# Patient Record
Sex: MALE | Race: WHITE | NOT HISPANIC OR LATINO | URBAN - METROPOLITAN AREA
[De-identification: names, ages, dates, MRNs, and addresses within clinical notes are randomized per-mention and may not be internally consistent; named-entity substitution may affect disease eponyms.]

---

## 2017-02-23 ENCOUNTER — APPOINTMENT (RX ONLY)
Dept: URBAN - METROPOLITAN AREA CLINIC 24 | Facility: CLINIC | Age: 82
Setting detail: DERMATOLOGY
End: 2017-02-23

## 2017-02-23 DIAGNOSIS — Z85.828 PERSONAL HISTORY OF OTHER MALIGNANT NEOPLASM OF SKIN: ICD-10-CM

## 2017-02-23 DIAGNOSIS — L81.4 OTHER MELANIN HYPERPIGMENTATION: ICD-10-CM

## 2017-02-23 DIAGNOSIS — L82.1 OTHER SEBORRHEIC KERATOSIS: ICD-10-CM

## 2017-02-23 PROBLEM — M12.9 ARTHROPATHY, UNSPECIFIED: Status: ACTIVE | Noted: 2017-02-23

## 2017-02-23 PROBLEM — L85.3 XEROSIS CUTIS: Status: ACTIVE | Noted: 2017-02-23

## 2017-02-23 PROBLEM — J30.1 ALLERGIC RHINITIS DUE TO POLLEN: Status: ACTIVE | Noted: 2017-02-23

## 2017-02-23 PROCEDURE — ? COUNSELING

## 2017-02-23 PROCEDURE — 99213 OFFICE O/P EST LOW 20 MIN: CPT

## 2017-02-23 ASSESSMENT — LOCATION SIMPLE DESCRIPTION DERM
LOCATION SIMPLE: NOSE
LOCATION SIMPLE: RIGHT UPPER ARM
LOCATION SIMPLE: LEFT SHOULDER
LOCATION SIMPLE: LEFT FOREARM
LOCATION SIMPLE: RIGHT FOREHEAD
LOCATION SIMPLE: UPPER BACK
LOCATION SIMPLE: CHEST
LOCATION SIMPLE: RIGHT SHOULDER

## 2017-02-23 ASSESSMENT — LOCATION ZONE DERM
LOCATION ZONE: NOSE
LOCATION ZONE: TRUNK
LOCATION ZONE: ARM
LOCATION ZONE: FACE

## 2017-02-23 ASSESSMENT — LOCATION DETAILED DESCRIPTION DERM
LOCATION DETAILED: LEFT POSTERIOR SHOULDER
LOCATION DETAILED: RIGHT MEDIAL FOREHEAD
LOCATION DETAILED: RIGHT MEDIAL INFERIOR CHEST
LOCATION DETAILED: RIGHT PROXIMAL POSTERIOR UPPER ARM
LOCATION DETAILED: INFERIOR THORACIC SPINE
LOCATION DETAILED: RIGHT POSTERIOR SHOULDER
LOCATION DETAILED: LEFT PROXIMAL DORSAL FOREARM
LOCATION DETAILED: NASAL DORSUM

## 2017-06-16 ENCOUNTER — HOSPITAL ENCOUNTER (OUTPATIENT)
Dept: CT IMAGING | Age: 82
Discharge: HOME | End: 2017-06-16
Attending: FAMILY MEDICINE
Payer: MEDICARE

## 2017-06-16 VITALS — HEIGHT: 68 IN | BODY MASS INDEX: 32.58 KG/M2 | WEIGHT: 215 LBS

## 2017-06-16 DIAGNOSIS — K59.00 CONSTIPATION, UNSPECIFIED CONSTIPATION TYPE: ICD-10-CM

## 2017-06-16 DIAGNOSIS — R14.0 ABDOMINAL BLOATING: ICD-10-CM

## 2017-06-16 PROCEDURE — 74011000258 HC RX REV CODE- 258: Performed by: FAMILY MEDICINE

## 2017-06-16 PROCEDURE — 74011636320 HC RX REV CODE- 636/320: Performed by: FAMILY MEDICINE

## 2017-06-16 PROCEDURE — 74177 CT ABD & PELVIS W/CONTRAST: CPT

## 2017-06-16 RX ORDER — SODIUM CHLORIDE 0.9 % (FLUSH) 0.9 %
10 SYRINGE (ML) INJECTION
Status: COMPLETED | OUTPATIENT
Start: 2017-06-16 | End: 2017-06-16

## 2017-06-16 RX ADMIN — Medication 10 ML: at 09:51

## 2017-06-16 RX ADMIN — SODIUM CHLORIDE 100 ML: 900 INJECTION, SOLUTION INTRAVENOUS at 09:51

## 2017-06-16 RX ADMIN — DIATRIZOATE MEGLUMINE AND DIATRIZOATE SODIUM 15 ML: 660; 100 LIQUID ORAL; RECTAL at 09:51

## 2017-06-16 RX ADMIN — IOPAMIDOL 100 ML: 755 INJECTION, SOLUTION INTRAVENOUS at 09:50

## 2017-07-26 ENCOUNTER — HOSPITAL ENCOUNTER (OUTPATIENT)
Dept: WOUND CARE | Age: 82
Discharge: HOME | End: 2017-07-26
Attending: PODIATRIST
Payer: MEDICARE

## 2017-07-26 PROCEDURE — 87075 CULTR BACTERIA EXCEPT BLOOD: CPT | Performed by: PODIATRIST

## 2017-07-26 PROCEDURE — 87186 SC STD MICRODIL/AGAR DIL: CPT | Performed by: PODIATRIST

## 2017-07-26 PROCEDURE — 87077 CULTURE AEROBIC IDENTIFY: CPT | Performed by: PODIATRIST

## 2017-07-26 PROCEDURE — 87205 SMEAR GRAM STAIN: CPT | Performed by: PODIATRIST

## 2017-07-26 PROCEDURE — 99214 OFFICE O/P EST MOD 30 MIN: CPT

## 2017-07-28 ENCOUNTER — HOSPITAL ENCOUNTER (OUTPATIENT)
Dept: GENERAL RADIOLOGY | Age: 82
Discharge: HOME | End: 2017-07-28
Payer: MEDICARE

## 2017-07-28 DIAGNOSIS — M1A.4721: ICD-10-CM

## 2017-07-28 PROCEDURE — 73630 X-RAY EXAM OF FOOT: CPT

## 2017-07-30 LAB
BACTERIA SPEC CULT: ABNORMAL
BACTERIA SPEC CULT: ABNORMAL
GRAM STN SPEC: ABNORMAL
GRAM STN SPEC: ABNORMAL
SERVICE CMNT-IMP: ABNORMAL

## 2017-07-31 LAB
BACTERIA SPEC ANAEROBE CULT: NORMAL
SPECIMEN SOURCE: NORMAL

## 2017-08-04 ENCOUNTER — HOSPITAL ENCOUNTER (OUTPATIENT)
Dept: WOUND CARE | Age: 82
Discharge: HOME | End: 2017-08-04
Attending: PODIATRIST
Payer: MEDICARE

## 2017-08-04 PROCEDURE — 99213 OFFICE O/P EST LOW 20 MIN: CPT

## 2017-08-11 PROCEDURE — 77030030708 HC OINT IODOSRB S&N -A

## 2017-08-11 PROCEDURE — 99213 OFFICE O/P EST LOW 20 MIN: CPT

## 2017-08-11 PROCEDURE — 77030011256 HC DRSG MEPILEX <16IN NO BORD MOLN -A

## 2017-08-16 LAB
BACTERIA SPEC CULT: ABNORMAL
BACTERIA SPEC CULT: ABNORMAL
SOURCE, RSRC56: ABNORMAL

## 2017-08-18 PROCEDURE — 99213 OFFICE O/P EST LOW 20 MIN: CPT

## 2017-09-18 ENCOUNTER — HOSPITAL ENCOUNTER (OUTPATIENT)
Dept: GENERAL RADIOLOGY | Age: 82
Discharge: HOME | End: 2017-09-18
Payer: MEDICARE

## 2017-09-18 DIAGNOSIS — I70.245 ATHEROSCLEROSIS OF NATIVE ARTERY OF LEFT LOWER EXTREMITY WITH ULCERATION OF OTHER PART OF FOOT (HCC): ICD-10-CM

## 2017-09-18 PROCEDURE — 73630 X-RAY EXAM OF FOOT: CPT

## 2017-09-29 PROBLEM — L97.509 FOOT ULCER (HCC): Status: ACTIVE | Noted: 2017-09-29

## 2017-10-02 ENCOUNTER — HOSPITAL ENCOUNTER (OUTPATIENT)
Dept: CT IMAGING | Age: 82
Discharge: HOME | End: 2017-10-02
Attending: NURSE PRACTITIONER
Payer: MEDICARE

## 2017-10-02 VITALS — WEIGHT: 207 LBS | BODY MASS INDEX: 31.37 KG/M2 | HEIGHT: 68 IN

## 2017-10-02 DIAGNOSIS — L97.511 FOOT ULCER, RIGHT, LIMITED TO BREAKDOWN OF SKIN (HCC): ICD-10-CM

## 2017-10-02 DIAGNOSIS — M10.00 IDIOPATHIC GOUT, UNSPECIFIED CHRONICITY, UNSPECIFIED SITE: ICD-10-CM

## 2017-10-02 DIAGNOSIS — I70.213 ATHEROSCLEROSIS OF NATIVE ARTERY OF BOTH LOWER EXTREMITIES WITH INTERMITTENT CLAUDICATION (HCC): Chronic | ICD-10-CM

## 2017-10-02 LAB — CREAT BLD-MCNC: 1.6 MG/DL (ref 0.8–1.5)

## 2017-10-02 PROCEDURE — 75635 CT ANGIO ABDOMINAL ARTERIES: CPT

## 2017-10-02 PROCEDURE — 74011636320 HC RX REV CODE- 636/320: Performed by: NURSE PRACTITIONER

## 2017-10-02 PROCEDURE — 74011250636 HC RX REV CODE- 250/636: Performed by: NURSE PRACTITIONER

## 2017-10-02 PROCEDURE — 74011000258 HC RX REV CODE- 258: Performed by: NURSE PRACTITIONER

## 2017-10-02 PROCEDURE — 82565 ASSAY OF CREATININE: CPT

## 2017-10-02 RX ORDER — SODIUM CHLORIDE 0.9 % (FLUSH) 0.9 %
10 SYRINGE (ML) INJECTION
Status: COMPLETED | OUTPATIENT
Start: 2017-10-02 | End: 2017-10-02

## 2017-10-02 RX ORDER — SODIUM CHLORIDE 9 MG/ML
91 INJECTION, SOLUTION INTRAVENOUS CONTINUOUS
Status: ACTIVE | OUTPATIENT
Start: 2017-10-02 | End: 2017-10-02

## 2017-10-02 RX ADMIN — SODIUM CHLORIDE 100 ML: 900 INJECTION, SOLUTION INTRAVENOUS at 12:51

## 2017-10-02 RX ADMIN — SODIUM CHLORIDE 273 ML: 900 INJECTION, SOLUTION INTRAVENOUS at 09:57

## 2017-10-02 RX ADMIN — IOPAMIDOL 125 ML: 755 INJECTION, SOLUTION INTRAVENOUS at 12:51

## 2017-10-02 RX ADMIN — Medication 10 ML: at 12:51

## 2017-10-26 ENCOUNTER — APPOINTMENT (RX ONLY)
Dept: URBAN - METROPOLITAN AREA CLINIC 23 | Facility: CLINIC | Age: 82
Setting detail: DERMATOLOGY
End: 2017-10-26

## 2017-10-26 DIAGNOSIS — L81.4 OTHER MELANIN HYPERPIGMENTATION: ICD-10-CM

## 2017-10-26 DIAGNOSIS — Z85.828 PERSONAL HISTORY OF OTHER MALIGNANT NEOPLASM OF SKIN: ICD-10-CM

## 2017-10-26 DIAGNOSIS — L82.1 OTHER SEBORRHEIC KERATOSIS: ICD-10-CM

## 2017-10-26 DIAGNOSIS — Z71.89 OTHER SPECIFIED COUNSELING: ICD-10-CM

## 2017-10-26 PROBLEM — M12.9 ARTHROPATHY, UNSPECIFIED: Status: ACTIVE | Noted: 2017-10-26

## 2017-10-26 PROBLEM — L85.3 XEROSIS CUTIS: Status: ACTIVE | Noted: 2017-10-26

## 2017-10-26 PROCEDURE — ? COUNSELING

## 2017-10-26 PROCEDURE — 99214 OFFICE O/P EST MOD 30 MIN: CPT

## 2017-10-26 ASSESSMENT — LOCATION SIMPLE DESCRIPTION DERM
LOCATION SIMPLE: RIGHT UPPER ARM
LOCATION SIMPLE: NOSE
LOCATION SIMPLE: RIGHT FOREHEAD
LOCATION SIMPLE: RIGHT UPPER BACK
LOCATION SIMPLE: LEFT UPPER BACK
LOCATION SIMPLE: UPPER BACK
LOCATION SIMPLE: LEFT FOREARM
LOCATION SIMPLE: RIGHT SHOULDER
LOCATION SIMPLE: CHEST
LOCATION SIMPLE: RIGHT FOREARM
LOCATION SIMPLE: LEFT SHOULDER

## 2017-10-26 ASSESSMENT — LOCATION DETAILED DESCRIPTION DERM
LOCATION DETAILED: RIGHT MEDIAL INFERIOR CHEST
LOCATION DETAILED: LEFT SUPERIOR MEDIAL UPPER BACK
LOCATION DETAILED: RIGHT MEDIAL FOREHEAD
LOCATION DETAILED: LEFT POSTERIOR SHOULDER
LOCATION DETAILED: RIGHT DISTAL DORSAL FOREARM
LOCATION DETAILED: RIGHT PROXIMAL POSTERIOR UPPER ARM
LOCATION DETAILED: RIGHT INFERIOR MEDIAL UPPER BACK
LOCATION DETAILED: LEFT PROXIMAL DORSAL FOREARM
LOCATION DETAILED: INFERIOR THORACIC SPINE
LOCATION DETAILED: NASAL DORSUM
LOCATION DETAILED: RIGHT POSTERIOR SHOULDER

## 2017-10-26 ASSESSMENT — LOCATION ZONE DERM
LOCATION ZONE: TRUNK
LOCATION ZONE: ARM
LOCATION ZONE: NOSE
LOCATION ZONE: FACE

## 2018-05-18 PROBLEM — I73.9 PAD (PERIPHERAL ARTERY DISEASE) (HCC): Status: ACTIVE | Noted: 2018-05-18

## 2018-05-18 PROBLEM — M1A.9XX1 CHRONIC TOPHACEOUS GOUT: Status: ACTIVE | Noted: 2018-05-18

## 2018-07-26 ENCOUNTER — HOSPITAL ENCOUNTER (OUTPATIENT)
Dept: LAB | Age: 83
Discharge: HOME | End: 2018-07-26

## 2018-07-26 PROCEDURE — 88305 TISSUE EXAM BY PATHOLOGIST: CPT | Performed by: INTERNAL MEDICINE

## 2018-08-21 ENCOUNTER — APPOINTMENT (RX ONLY)
Dept: URBAN - METROPOLITAN AREA CLINIC 23 | Facility: CLINIC | Age: 83
Setting detail: DERMATOLOGY
End: 2018-08-21

## 2018-08-21 DIAGNOSIS — L82.1 OTHER SEBORRHEIC KERATOSIS: ICD-10-CM

## 2018-08-21 DIAGNOSIS — D485 NEOPLASM OF UNCERTAIN BEHAVIOR OF SKIN: ICD-10-CM

## 2018-08-21 DIAGNOSIS — Z85.828 PERSONAL HISTORY OF OTHER MALIGNANT NEOPLASM OF SKIN: ICD-10-CM

## 2018-08-21 DIAGNOSIS — L81.4 OTHER MELANIN HYPERPIGMENTATION: ICD-10-CM

## 2018-08-21 DIAGNOSIS — L70.0 ACNE VULGARIS: ICD-10-CM

## 2018-08-21 DIAGNOSIS — L82.0 INFLAMED SEBORRHEIC KERATOSIS: ICD-10-CM

## 2018-08-21 DIAGNOSIS — Z71.89 OTHER SPECIFIED COUNSELING: ICD-10-CM

## 2018-08-21 PROBLEM — J30.1 ALLERGIC RHINITIS DUE TO POLLEN: Status: ACTIVE | Noted: 2018-08-21

## 2018-08-21 PROBLEM — I10 ESSENTIAL (PRIMARY) HYPERTENSION: Status: ACTIVE | Noted: 2018-08-21

## 2018-08-21 PROBLEM — D48.5 NEOPLASM OF UNCERTAIN BEHAVIOR OF SKIN: Status: ACTIVE | Noted: 2018-08-21

## 2018-08-21 PROBLEM — I25.10 ATHEROSCLEROTIC HEART DISEASE OF NATIVE CORONARY ARTERY WITHOUT ANGINA PECTORIS: Status: ACTIVE | Noted: 2018-08-21

## 2018-08-21 PROBLEM — L85.3 XEROSIS CUTIS: Status: ACTIVE | Noted: 2018-08-21

## 2018-08-21 PROBLEM — M12.9 ARTHROPATHY, UNSPECIFIED: Status: ACTIVE | Noted: 2018-08-21

## 2018-08-21 PROCEDURE — ? COUNSELING

## 2018-08-21 PROCEDURE — ? SHAVE REMOVAL

## 2018-08-21 PROCEDURE — 17110 DESTRUCTION B9 LES UP TO 14: CPT

## 2018-08-21 PROCEDURE — ? LIQUID NITROGEN

## 2018-08-21 PROCEDURE — 11100: CPT | Mod: 59

## 2018-08-21 PROCEDURE — 99213 OFFICE O/P EST LOW 20 MIN: CPT | Mod: 25

## 2018-08-21 PROCEDURE — ? BIOPSY BY SHAVE METHOD

## 2018-08-21 PROCEDURE — 11301 SHAVE SKIN LESION 0.6-1.0 CM: CPT | Mod: 59

## 2018-08-21 ASSESSMENT — LOCATION SIMPLE DESCRIPTION DERM
LOCATION SIMPLE: UPPER BACK
LOCATION SIMPLE: RIGHT UPPER ARM
LOCATION SIMPLE: LEFT SHOULDER
LOCATION SIMPLE: CHEST
LOCATION SIMPLE: LEFT FOREARM
LOCATION SIMPLE: LEFT TEMPLE
LOCATION SIMPLE: NOSE
LOCATION SIMPLE: LOWER BACK
LOCATION SIMPLE: RIGHT FOREARM
LOCATION SIMPLE: RIGHT ANTERIOR NECK
LOCATION SIMPLE: RIGHT SHOULDER
LOCATION SIMPLE: RIGHT FOREHEAD
LOCATION SIMPLE: POSTERIOR NECK
LOCATION SIMPLE: RIGHT UPPER BACK
LOCATION SIMPLE: RIGHT LOWER BACK

## 2018-08-21 ASSESSMENT — LOCATION DETAILED DESCRIPTION DERM
LOCATION DETAILED: RIGHT POSTERIOR SHOULDER
LOCATION DETAILED: RIGHT MEDIAL UPPER BACK
LOCATION DETAILED: LEFT CENTRAL TEMPLE
LOCATION DETAILED: LEFT POSTERIOR SHOULDER
LOCATION DETAILED: RIGHT CLAVICULAR NECK
LOCATION DETAILED: RIGHT PROXIMAL POSTERIOR UPPER ARM
LOCATION DETAILED: RIGHT DISTAL DORSAL FOREARM
LOCATION DETAILED: RIGHT INFERIOR MEDIAL MIDBACK
LOCATION DETAILED: RIGHT SUPERIOR LATERAL MIDBACK
LOCATION DETAILED: RIGHT MEDIAL INFERIOR CHEST
LOCATION DETAILED: LEFT PROXIMAL DORSAL FOREARM
LOCATION DETAILED: NASAL DORSUM
LOCATION DETAILED: SUPERIOR LUMBAR SPINE
LOCATION DETAILED: RIGHT MID-UPPER BACK
LOCATION DETAILED: RIGHT MEDIAL FOREHEAD
LOCATION DETAILED: SUPERIOR THORACIC SPINE
LOCATION DETAILED: LEFT MEDIAL TRAPEZIAL NECK
LOCATION DETAILED: INFERIOR THORACIC SPINE

## 2018-08-21 ASSESSMENT — LOCATION ZONE DERM
LOCATION ZONE: FACE
LOCATION ZONE: TRUNK
LOCATION ZONE: NOSE
LOCATION ZONE: ARM
LOCATION ZONE: NECK
LOCATION ZONE: FACE

## 2018-08-21 NOTE — PROCEDURE: LIQUID NITROGEN
Post-Care Instructions: I reviewed with the patient in detail post-care instructions. Patient is to wear sunprotection, and avoid picking at any of the treated lesions. Pt may apply Vaseline to crusted or scabbing areas.
Pared With?: 15 blade
Consent: The patient's consent was obtained including but not limited to risks of crusting, scabbing, blistering, scarring, darker or lighter pigmentary change, recurrence, incomplete removal and infection.
Include Z78.9 (Other Specified Conditions Influencing Health Status) As An Associated Diagnosis?: No
Duration Of Freeze Thaw-Cycle (Seconds): 5-10
Medical Necessity Information: It is in your best interest to select a reason for this procedure from the list below. All of these items fulfill various CMS LCD requirements except the new and changing color options.
Detail Level: Detailed
Number Of Freeze-Thaw Cycles: 1 freeze-thaw cycle

## 2018-08-21 NOTE — PROCEDURE: BIOPSY BY SHAVE METHOD
Bill 51628 For Specimen Handling/Conveyance To Laboratory?: no
Dressing: bandage
Billing Type: Third-Party Bill
Biopsy Type: H and E
Type Of Destruction Used: Curettage
Electrodesiccation And Curettage Text: The wound bed was treated with electrodesiccation and curettage after the biopsy was performed.
Depth Of Biopsy: dermis
Anesthesia Volume In Cc: 0.5
Anesthesia Type: 1% lidocaine with epinephrine
Silver Nitrate Text: The wound bed was treated with silver nitrate after the biopsy was performed.
Biopsy Method: Dermablade
Detail Level: Detailed
Size Of Lesion In Cm: 0.7
Cryotherapy Text: The wound bed was treated with cryotherapy after the biopsy was performed.
Consent: Written consent was obtained and risks were reviewed including but not limited to scarring, infection, bleeding, scabbing, incomplete removal, nerve damage and allergy to anesthesia.
Post-Care Instructions: I reviewed with the patient in detail post-care instructions. Patient is to keep the biopsy site dry overnight, and then apply bacitracin twice daily until healed. Patient may apply hydrogen peroxide soaks to remove any crusting.
Was A Bandage Applied: Yes
Wound Care: Petrolatum
Path Notes (To The Dermatopathologist): Please check margins.
Electrodesiccation Text: The wound bed was treated with electrodesiccation after the biopsy was performed.
Hemostasis: Electrocautery
Notification Instructions: Patient will be notified of biopsy results. However, patient instructed to call the office if not contacted within 2 weeks.
X Size Of Lesion In Cm: 0

## 2018-08-21 NOTE — PROCEDURE: SHAVE REMOVAL
Billing Type: Third-Party Bill
Render Post-Care Instructions In Note?: no
Consent was obtained from the patient. The risks and benefits to therapy were discussed in detail. Specifically, the risks of infection, scarring, bleeding, prolonged wound healing, incomplete removal, allergy to anesthesia, nerve injury and recurrence were addressed. Prior to the procedure, the treatment site was clearly identified and confirmed by the patient.
Path Notes (To The Dermatopathologist): Please check margins.
Detail Level: Detailed
Was A Bandage Applied: Yes
Wound Care: Petrolatum
Anesthesia Volume In Cc: 1.5
Hemostasis: Electrocautery
Biopsy Method: Dermablade
Medical Necessity Information: It is in your best interest to select a reason for this procedure from the list below. All of these items fulfill various CMS LCD requirements except the new and changing color options.
Medical Necessity Clause: This procedure was medically necessary because the lesion that was treated was:
X Size Of Lesion In Cm (Optional): 0
Size Of Lesion In Cm (Required): 0.9
Post-Care Instructions: I reviewed with the patient in detail post-care instructions. Patient is to keep the biopsy site dry overnight, and then apply bacitracin twice daily until healed. Patient may apply hydrogen peroxide soaks to remove any crusting.
Anesthesia Type: 1% lidocaine with epinephrine
Notification Instructions: Patient will be notified of biopsy results. However, patient instructed to call the office if not contacted within 2 weeks.

## 2018-11-20 ENCOUNTER — APPOINTMENT (RX ONLY)
Dept: URBAN - METROPOLITAN AREA CLINIC 23 | Facility: CLINIC | Age: 83
Setting detail: DERMATOLOGY
End: 2018-11-20

## 2018-11-20 DIAGNOSIS — D18.0 HEMANGIOMA: ICD-10-CM

## 2018-11-20 DIAGNOSIS — Z71.89 OTHER SPECIFIED COUNSELING: ICD-10-CM

## 2018-11-20 DIAGNOSIS — Z85.828 PERSONAL HISTORY OF OTHER MALIGNANT NEOPLASM OF SKIN: ICD-10-CM

## 2018-11-20 DIAGNOSIS — L81.4 OTHER MELANIN HYPERPIGMENTATION: ICD-10-CM

## 2018-11-20 DIAGNOSIS — L57.0 ACTINIC KERATOSIS: ICD-10-CM

## 2018-11-20 PROBLEM — E78.5 HYPERLIPIDEMIA, UNSPECIFIED: Status: ACTIVE | Noted: 2018-11-20

## 2018-11-20 PROBLEM — I25.10 ATHEROSCLEROTIC HEART DISEASE OF NATIVE CORONARY ARTERY WITHOUT ANGINA PECTORIS: Status: ACTIVE | Noted: 2018-11-20

## 2018-11-20 PROBLEM — L85.3 XEROSIS CUTIS: Status: ACTIVE | Noted: 2018-11-20

## 2018-11-20 PROBLEM — D18.01 HEMANGIOMA OF SKIN AND SUBCUTANEOUS TISSUE: Status: ACTIVE | Noted: 2018-11-20

## 2018-11-20 PROCEDURE — 99213 OFFICE O/P EST LOW 20 MIN: CPT | Mod: 25

## 2018-11-20 PROCEDURE — 17003 DESTRUCT PREMALG LES 2-14: CPT

## 2018-11-20 PROCEDURE — ? LIQUID NITROGEN

## 2018-11-20 PROCEDURE — 17000 DESTRUCT PREMALG LESION: CPT

## 2018-11-20 PROCEDURE — ? COUNSELING

## 2018-11-20 ASSESSMENT — LOCATION SIMPLE DESCRIPTION DERM
LOCATION SIMPLE: RIGHT FOREHEAD
LOCATION SIMPLE: LEFT FOREARM
LOCATION SIMPLE: RIGHT UPPER ARM
LOCATION SIMPLE: RIGHT SHOULDER
LOCATION SIMPLE: ABDOMEN
LOCATION SIMPLE: LEFT TEMPLE
LOCATION SIMPLE: RIGHT CHEEK
LOCATION SIMPLE: CHEST
LOCATION SIMPLE: LEFT FOREHEAD
LOCATION SIMPLE: LEFT SHOULDER
LOCATION SIMPLE: NOSE
LOCATION SIMPLE: RIGHT UPPER BACK

## 2018-11-20 ASSESSMENT — LOCATION DETAILED DESCRIPTION DERM
LOCATION DETAILED: RIGHT INFERIOR MEDIAL MALAR CHEEK
LOCATION DETAILED: RIGHT CENTRAL MALAR CHEEK
LOCATION DETAILED: LEFT INFERIOR MEDIAL FOREHEAD
LOCATION DETAILED: LEFT CENTRAL TEMPLE
LOCATION DETAILED: RIGHT INFERIOR UPPER BACK
LOCATION DETAILED: NASAL DORSUM
LOCATION DETAILED: LEFT PROXIMAL DORSAL FOREARM
LOCATION DETAILED: RIGHT POSTERIOR SHOULDER
LOCATION DETAILED: LEFT LATERAL ABDOMEN
LOCATION DETAILED: LEFT POSTERIOR SHOULDER
LOCATION DETAILED: RIGHT MEDIAL INFERIOR CHEST
LOCATION DETAILED: RIGHT PROXIMAL POSTERIOR UPPER ARM
LOCATION DETAILED: RIGHT MEDIAL FOREHEAD

## 2018-11-20 ASSESSMENT — LOCATION ZONE DERM
LOCATION ZONE: FACE
LOCATION ZONE: TRUNK
LOCATION ZONE: ARM
LOCATION ZONE: FACE
LOCATION ZONE: NOSE

## 2018-11-20 NOTE — PROCEDURE: LIQUID NITROGEN
Number Of Freeze-Thaw Cycles: 1 freeze-thaw cycle
Duration Of Freeze Thaw-Cycle (Seconds): 5
Post-Care Instructions: I reviewed with the patient in detail post-care instructions. Patient is to wear sunprotection, and avoid picking at any of the treated lesions. Pt may apply Vaseline to crusted or scabbing areas.
Detail Level: Detailed
Consent: The patient's consent was obtained including but not limited to risks of crusting, scabbing, blistering, scarring, darker or lighter pigmentary change, recurrence, incomplete removal and infection.
Render Post-Care Instructions In Note?: no

## 2019-01-22 ENCOUNTER — APPOINTMENT (RX ONLY)
Dept: URBAN - METROPOLITAN AREA CLINIC 23 | Facility: CLINIC | Age: 84
Setting detail: DERMATOLOGY
End: 2019-01-22

## 2019-01-22 DIAGNOSIS — D485 NEOPLASM OF UNCERTAIN BEHAVIOR OF SKIN: ICD-10-CM

## 2019-01-22 PROBLEM — D48.5 NEOPLASM OF UNCERTAIN BEHAVIOR OF SKIN: Status: ACTIVE | Noted: 2019-01-22

## 2019-01-22 PROBLEM — I10 ESSENTIAL (PRIMARY) HYPERTENSION: Status: ACTIVE | Noted: 2019-01-22

## 2019-01-22 PROBLEM — J30.1 ALLERGIC RHINITIS DUE TO POLLEN: Status: ACTIVE | Noted: 2019-01-22

## 2019-01-22 PROBLEM — I25.10 ATHEROSCLEROTIC HEART DISEASE OF NATIVE CORONARY ARTERY WITHOUT ANGINA PECTORIS: Status: ACTIVE | Noted: 2019-01-22

## 2019-01-22 PROCEDURE — 11102 TANGNTL BX SKIN SINGLE LES: CPT

## 2019-01-22 PROCEDURE — ? BIOPSY BY SHAVE METHOD

## 2019-01-22 ASSESSMENT — LOCATION SIMPLE DESCRIPTION DERM: LOCATION SIMPLE: LEFT TEMPLE

## 2019-01-22 ASSESSMENT — LOCATION DETAILED DESCRIPTION DERM: LOCATION DETAILED: LEFT CENTRAL TEMPLE

## 2019-01-22 ASSESSMENT — LOCATION ZONE DERM: LOCATION ZONE: FACE

## 2019-01-22 NOTE — PROCEDURE: BIOPSY BY SHAVE METHOD
Detail Level: Detailed
Consent: Written consent was obtained and risks were reviewed including but not limited to scarring, infection, bleeding, scabbing, incomplete removal, nerve damage and allergy to anesthesia.
Path Notes (To The Dermatopathologist): Please check margins.
Biopsy Type: H and E
Post-Care Instructions: I reviewed with the patient in detail post-care instructions. Patient is to keep the biopsy site dry overnight, and then apply bacitracin twice daily until healed. Patient may apply hydrogen peroxide soaks to remove any crusting.
Dressing: bandage
Billing Type: Third-Party Bill
Anesthesia Type: 1% lidocaine with epinephrine
Anesthesia Volume In Cc: 0.3
Electrodesiccation And Curettage Text: The wound bed was treated with electrodesiccation and curettage after the biopsy was performed.
Additional Anesthesia Volume In Cc (Will Not Render If 0): 0
Bill For Surgical Tray: no
Type Of Destruction Used: Curettage
Biopsy Method: Dermablade
Cryotherapy Text: The wound bed was treated with cryotherapy after the biopsy was performed.
Was A Bandage Applied: Yes
Hemostasis: Electrocautery
Silver Nitrate Text: The wound bed was treated with silver nitrate after the biopsy was performed.
Depth Of Biopsy: dermis
Size Of Lesion In Cm: 0.5
Notification Instructions: Patient will be notified of biopsy results. However, patient instructed to call the office if not contacted within 2 weeks.
Electrodesiccation Text: The wound bed was treated with electrodesiccation after the biopsy was performed.
Wound Care: Petrolatum

## 2019-03-29 PROBLEM — L97.509 FOOT ULCER (HCC): Status: RESOLVED | Noted: 2017-09-29 | Resolved: 2019-03-29

## 2019-04-18 ENCOUNTER — APPOINTMENT (RX ONLY)
Dept: URBAN - METROPOLITAN AREA CLINIC 23 | Facility: CLINIC | Age: 84
Setting detail: DERMATOLOGY
End: 2019-04-18

## 2019-04-18 DIAGNOSIS — L81.4 OTHER MELANIN HYPERPIGMENTATION: ICD-10-CM

## 2019-04-18 DIAGNOSIS — L85.3 XEROSIS CUTIS: ICD-10-CM

## 2019-04-18 DIAGNOSIS — Z85.828 PERSONAL HISTORY OF OTHER MALIGNANT NEOPLASM OF SKIN: ICD-10-CM

## 2019-04-18 DIAGNOSIS — D18.0 HEMANGIOMA: ICD-10-CM

## 2019-04-18 DIAGNOSIS — Z71.89 OTHER SPECIFIED COUNSELING: ICD-10-CM

## 2019-04-18 PROBLEM — I25.10 ATHEROSCLEROTIC HEART DISEASE OF NATIVE CORONARY ARTERY WITHOUT ANGINA PECTORIS: Status: ACTIVE | Noted: 2019-04-18

## 2019-04-18 PROBLEM — D18.01 HEMANGIOMA OF SKIN AND SUBCUTANEOUS TISSUE: Status: ACTIVE | Noted: 2019-04-18

## 2019-04-18 PROCEDURE — 99213 OFFICE O/P EST LOW 20 MIN: CPT

## 2019-04-18 PROCEDURE — ? COUNSELING

## 2019-04-18 ASSESSMENT — LOCATION DETAILED DESCRIPTION DERM
LOCATION DETAILED: LEFT CENTRAL TEMPLE
LOCATION DETAILED: INFERIOR THORACIC SPINE
LOCATION DETAILED: RIGHT POSTERIOR SHOULDER
LOCATION DETAILED: LEFT PROXIMAL DORSAL FOREARM
LOCATION DETAILED: LEFT LATERAL ABDOMEN
LOCATION DETAILED: LEFT INFERIOR MEDIAL UPPER BACK
LOCATION DETAILED: RIGHT MEDIAL FOREHEAD
LOCATION DETAILED: PERIUMBILICAL SKIN
LOCATION DETAILED: LEFT POSTERIOR SHOULDER
LOCATION DETAILED: RIGHT PROXIMAL PRETIBIAL REGION
LOCATION DETAILED: RIGHT MEDIAL INFERIOR CHEST
LOCATION DETAILED: RIGHT PROXIMAL POSTERIOR UPPER ARM
LOCATION DETAILED: NASAL DORSUM
LOCATION DETAILED: LEFT PROXIMAL PRETIBIAL REGION

## 2019-04-18 ASSESSMENT — LOCATION SIMPLE DESCRIPTION DERM
LOCATION SIMPLE: LEFT FOREARM
LOCATION SIMPLE: RIGHT FOREHEAD
LOCATION SIMPLE: ABDOMEN
LOCATION SIMPLE: LEFT UPPER BACK
LOCATION SIMPLE: RIGHT SHOULDER
LOCATION SIMPLE: LEFT PRETIBIAL REGION
LOCATION SIMPLE: LEFT TEMPLE
LOCATION SIMPLE: NOSE
LOCATION SIMPLE: CHEST
LOCATION SIMPLE: LEFT SHOULDER
LOCATION SIMPLE: RIGHT UPPER ARM
LOCATION SIMPLE: UPPER BACK
LOCATION SIMPLE: RIGHT PRETIBIAL REGION

## 2019-04-18 ASSESSMENT — LOCATION ZONE DERM
LOCATION ZONE: NOSE
LOCATION ZONE: LEG
LOCATION ZONE: FACE
LOCATION ZONE: FACE
LOCATION ZONE: ARM
LOCATION ZONE: TRUNK

## 2019-08-10 PROBLEM — K64.0 GRADE I HEMORRHOIDS: Status: ACTIVE | Noted: 2019-08-10

## 2019-08-15 ENCOUNTER — APPOINTMENT (RX ONLY)
Dept: URBAN - METROPOLITAN AREA CLINIC 23 | Facility: CLINIC | Age: 84
Setting detail: DERMATOLOGY
End: 2019-08-15

## 2019-08-15 DIAGNOSIS — Z71.89 OTHER SPECIFIED COUNSELING: ICD-10-CM

## 2019-08-15 DIAGNOSIS — D18.0 HEMANGIOMA: ICD-10-CM

## 2019-08-15 DIAGNOSIS — L85.3 XEROSIS CUTIS: ICD-10-CM

## 2019-08-15 DIAGNOSIS — Z85.828 PERSONAL HISTORY OF OTHER MALIGNANT NEOPLASM OF SKIN: ICD-10-CM

## 2019-08-15 DIAGNOSIS — L81.4 OTHER MELANIN HYPERPIGMENTATION: ICD-10-CM

## 2019-08-15 PROBLEM — D18.01 HEMANGIOMA OF SKIN AND SUBCUTANEOUS TISSUE: Status: ACTIVE | Noted: 2019-08-15

## 2019-08-15 PROBLEM — J30.1 ALLERGIC RHINITIS DUE TO POLLEN: Status: ACTIVE | Noted: 2019-08-15

## 2019-08-15 PROBLEM — M12.9 ARTHROPATHY, UNSPECIFIED: Status: ACTIVE | Noted: 2019-08-15

## 2019-08-15 PROBLEM — I10 ESSENTIAL (PRIMARY) HYPERTENSION: Status: ACTIVE | Noted: 2019-08-15

## 2019-08-15 PROBLEM — E78.5 HYPERLIPIDEMIA, UNSPECIFIED: Status: ACTIVE | Noted: 2019-08-15

## 2019-08-15 PROCEDURE — ? COUNSELING

## 2019-08-15 PROCEDURE — 99213 OFFICE O/P EST LOW 20 MIN: CPT

## 2019-08-15 ASSESSMENT — LOCATION SIMPLE DESCRIPTION DERM
LOCATION SIMPLE: LEFT UPPER BACK
LOCATION SIMPLE: UPPER BACK
LOCATION SIMPLE: LEFT TEMPLE
LOCATION SIMPLE: RIGHT UPPER ARM
LOCATION SIMPLE: NOSE
LOCATION SIMPLE: LEFT PRETIBIAL REGION
LOCATION SIMPLE: LEFT SHOULDER
LOCATION SIMPLE: LEFT FOREARM
LOCATION SIMPLE: CHEST
LOCATION SIMPLE: ABDOMEN
LOCATION SIMPLE: RIGHT FOREHEAD
LOCATION SIMPLE: RIGHT PRETIBIAL REGION
LOCATION SIMPLE: RIGHT SHOULDER

## 2019-08-15 ASSESSMENT — LOCATION DETAILED DESCRIPTION DERM
LOCATION DETAILED: LEFT POSTERIOR SHOULDER
LOCATION DETAILED: RIGHT MEDIAL INFERIOR CHEST
LOCATION DETAILED: LEFT PROXIMAL DORSAL FOREARM
LOCATION DETAILED: LEFT INFERIOR MEDIAL UPPER BACK
LOCATION DETAILED: RIGHT PROXIMAL PRETIBIAL REGION
LOCATION DETAILED: LEFT LATERAL ABDOMEN
LOCATION DETAILED: RIGHT POSTERIOR SHOULDER
LOCATION DETAILED: INFERIOR THORACIC SPINE
LOCATION DETAILED: RIGHT PROXIMAL POSTERIOR UPPER ARM
LOCATION DETAILED: RIGHT MEDIAL FOREHEAD
LOCATION DETAILED: NASAL DORSUM
LOCATION DETAILED: LEFT CENTRAL TEMPLE
LOCATION DETAILED: PERIUMBILICAL SKIN
LOCATION DETAILED: LEFT PROXIMAL PRETIBIAL REGION

## 2019-08-15 ASSESSMENT — LOCATION ZONE DERM
LOCATION ZONE: FACE
LOCATION ZONE: NOSE
LOCATION ZONE: TRUNK
LOCATION ZONE: FACE
LOCATION ZONE: LEG
LOCATION ZONE: ARM

## 2019-09-23 ENCOUNTER — HOSPITAL ENCOUNTER (OUTPATIENT)
Dept: CT IMAGING | Age: 84
Discharge: HOME | End: 2019-09-23
Attending: FAMILY MEDICINE
Payer: MEDICARE

## 2019-09-23 DIAGNOSIS — R14.0 ABDOMINAL BLOATING: ICD-10-CM

## 2019-09-23 DIAGNOSIS — R63.4 WEIGHT LOSS: ICD-10-CM

## 2019-09-23 LAB — CREAT BLD-MCNC: 1.3 MG/DL (ref 0.8–1.5)

## 2019-09-23 PROCEDURE — 74011000258 HC RX REV CODE- 258: Performed by: FAMILY MEDICINE

## 2019-09-23 PROCEDURE — 82565 ASSAY OF CREATININE: CPT

## 2019-09-23 PROCEDURE — 74011636320 HC RX REV CODE- 636/320: Performed by: FAMILY MEDICINE

## 2019-09-23 PROCEDURE — 74177 CT ABD & PELVIS W/CONTRAST: CPT

## 2019-09-23 RX ORDER — SODIUM CHLORIDE 0.9 % (FLUSH) 0.9 %
10 SYRINGE (ML) INJECTION
Status: COMPLETED | OUTPATIENT
Start: 2019-09-23 | End: 2019-09-23

## 2019-09-23 RX ADMIN — IOPAMIDOL 100 ML: 755 INJECTION, SOLUTION INTRAVENOUS at 10:10

## 2019-09-23 RX ADMIN — SODIUM CHLORIDE 100 ML: 900 INJECTION, SOLUTION INTRAVENOUS at 10:10

## 2019-09-23 RX ADMIN — DIATRIZOATE MEGLUMINE AND DIATRIZOATE SODIUM 15 ML: 660; 100 LIQUID ORAL; RECTAL at 10:10

## 2019-09-23 RX ADMIN — Medication 10 ML: at 10:10

## 2019-12-02 PROBLEM — I73.9 PAD (PERIPHERAL ARTERY DISEASE) (HCC): Status: RESOLVED | Noted: 2018-05-18 | Resolved: 2019-12-02

## 2020-01-16 ENCOUNTER — APPOINTMENT (RX ONLY)
Dept: URBAN - METROPOLITAN AREA CLINIC 23 | Facility: CLINIC | Age: 85
Setting detail: DERMATOLOGY
End: 2020-01-16

## 2020-01-16 DIAGNOSIS — L81.4 OTHER MELANIN HYPERPIGMENTATION: ICD-10-CM

## 2020-01-16 DIAGNOSIS — Z85.828 PERSONAL HISTORY OF OTHER MALIGNANT NEOPLASM OF SKIN: ICD-10-CM

## 2020-01-16 DIAGNOSIS — D18.0 HEMANGIOMA: ICD-10-CM

## 2020-01-16 DIAGNOSIS — L57.0 ACTINIC KERATOSIS: ICD-10-CM

## 2020-01-16 DIAGNOSIS — Z71.89 OTHER SPECIFIED COUNSELING: ICD-10-CM

## 2020-01-16 PROBLEM — I25.10 ATHEROSCLEROTIC HEART DISEASE OF NATIVE CORONARY ARTERY WITHOUT ANGINA PECTORIS: Status: ACTIVE | Noted: 2020-01-16

## 2020-01-16 PROBLEM — E78.5 HYPERLIPIDEMIA, UNSPECIFIED: Status: ACTIVE | Noted: 2020-01-16

## 2020-01-16 PROBLEM — D18.01 HEMANGIOMA OF SKIN AND SUBCUTANEOUS TISSUE: Status: ACTIVE | Noted: 2020-01-16

## 2020-01-16 PROCEDURE — 17000 DESTRUCT PREMALG LESION: CPT

## 2020-01-16 PROCEDURE — 17003 DESTRUCT PREMALG LES 2-14: CPT

## 2020-01-16 PROCEDURE — 99213 OFFICE O/P EST LOW 20 MIN: CPT | Mod: 25

## 2020-01-16 PROCEDURE — ? COUNSELING

## 2020-01-16 PROCEDURE — ? LIQUID NITROGEN

## 2020-01-16 ASSESSMENT — LOCATION SIMPLE DESCRIPTION DERM
LOCATION SIMPLE: UPPER BACK
LOCATION SIMPLE: CHEST
LOCATION SIMPLE: NOSE
LOCATION SIMPLE: ABDOMEN
LOCATION SIMPLE: RIGHT UPPER ARM
LOCATION SIMPLE: RIGHT FOREHEAD
LOCATION SIMPLE: RIGHT EYEBROW
LOCATION SIMPLE: RIGHT FOREHEAD
LOCATION SIMPLE: LEFT TEMPLE
LOCATION SIMPLE: LEFT FOREARM
LOCATION SIMPLE: RIGHT CHEEK
LOCATION SIMPLE: LEFT FOREHEAD

## 2020-01-16 ASSESSMENT — LOCATION DETAILED DESCRIPTION DERM
LOCATION DETAILED: RIGHT MEDIAL INFERIOR CHEST
LOCATION DETAILED: RIGHT MEDIAL FOREHEAD
LOCATION DETAILED: RIGHT PROXIMAL POSTERIOR UPPER ARM
LOCATION DETAILED: LEFT SUPERIOR FOREHEAD
LOCATION DETAILED: LEFT PROXIMAL DORSAL FOREARM
LOCATION DETAILED: RIGHT LATERAL EYEBROW
LOCATION DETAILED: LEFT CENTRAL TEMPLE
LOCATION DETAILED: SUPERIOR THORACIC SPINE
LOCATION DETAILED: INFERIOR THORACIC SPINE
LOCATION DETAILED: PERIUMBILICAL SKIN
LOCATION DETAILED: RIGHT SUPERIOR LATERAL MALAR CHEEK
LOCATION DETAILED: NASAL DORSUM
LOCATION DETAILED: RIGHT INFERIOR FOREHEAD

## 2020-01-16 ASSESSMENT — LOCATION ZONE DERM
LOCATION ZONE: FACE
LOCATION ZONE: ARM
LOCATION ZONE: FACE
LOCATION ZONE: NOSE
LOCATION ZONE: TRUNK

## 2020-02-02 PROBLEM — K59.01 CONSTIPATION BY DELAYED COLONIC TRANSIT: Status: ACTIVE | Noted: 2020-02-02

## 2020-05-19 ENCOUNTER — APPOINTMENT (OUTPATIENT)
Dept: GENERAL RADIOLOGY | Age: 85
End: 2020-05-19
Attending: EMERGENCY MEDICINE
Payer: MEDICARE

## 2020-05-19 ENCOUNTER — HOSPITAL ENCOUNTER (EMERGENCY)
Age: 85
Discharge: HOME | End: 2020-05-19
Attending: EMERGENCY MEDICINE
Payer: MEDICARE

## 2020-05-19 VITALS
DIASTOLIC BLOOD PRESSURE: 68 MMHG | HEART RATE: 99 BPM | BODY MASS INDEX: 28.34 KG/M2 | SYSTOLIC BLOOD PRESSURE: 138 MMHG | WEIGHT: 187 LBS | OXYGEN SATURATION: 100 % | HEIGHT: 68 IN | TEMPERATURE: 98.3 F | RESPIRATION RATE: 18 BRPM

## 2020-05-19 DIAGNOSIS — R06.2 WHEEZING: ICD-10-CM

## 2020-05-19 DIAGNOSIS — R06.02 SHORTNESS OF BREATH: Primary | ICD-10-CM

## 2020-05-19 LAB
ALBUMIN SERPL-MCNC: 3.6 G/DL (ref 3.2–4.6)
ALBUMIN/GLOB SERPL: 0.6 {RATIO} (ref 1.2–3.5)
ALP SERPL-CCNC: 227 U/L (ref 50–136)
ALT SERPL-CCNC: 18 U/L (ref 12–65)
ANION GAP SERPL CALC-SCNC: 6 MMOL/L (ref 7–16)
AST SERPL-CCNC: 18 U/L (ref 15–37)
ATRIAL RATE: 119 BPM
BASOPHILS # BLD: 0.1 K/UL (ref 0–0.2)
BASOPHILS NFR BLD: 1 % (ref 0–2)
BILIRUB SERPL-MCNC: 0.4 MG/DL (ref 0.2–1.1)
BUN SERPL-MCNC: 14 MG/DL (ref 8–23)
CALCIUM SERPL-MCNC: 9.6 MG/DL (ref 8.3–10.4)
CALCULATED P AXIS, ECG09: 67 DEGREES
CALCULATED R AXIS, ECG10: 105 DEGREES
CALCULATED T AXIS, ECG11: 14 DEGREES
CHLORIDE SERPL-SCNC: 107 MMOL/L (ref 98–107)
CO2 SERPL-SCNC: 24 MMOL/L (ref 21–32)
CREAT SERPL-MCNC: 1.25 MG/DL (ref 0.8–1.5)
DIAGNOSIS, 93000: NORMAL
DIFFERENTIAL METHOD BLD: ABNORMAL
EOSINOPHIL # BLD: 3.6 K/UL (ref 0–0.8)
EOSINOPHIL NFR BLD: 33 % (ref 0.5–7.8)
ERYTHROCYTE [DISTWIDTH] IN BLOOD BY AUTOMATED COUNT: 13.6 % (ref 11.9–14.6)
GLOBULIN SER CALC-MCNC: 6 G/DL (ref 2.3–3.5)
GLUCOSE SERPL-MCNC: 79 MG/DL (ref 65–100)
HCT VFR BLD AUTO: 45.1 % (ref 41.1–50.3)
HGB BLD-MCNC: 14.8 G/DL (ref 13.6–17.2)
IMM GRANULOCYTES # BLD AUTO: 0 K/UL (ref 0–0.5)
IMM GRANULOCYTES NFR BLD AUTO: 0 % (ref 0–5)
LACTATE SERPL-SCNC: 1.3 MMOL/L (ref 0.4–2)
LYMPHOCYTES # BLD: 2.2 K/UL (ref 0.5–4.6)
LYMPHOCYTES NFR BLD: 21 % (ref 13–44)
MCH RBC QN AUTO: 33 PG (ref 26.1–32.9)
MCHC RBC AUTO-ENTMCNC: 32.8 G/DL (ref 31.4–35)
MCV RBC AUTO: 100.7 FL (ref 79.6–97.8)
MONOCYTES # BLD: 1.1 K/UL (ref 0.1–1.3)
MONOCYTES NFR BLD: 10 % (ref 4–12)
NEUTS SEG # BLD: 3.8 K/UL (ref 1.7–8.2)
NEUTS SEG NFR BLD: 35 % (ref 43–78)
NRBC # BLD: 0 K/UL (ref 0–0.2)
P-R INTERVAL, ECG05: 168 MS
PLATELET # BLD AUTO: 285 K/UL (ref 150–450)
PMV BLD AUTO: 8 FL (ref 9.4–12.3)
POTASSIUM SERPL-SCNC: 3.9 MMOL/L (ref 3.5–5.1)
PROT SERPL-MCNC: 9.6 G/DL (ref 6.3–8.2)
Q-T INTERVAL, ECG07: 312 MS
QRS DURATION, ECG06: 78 MS
QTC CALCULATION (BEZET), ECG08: 438 MS
RBC # BLD AUTO: 4.48 M/UL (ref 4.23–5.6)
SODIUM SERPL-SCNC: 137 MMOL/L (ref 136–145)
VENTRICULAR RATE, ECG03: 119 BPM
WBC # BLD AUTO: 10.8 K/UL (ref 4.3–11.1)

## 2020-05-19 PROCEDURE — 74011000250 HC RX REV CODE- 250: Performed by: EMERGENCY MEDICINE

## 2020-05-19 PROCEDURE — 74011250636 HC RX REV CODE- 250/636: Performed by: EMERGENCY MEDICINE

## 2020-05-19 PROCEDURE — 83605 ASSAY OF LACTIC ACID: CPT

## 2020-05-19 PROCEDURE — 80053 COMPREHEN METABOLIC PANEL: CPT

## 2020-05-19 PROCEDURE — 94640 AIRWAY INHALATION TREATMENT: CPT

## 2020-05-19 PROCEDURE — 93005 ELECTROCARDIOGRAM TRACING: CPT | Performed by: EMERGENCY MEDICINE

## 2020-05-19 PROCEDURE — 71045 X-RAY EXAM CHEST 1 VIEW: CPT

## 2020-05-19 PROCEDURE — 99284 EMERGENCY DEPT VISIT MOD MDM: CPT

## 2020-05-19 PROCEDURE — 85025 COMPLETE CBC W/AUTO DIFF WBC: CPT

## 2020-05-19 RX ORDER — SODIUM CHLORIDE 9 MG/ML
1000 INJECTION, SOLUTION INTRAVENOUS ONCE
Status: COMPLETED | OUTPATIENT
Start: 2020-05-19 | End: 2020-05-19

## 2020-05-19 RX ORDER — ALBUTEROL SULFATE 90 UG/1
2 AEROSOL, METERED RESPIRATORY (INHALATION)
Qty: 1 INHALER | Refills: 0 | Status: SHIPPED | OUTPATIENT
Start: 2020-05-19

## 2020-05-19 RX ORDER — IPRATROPIUM BROMIDE AND ALBUTEROL SULFATE 2.5; .5 MG/3ML; MG/3ML
3 SOLUTION RESPIRATORY (INHALATION)
Status: COMPLETED | OUTPATIENT
Start: 2020-05-19 | End: 2020-05-19

## 2020-05-19 RX ADMIN — SODIUM CHLORIDE 1000 ML: 900 INJECTION, SOLUTION INTRAVENOUS at 11:13

## 2020-05-19 RX ADMIN — IPRATROPIUM BROMIDE AND ALBUTEROL SULFATE 3 ML: .5; 3 SOLUTION RESPIRATORY (INHALATION) at 10:51

## 2020-05-19 NOTE — ED NOTES
I have reviewed discharge instructions with the patient. The patient verbalized understanding. Patient left ED via Discharge Method: wheelchair to Home with grandson. Opportunity for questions and clarification provided. Patient given 1 scripts. No e-sign. Patient wearing face mask appropriately upon discharge. To continue your aftercare when you leave the hospital, you may receive an automated call from our care team to check in on how you are doing. This is a free service and part of our promise to provide the best care and service to meet your aftercare needs.  If you have questions, or wish to unsubscribe from this service please call [phone]. Thank you for Choosing our [facility] Emergency Department.

## 2020-05-19 NOTE — ACP (ADVANCE CARE PLANNING)
Met with patient in the ER to discuss ACP, wearing appropriate PPE / no physical contact with patient. Per Dr. Saskia Henriquez, patient has capacity to answer questions and make decisions. Patient understands that ACP discussion today does not take the place of the [facility] paperwork. Patient does not wish to follow up with  services at this time but will review paperwork with his family on discharge. He understands he can contact  services with any questions about [facility].

## 2020-05-19 NOTE — DISCHARGE INSTRUCTIONS
Use inhalers as directed    2 puffs every 4-6 hours for the next several days    Check for increasing shortness of breath. March in place or move your arms vigorously for several minutes. If you have increasing difficulty doing this or worsening shortness of breath with it please come back to the emergency room    Call your doctor for a follow-up visit    We will call you with your coronavirus results.

## 2020-05-19 NOTE — ED PROVIDER NOTES
[facility] Emergency Department  Arrival Date/Time: 5/19/2020 @  9:44 AM      Destini Still  MRN: [id number]      89 y.o. male    YOB: 1935   [phone] (home)     [facility] EMERGENCY DEPT ERA/ A  Seen on 5/19/2020 @ 10:51 AM      Today's Chief Complaint:   Chief Complaint   Patient presents with    Cough     HPI: 28-year-old male presents to the emergency department with shortness of breath. Worse over the last couple days. Worse with activity. Better with rest    He has a history of smoking. Quit 30 years ago. History of peripheral vascular disease with intermittent pain in his legs    No fever. Oxygen saturations are in the low 90s. He was noted to be tachycardic initially but that is resolved       HPI    Review of Systems: Review of Systems   Constitutional: Negative for activity change, appetite change and chills. HENT: Negative. Respiratory: Positive for cough, shortness of breath and wheezing. Gastrointestinal: Negative. Musculoskeletal: Negative. Skin: Negative. Neurological: Negative. Psychiatric/Behavioral: Negative. Past Medical History: Primary Care Doctor: Rome Ragsdale MD  Meds, PMH, PSHx, SocHx at end of this note     Allergies: Allergies   Allergen Reactions    Aspirin Swelling    Prednisone Myalgia         Key Anti-Platelet Anticoagulant Meds             clopidogreL (Plavix) 75 mg tab Take 75 mg by mouth.    cilostazoL (PLETAL) 100 mg tablet Take  by mouth Before breakfast and dinner. Physical Exam:  Nursing documentation reviewed. Patient Vitals for the past 24 hrs:   Temp Pulse Resp BP SpO2   05/19/20 1112     92 %   05/19/20 0940 98.3 °F (36.8 °C) (!) 121 16 149/79 (!) 89 %    Vital signs were reviewed. Physical Exam  Vitals signs and nursing note reviewed. Constitutional:       General: He is not in acute distress. Appearance: He is not ill-appearing or toxic-appearing. HENT:      Head: Normocephalic. Cardiovascular:      Rate and Rhythm: Normal rate and regular rhythm. Pulses: Normal pulses. Heart sounds: Normal heart sounds. Pulmonary:      Effort: No respiratory distress. Breath sounds: Wheezing and rhonchi present. Skin:     General: Skin is warm. Capillary Refill: Capillary refill takes less than 2 seconds. Neurological:      Mental Status: He is alert and oriented to person, place, and time. Psychiatric:         Mood and Affect: Mood normal.         Behavior: Behavior normal.         MEDICAL DECISION MAKING: (Including Differential Dx, and Plan)   MDM  Number of Diagnoses or Management Options  Diagnosis management comments: Well-appearing 81-year-old male presents to the emergency department with shortness of breath wheezing rhonchi tachycardia and mild hypoxia    No fever    No vomiting or diarrhea    Shortness of breath is worse with activity. Better with rest           Amount and/or Complexity of Data Reviewed  Clinical lab tests: ordered and reviewed  Tests in the radiology section of CPT®: ordered and reviewed    Risk of Complications, Morbidity, and/or Mortality  Presenting problems: moderate  Diagnostic procedures: minimal  Management options: moderate            Data/Management: (.addall = .addlabs . addrad . addmeds)  (.addold)     Lab findings during this visit:   Recent Results (from the past 48 hour(s))   EKG, 12 LEAD, INITIAL    Collection Time: 05/19/20  9:41 AM   Result Value Ref Range    Ventricular Rate 119 BPM    Atrial Rate 119 BPM    P-R Interval 168 ms    QRS Duration 78 ms    Q-T Interval 312 ms    QTC Calculation (Bezet) 438 ms    Calculated P Axis 67 degrees    Calculated R Axis 105 degrees    Calculated T Axis 14 degrees    Diagnosis       Sinus tachycardia  Rightward axis  Borderline ECG  No previous ECGs available  Confirmed by Lindsay Watt ([id number]) on 5/19/2020 11:23:11 AM     CBC WITH AUTOMATED DIFF    Collection Time: 05/19/20  9:46 AM   Result Value Ref Range    WBC 10.8 4.3 - 11.1 K/uL    RBC 4.48 4.23 - 5.6 M/uL    HGB 14.8 13.6 - 17.2 g/dL    HCT 45.1 41.1 - 50.3 %    .7 (H) 79.6 - 97.8 FL    MCH 33.0 (H) 26.1 - 32.9 PG    MCHC 32.8 31.4 - 35.0 g/dL    RDW 13.6 11.9 - 14.6 %    PLATELET 194 284 - 545 K/uL    MPV 8.0 (L) 9.4 - 12.3 FL    ABSOLUTE NRBC 0.00 0.0 - 0.2 K/uL    DF AUTOMATED      NEUTROPHILS 35 (L) 43 - 78 %    LYMPHOCYTES 21 13 - 44 %    MONOCYTES 10 4.0 - 12.0 %    EOSINOPHILS 33 (H) 0.5 - 7.8 %    BASOPHILS 1 0.0 - 2.0 %    IMMATURE GRANULOCYTES 0 0.0 - 5.0 %    ABS. NEUTROPHILS 3.8 1.7 - 8.2 K/UL    ABS. LYMPHOCYTES 2.2 0.5 - 4.6 K/UL    ABS. MONOCYTES 1.1 0.1 - 1.3 K/UL    ABS. EOSINOPHILS 3.6 (H) 0.0 - 0.8 K/UL    ABS. BASOPHILS 0.1 0.0 - 0.2 K/UL    ABS. IMM. GRANS. 0.0 0.0 - 0.5 K/UL   METABOLIC PANEL, COMPREHENSIVE    Collection Time: 05/19/20  9:46 AM   Result Value Ref Range    Sodium 137 136 - 145 mmol/L    Potassium 3.9 3.5 - 5.1 mmol/L    Chloride 107 98 - 107 mmol/L    CO2 24 21 - 32 mmol/L    Anion gap 6 (L) 7 - 16 mmol/L    Glucose 79 65 - 100 mg/dL    BUN 14 8 - 23 MG/DL    Creatinine 1.25 0.8 - 1.5 MG/DL    GFR est AA >60 >60 ml/min/1.73m2    GFR est non-AA 59 (L) >60 ml/min/1.73m2    Calcium 9.6 8.3 - 10.4 MG/DL    Bilirubin, total 0.4 0.2 - 1.1 MG/DL    ALT (SGPT) 18 12 - 65 U/L    AST (SGOT) 18 15 - 37 U/L    Alk. phosphatase 227 (H) 50 - 136 U/L    Protein, total 9.6 (H) 6.3 - 8.2 g/dL    Albumin 3.6 3.2 - 4.6 g/dL    Globulin 6.0 (H) 2.3 - 3.5 g/dL    A-G Ratio 0.6 (L) 1.2 - 3.5     LACTIC ACID    Collection Time: 05/19/20  9:46 AM   Result Value Ref Range    Lactic acid 1.3 0.4 - 2.0 MMOL/L     Radiology studies during this visit: Xr Chest Port    Result Date: 5/19/2020  Impression: Mild bibasilar reticular opacities may represent underlying chronic change versus interstitial edema or atypical infection.      Medications given in the ED:   Medications   0.9% sodium chloride infusion 1,000 mL (1,000 mL IntraVENous New Bag 5/19/20 1113)   albuterol-ipratropium (DUO-NEB) 2.5 MG-0.5 MG/3 ML (3 mL Nebulization Given 5/19/20 1051)       Procedure Documentation:   Procedures     Recheck and Additional Documentation:  (use .addrecheck  . addsepsis   . addstroke   . addhip  . addhandoff  . addcctime . emergcnt )     Patient resting. Oxygen saturations are in the mid 90s. Heart rate is in the 80s    Respirations have improved. Decreased wheezing    He is resting comfortably    Given his shortness of breath wheezing with abnormal chest x-ray coronavirus swab was performed. I do not think he needs admission to the hospital.  Will discharge him home with an albuterol inhaler. Gave him instructions on testing for increasing dyspnea. Other ED Course Notes:     ED Course as of May 19 1224   Tue May 19, 2020   1050 Temp: 98.3 °F (36.8 °C) [GH]   1050 Pulse (Heart Rate)(!): 121 [GH]   1050 BP: 149/79 [GH]   1050 O2 Sat (%)(!): 89 % [GH]   1050 XR CHEST PORT [GH]   1050 WBC: 10.8 [GH]      ED Course User Index  [GH] Beatriz Calzada MD       I wore appropriate PPE throughout this patient's ED encounter. Assessment and Plan: (yuli)   Impression: No diagnosis found.    Disposition: Home   Follow-up Information    None       Current Discharge Medication List            Past Medical History:      Past Medical History:   Diagnosis Date    Atherosclerosis of native arteries of the extremities with intermittent claudication 5/13/2014    Chronic kidney disease     elevated labs see's dr Tevin Alvarado ulcer ([facility] [address] 9/29/2017    Former cigarette smoker 5/13/2014    Gout     HTN (hypertension) 5/13/2014    Hypercholesterolemia     Hyperlipidemia 5/13/2014    Peripheral vascular disease ([facility] [address]      Past Surgical History:   Procedure Laterality Date    HX COLONOSCOPY      HX HEMORRHOIDECTOMY      HX MALIGNANT SKIN LESION EXCISION       Social History     Tobacco Use    Smoking status: Former Smoker    Smokeless tobacco: Former User   Substance Use Topics    Alcohol use: Yes     Alcohol/week: 6.7 standard drinks     Types: 8 Shots of liquor per week    Drug use: Not on file     Prior to Admission Medications   Prescriptions Last Dose Informant Patient Reported? Taking? albuterol (ProAir HFA) 90 mcg/actuation inhaler   No No   Sig: Take 2 Puffs by inhalation every four (4) hours as needed for Wheezing or Shortness of Breath. cilostazoL (PLETAL) 100 mg tablet   Yes No   Sig: Take  by mouth Before breakfast and dinner. clopidogreL (Plavix) 75 mg tab   Yes No   Sig: Take 75 mg by mouth. dilTIAZem CD (CARDIZEM CD) 120 mg ER capsule   No No   Sig: Take 1 Cap by mouth daily. doxycycline (MONODOX) 100 mg capsule   No No   Sig: Take 1 Cap by mouth two (2) times a day for 7 days.    probenecid-colchicine (ColBenemid) 500-0.5 mg per tablet   No No   Sig: TAKE ONE TABLET BY MOUTH DAILY      Facility-Administered Medications: None

## 2020-05-19 NOTE — ED TRIAGE NOTES
Pt reports he has had cough and congestion for 3 weeks. Has an inhaler which he states helps some. Pt denies fevers at home but states has not taken temp. Pt reports hx of asthma. Pt denies wearing oxygen at home. NAD.  Has mask on

## 2020-05-20 ENCOUNTER — PATIENT OUTREACH (OUTPATIENT)
Dept: CASE MANAGEMENT | Age: 85
End: 2020-05-20

## 2020-05-21 ENCOUNTER — PATIENT OUTREACH (OUTPATIENT)
Dept: CASE MANAGEMENT | Age: 85
End: 2020-05-21

## 2020-05-21 LAB — EMERGENT DISEASE PANEL, EDPR: NOT DETECTED

## 2020-05-21 NOTE — PROGRESS NOTES
This note will not be viewable in [address] 2nd attempt to contact patient regarding COVID-19 risk education with no answer on mobile phone. CC will be removed from care team and the episode will be closed at this time.

## 2020-06-22 PROBLEM — R06.09 DOE (DYSPNEA ON EXERTION): Status: ACTIVE | Noted: 2020-06-22

## 2020-06-22 PROBLEM — R06.01 ORTHOPNEA: Status: ACTIVE | Noted: 2020-06-22

## 2020-06-22 PROBLEM — R60.9 EDEMA: Status: ACTIVE | Noted: 2020-06-22

## 2020-06-22 PROBLEM — J44.9 CHRONIC OBSTRUCTIVE PULMONARY DISEASE (HCC): Status: ACTIVE | Noted: 2020-06-22

## 2020-06-22 PROBLEM — F17.211 CIGARETTE NICOTINE DEPENDENCE IN REMISSION: Status: ACTIVE | Noted: 2020-06-22

## 2020-07-02 ENCOUNTER — TELEPHONE (OUTPATIENT)
Dept: CARDIOLOGY | Age: 85
End: 2020-07-02

## 2020-07-02 DIAGNOSIS — R06.09 DYSPNEA ON EXERTION: ICD-10-CM

## 2020-07-02 DIAGNOSIS — R60.0 LOCALIZED EDEMA: ICD-10-CM

## 2020-07-03 RX ORDER — FUROSEMIDE 40 MG/1
40 TABLET ORAL DAILY
Qty: 30 TAB | Refills: 1 | Status: SHIPPED | OUTPATIENT
Start: 2020-07-03

## 2020-07-03 NOTE — TELEPHONE ENCOUNTER
Requested Prescriptions     Signed Prescriptions Disp Refills    furosemide (Lasix) 40 mg tablet 30 Tab 1     Sig: Take 1 Tab by mouth daily. Authorizing Provider: Devi Ness     Ordering User: Constance Avilez and spoke with pt. Pt stated that his Lasix was only sent in for 3 tablets. Informed him a new rx will be sent in for 30 tablets. Pt voiced understanding and wanted it sent to [facility] instead of [facility].

## 2020-07-20 ENCOUNTER — HOME HEALTH ADMISSION (OUTPATIENT)
Dept: HOME HEALTH SERVICES | Facility: HOME HEALTH | Age: 85
End: 2020-07-20
Payer: MEDICARE

## 2020-07-21 ENCOUNTER — HOME CARE VISIT (OUTPATIENT)
Dept: HOME HEALTH SERVICES | Facility: HOME HEALTH | Age: 85
End: 2020-07-21

## 2020-07-21 ENCOUNTER — HOME CARE VISIT (OUTPATIENT)
Dept: SCHEDULING | Facility: HOME HEALTH | Age: 85
End: 2020-07-21

## 2020-07-21 ENCOUNTER — HOSPITAL ENCOUNTER (INPATIENT)
Age: 85
LOS: 8 days | Discharge: NURSING, REHAB OR LONG-TERM CARE | DRG: 853 | End: 2020-07-29
Attending: EMERGENCY MEDICINE | Admitting: INTERNAL MEDICINE
Payer: MEDICARE

## 2020-07-21 VITALS — SYSTOLIC BLOOD PRESSURE: 86 MMHG | HEART RATE: 110 BPM | DIASTOLIC BLOOD PRESSURE: 50 MMHG

## 2020-07-21 DIAGNOSIS — D64.9 NORMOCYTIC ANEMIA: ICD-10-CM

## 2020-07-21 DIAGNOSIS — E87.29 HYPERCHLOREMIC ACIDOSIS: ICD-10-CM

## 2020-07-21 DIAGNOSIS — J96.22 ACUTE ON CHRONIC RESPIRATORY FAILURE WITH HYPOXIA AND HYPERCAPNIA (HCC): ICD-10-CM

## 2020-07-21 DIAGNOSIS — F17.211 CIGARETTE NICOTINE DEPENDENCE IN REMISSION: ICD-10-CM

## 2020-07-21 DIAGNOSIS — Z99.11 ENCOUNTER FOR WEANING FROM VENTILATOR (HCC): ICD-10-CM

## 2020-07-21 DIAGNOSIS — L89.159 PRESSURE INJURY OF SKIN OF SACRAL REGION, UNSPECIFIED INJURY STAGE: Primary | ICD-10-CM

## 2020-07-21 DIAGNOSIS — L89.156 PRESSURE INJURY OF DEEP TISSUE OF SACRAL REGION: ICD-10-CM

## 2020-07-21 DIAGNOSIS — E87.5 HYPERKALEMIA: ICD-10-CM

## 2020-07-21 DIAGNOSIS — A41.9 SEVERE SEPSIS (HCC): ICD-10-CM

## 2020-07-21 DIAGNOSIS — J96.21 ACUTE ON CHRONIC RESPIRATORY FAILURE WITH HYPOXIA AND HYPERCAPNIA (HCC): ICD-10-CM

## 2020-07-21 DIAGNOSIS — G93.41 METABOLIC ENCEPHALOPATHY: ICD-10-CM

## 2020-07-21 DIAGNOSIS — E87.1 HYPONATREMIA: ICD-10-CM

## 2020-07-21 DIAGNOSIS — K60.4 RECTOCUTANEOUS FISTULA: ICD-10-CM

## 2020-07-21 DIAGNOSIS — J44.9 CHRONIC OBSTRUCTIVE PULMONARY DISEASE, UNSPECIFIED COPD TYPE (HCC): ICD-10-CM

## 2020-07-21 DIAGNOSIS — R65.20 SEVERE SEPSIS (HCC): ICD-10-CM

## 2020-07-21 DIAGNOSIS — E83.42 HYPOMAGNESEMIA: ICD-10-CM

## 2020-07-21 DIAGNOSIS — N17.9 ACUTE KIDNEY INJURY (HCC): ICD-10-CM

## 2020-07-21 DIAGNOSIS — E77.8 HYPOPROTEINEMIA (HCC): ICD-10-CM

## 2020-07-21 DIAGNOSIS — Z87.891 FORMER CIGARETTE SMOKER: ICD-10-CM

## 2020-07-21 DIAGNOSIS — E87.6 HYPOKALEMIA: ICD-10-CM

## 2020-07-21 DIAGNOSIS — N17.0 ACUTE KIDNEY INJURY (AKI) WITH ACUTE TUBULAR NECROSIS (ATN) (HCC): ICD-10-CM

## 2020-07-21 DIAGNOSIS — R57.9 SHOCK, UNSPECIFIED (HCC): ICD-10-CM

## 2020-07-21 DIAGNOSIS — E43 SEVERE PROTEIN-CALORIE MALNUTRITION (HCC): Chronic | ICD-10-CM

## 2020-07-21 DIAGNOSIS — G93.41 ENCEPHALOPATHY, METABOLIC: ICD-10-CM

## 2020-07-21 PROBLEM — J96.11 CHRONIC HYPOXEMIC RESPIRATORY FAILURE (HCC): Status: ACTIVE | Noted: 2020-07-21

## 2020-07-21 PROBLEM — E44.0 MODERATE PROTEIN MALNUTRITION (HCC): Status: ACTIVE | Noted: 2020-07-21

## 2020-07-21 LAB
ALBUMIN SERPL-MCNC: 1.5 G/DL (ref 3.2–4.6)
ALBUMIN/GLOB SERPL: 0.3 {RATIO} (ref 1.2–3.5)
ALP SERPL-CCNC: 159 U/L (ref 50–136)
ALT SERPL-CCNC: 23 U/L (ref 12–65)
ANION GAP SERPL CALC-SCNC: 12 MMOL/L (ref 7–16)
APTT PPP: 35 SEC (ref 24.3–35.4)
AST SERPL-CCNC: 42 U/L (ref 15–37)
ATRIAL RATE: 107 BPM
BASOPHILS # BLD: 0 K/UL (ref 0–0.2)
BASOPHILS NFR BLD: 0 % (ref 0–2)
BILIRUB SERPL-MCNC: 0.3 MG/DL (ref 0.2–1.1)
BUN SERPL-MCNC: 56 MG/DL (ref 8–23)
CALCIUM SERPL-MCNC: 8.4 MG/DL (ref 8.3–10.4)
CALCULATED P AXIS, ECG09: 0 DEGREES
CALCULATED R AXIS, ECG10: 84 DEGREES
CALCULATED T AXIS, ECG11: 75 DEGREES
CHLORIDE SERPL-SCNC: 90 MMOL/L (ref 98–107)
CO2 SERPL-SCNC: 25 MMOL/L (ref 21–32)
CREAT SERPL-MCNC: 1.74 MG/DL (ref 0.8–1.5)
DIAGNOSIS, 93000: NORMAL
DIFFERENTIAL METHOD BLD: ABNORMAL
EOSINOPHIL # BLD: 0.1 K/UL (ref 0–0.8)
EOSINOPHIL NFR BLD: 1 % (ref 0.5–7.8)
ERYTHROCYTE [DISTWIDTH] IN BLOOD BY AUTOMATED COUNT: 13.1 % (ref 11.9–14.6)
GLOBULIN SER CALC-MCNC: 5.9 G/DL (ref 2.3–3.5)
GLUCOSE SERPL-MCNC: 118 MG/DL (ref 65–100)
HCT VFR BLD AUTO: 27.6 % (ref 41.1–50.3)
HGB BLD-MCNC: 9.9 G/DL (ref 13.6–17.2)
IMM GRANULOCYTES # BLD AUTO: 0.2 K/UL (ref 0–0.5)
IMM GRANULOCYTES NFR BLD AUTO: 2 % (ref 0–5)
INR PPP: 1
LACTATE SERPL-SCNC: 1 MMOL/L (ref 0.4–2)
LACTATE SERPL-SCNC: 1.1 MMOL/L (ref 0.4–2)
LYMPHOCYTES # BLD: 1.5 K/UL (ref 0.5–4.6)
LYMPHOCYTES NFR BLD: 10 % (ref 13–44)
MAGNESIUM SERPL-MCNC: 1.5 MG/DL (ref 1.8–2.4)
MCH RBC QN AUTO: 32.6 PG (ref 26.1–32.9)
MCHC RBC AUTO-ENTMCNC: 35.9 G/DL (ref 31.4–35)
MCV RBC AUTO: 90.8 FL (ref 79.6–97.8)
MONOCYTES # BLD: 2.1 K/UL (ref 0.1–1.3)
MONOCYTES NFR BLD: 14 % (ref 4–12)
NEUTS SEG # BLD: 10.9 K/UL (ref 1.7–8.2)
NEUTS SEG NFR BLD: 73 % (ref 43–78)
NRBC # BLD: 0 K/UL (ref 0–0.2)
PLATELET # BLD AUTO: 442 K/UL (ref 150–450)
PMV BLD AUTO: 9 FL (ref 9.4–12.3)
POTASSIUM SERPL-SCNC: 2.2 MMOL/L (ref 3.5–5.1)
PROT SERPL-MCNC: 7.4 G/DL (ref 6.3–8.2)
PROTHROMBIN TIME: 13.8 SEC (ref 12–14.7)
Q-T INTERVAL, ECG07: 406 MS
QRS DURATION, ECG06: 90 MS
QTC CALCULATION (BEZET), ECG08: 542 MS
RBC # BLD AUTO: 3.04 M/UL (ref 4.23–5.6)
SODIUM SERPL-SCNC: 127 MMOL/L (ref 136–145)
VENTRICULAR RATE, ECG03: 107 BPM
WBC # BLD AUTO: 14.9 K/UL (ref 4.3–11.1)

## 2020-07-21 PROCEDURE — 74011250636 HC RX REV CODE- 250/636: Performed by: INTERNAL MEDICINE

## 2020-07-21 PROCEDURE — 94760 N-INVAS EAR/PLS OXIMETRY 1: CPT

## 2020-07-21 PROCEDURE — 99285 EMERGENCY DEPT VISIT HI MDM: CPT

## 2020-07-21 PROCEDURE — 36415 COLL VENOUS BLD VENIPUNCTURE: CPT

## 2020-07-21 PROCEDURE — 96375 TX/PRO/DX INJ NEW DRUG ADDON: CPT

## 2020-07-21 PROCEDURE — 87040 BLOOD CULTURE FOR BACTERIA: CPT

## 2020-07-21 PROCEDURE — 94640 AIRWAY INHALATION TREATMENT: CPT

## 2020-07-21 PROCEDURE — 74011250636 HC RX REV CODE- 250/636: Performed by: EMERGENCY MEDICINE

## 2020-07-21 PROCEDURE — 96374 THER/PROPH/DIAG INJ IV PUSH: CPT

## 2020-07-21 PROCEDURE — G0299 HHS/HOSPICE OF RN EA 15 MIN: HCPCS

## 2020-07-21 PROCEDURE — 85610 PROTHROMBIN TIME: CPT

## 2020-07-21 PROCEDURE — 74011000258 HC RX REV CODE- 258: Performed by: EMERGENCY MEDICINE

## 2020-07-21 PROCEDURE — 65660000000 HC RM CCU STEPDOWN

## 2020-07-21 PROCEDURE — 74011000258 HC RX REV CODE- 258: Performed by: INTERNAL MEDICINE

## 2020-07-21 PROCEDURE — 85730 THROMBOPLASTIN TIME PARTIAL: CPT

## 2020-07-21 PROCEDURE — 80053 COMPREHEN METABOLIC PANEL: CPT

## 2020-07-21 PROCEDURE — 83605 ASSAY OF LACTIC ACID: CPT

## 2020-07-21 PROCEDURE — 83735 ASSAY OF MAGNESIUM: CPT

## 2020-07-21 PROCEDURE — 93005 ELECTROCARDIOGRAM TRACING: CPT | Performed by: EMERGENCY MEDICINE

## 2020-07-21 PROCEDURE — 85025 COMPLETE CBC W/AUTO DIFF WBC: CPT

## 2020-07-21 PROCEDURE — 74011250637 HC RX REV CODE- 250/637: Performed by: INTERNAL MEDICINE

## 2020-07-21 RX ORDER — MORPHINE SULFATE 2 MG/ML
2 INJECTION, SOLUTION INTRAMUSCULAR; INTRAVENOUS
Status: DISCONTINUED | OUTPATIENT
Start: 2020-07-21 | End: 2020-07-29 | Stop reason: HOSPADM

## 2020-07-21 RX ORDER — SODIUM CHLORIDE 0.9 % (FLUSH) 0.9 %
5-40 SYRINGE (ML) INJECTION AS NEEDED
Status: DISCONTINUED | OUTPATIENT
Start: 2020-07-21 | End: 2020-07-29 | Stop reason: HOSPADM

## 2020-07-21 RX ORDER — ALBUTEROL SULFATE 0.83 MG/ML
2.5 SOLUTION RESPIRATORY (INHALATION)
Status: DISCONTINUED | OUTPATIENT
Start: 2020-07-21 | End: 2020-07-29

## 2020-07-21 RX ORDER — POTASSIUM CHLORIDE 20 MEQ/1
40 TABLET, EXTENDED RELEASE ORAL EVERY 4 HOURS
Status: DISCONTINUED | OUTPATIENT
Start: 2020-07-21 | End: 2020-07-21

## 2020-07-21 RX ORDER — VANCOMYCIN 1.75 GRAM/500 ML IN 0.9 % SODIUM CHLORIDE INTRAVENOUS
1750 ONCE
Status: COMPLETED | OUTPATIENT
Start: 2020-07-21 | End: 2020-07-21

## 2020-07-21 RX ORDER — POTASSIUM CHLORIDE 14.9 MG/ML
20 INJECTION INTRAVENOUS
Status: DISPENSED | OUTPATIENT
Start: 2020-07-21 | End: 2020-07-21

## 2020-07-21 RX ORDER — MAGNESIUM SULFATE HEPTAHYDRATE 40 MG/ML
2 INJECTION, SOLUTION INTRAVENOUS ONCE
Status: COMPLETED | OUTPATIENT
Start: 2020-07-21 | End: 2020-07-22

## 2020-07-21 RX ORDER — POTASSIUM CHLORIDE 20 MEQ/1
40 TABLET, EXTENDED RELEASE ORAL EVERY 4 HOURS
Status: COMPLETED | OUTPATIENT
Start: 2020-07-21 | End: 2020-07-22

## 2020-07-21 RX ORDER — CLINDAMYCIN PHOSPHATE 600 MG/50ML
600 INJECTION INTRAVENOUS EVERY 8 HOURS
Status: DISCONTINUED | OUTPATIENT
Start: 2020-07-21 | End: 2020-07-24

## 2020-07-21 RX ORDER — SODIUM CHLORIDE 0.9 % (FLUSH) 0.9 %
5-40 SYRINGE (ML) INJECTION EVERY 8 HOURS
Status: DISCONTINUED | OUTPATIENT
Start: 2020-07-21 | End: 2020-07-29 | Stop reason: HOSPADM

## 2020-07-21 RX ORDER — ACETAMINOPHEN 325 MG/1
650 TABLET ORAL
Status: DISCONTINUED | OUTPATIENT
Start: 2020-07-21 | End: 2020-07-29 | Stop reason: HOSPADM

## 2020-07-21 RX ORDER — ENOXAPARIN SODIUM 100 MG/ML
40 INJECTION SUBCUTANEOUS EVERY 24 HOURS
Status: DISCONTINUED | OUTPATIENT
Start: 2020-07-21 | End: 2020-07-29

## 2020-07-21 RX ORDER — CLOPIDOGREL BISULFATE 75 MG/1
75 TABLET ORAL DAILY
Status: DISCONTINUED | OUTPATIENT
Start: 2020-07-22 | End: 2020-07-29 | Stop reason: HOSPADM

## 2020-07-21 RX ORDER — PROBENECID AND COLCHICINE 500; .5 MG/1; MG/1
1 TABLET ORAL DAILY
Status: DISCONTINUED | OUTPATIENT
Start: 2020-07-22 | End: 2020-07-29 | Stop reason: HOSPADM

## 2020-07-21 RX ORDER — DILTIAZEM HYDROCHLORIDE 120 MG/1
120 CAPSULE, COATED, EXTENDED RELEASE ORAL DAILY
Status: DISCONTINUED | OUTPATIENT
Start: 2020-07-22 | End: 2020-07-29 | Stop reason: HOSPADM

## 2020-07-21 RX ORDER — LORAZEPAM 0.5 MG/1
1 TABLET ORAL
Status: DISCONTINUED | OUTPATIENT
Start: 2020-07-21 | End: 2020-07-29 | Stop reason: HOSPADM

## 2020-07-21 RX ADMIN — MAGNESIUM SULFATE HEPTAHYDRATE 2 G: 40 INJECTION, SOLUTION INTRAVENOUS at 23:14

## 2020-07-21 RX ADMIN — SODIUM CHLORIDE 1000 ML: 900 INJECTION, SOLUTION INTRAVENOUS at 16:18

## 2020-07-21 RX ADMIN — ENOXAPARIN SODIUM 40 MG: 40 INJECTION SUBCUTANEOUS at 22:02

## 2020-07-21 RX ADMIN — THIAMINE HYDROCHLORIDE 100 MG: 100 INJECTION, SOLUTION INTRAMUSCULAR; INTRAVENOUS at 23:34

## 2020-07-21 RX ADMIN — POTASSIUM CHLORIDE 40 MEQ: 20 TABLET, EXTENDED RELEASE ORAL at 20:03

## 2020-07-21 RX ADMIN — Medication 10 ML: at 22:28

## 2020-07-21 RX ADMIN — CLINDAMYCIN IN 5 PERCENT DEXTROSE 600 MG: 12 INJECTION, SOLUTION INTRAVENOUS at 22:27

## 2020-07-21 RX ADMIN — VANCOMYCIN HYDROCHLORIDE 1750 MG: 10 INJECTION, POWDER, LYOPHILIZED, FOR SOLUTION INTRAVENOUS at 21:51

## 2020-07-21 RX ADMIN — SODIUM CHLORIDE, SODIUM LACTATE, POTASSIUM CHLORIDE, AND CALCIUM CHLORIDE 1000 ML: 600; 310; 30; 20 INJECTION, SOLUTION INTRAVENOUS at 20:56

## 2020-07-21 RX ADMIN — GLYCOPYRROLATE AND FORMOTEROL FUMARATE 2 PUFF: 9; 4.8 AEROSOL, METERED RESPIRATORY (INHALATION) at 19:59

## 2020-07-21 RX ADMIN — PIPERACILLIN AND TAZOBACTAM 4.5 G: 4; .5 INJECTION, POWDER, FOR SOLUTION INTRAVENOUS at 16:18

## 2020-07-21 RX ADMIN — POTASSIUM CHLORIDE 20 MEQ: 14.9 INJECTION, SOLUTION INTRAVENOUS at 18:05

## 2020-07-21 NOTE — ED PROVIDER NOTES
81-year-old white male with history of hypertension, peripheral vascular disease and chronic kidney disease reports that he has had a sacral sore worsening over the past 2 weeks. Since yesterday the pain is becoming unbearable. No fever, vomiting, chest pain or shortness of breath. The history is provided by the patient. Decubitus Ulcer   Pertinent negatives include no chest pain, no abdominal pain, no headaches and no shortness of breath. Past Medical History:   Diagnosis Date    Atherosclerosis of native arteries of the extremities with intermittent claudication 5/13/2014    Chronic kidney disease     elevated labs see's dr Yris Noriega ulcer ([facility] [address] 9/29/2017    Former cigarette smoker 5/13/2014    Gout     HTN (hypertension) 5/13/2014    Hypercholesterolemia     Hyperlipidemia 5/13/2014    Peripheral vascular disease ([facility]        Past Surgical History:   Procedure Laterality Date    HX COLONOSCOPY      HX HEMORRHOIDECTOMY      HX MALIGNANT SKIN LESION EXCISION           Family History:   Problem Relation Age of Onset    Hypertension Mother     Breast Cancer Mother        Social History     Socioeconomic History    Marital status:      Spouse name: Not on file    Number of children: Not on file    Years of education: Not on file    Highest education level: Not on file   Occupational History    Not on file   Social Needs    Financial resource strain: Not on file    Food insecurity     Worry: Not on file     Inability: Not on file    Transportation needs     Medical: Not on file     Non-medical: Not on file   Tobacco Use    Smoking status: Former Smoker     Packs/day: 2.00     Years: 25.00     Pack years: 50.00     Types: Cigarettes    Smokeless tobacco: Former User   Substance and Sexual Activity    Alcohol use:  Yes     Alcohol/week: 6.7 standard drinks     Types: 8 Shots of liquor per week    Drug use: Not on file    Sexual activity: Not on file   Lifestyle    Physical activity     Days per week: Not on file     Minutes per session: Not on file    Stress: Not on file   Relationships    Social connections     Talks on phone: Not on file     Gets together: Not on file     Attends Evangelical service: Not on file     Active member of club or organization: Not on file     Attends meetings of clubs or organizations: Not on file     Relationship status: Not on file    Intimate partner violence     Fear of current or ex partner: Not on file     Emotionally abused: Not on file     Physically abused: Not on file     Forced sexual activity: Not on file   Other Topics Concern    Not on file   Social History Narrative    Not on file         ALLERGIES: Aspirin and Prednisone    Review of Systems   Constitutional: Negative for fever. HENT: Negative for congestion. Respiratory: Negative for cough and shortness of breath. Cardiovascular: Negative for chest pain. Gastrointestinal: Negative for abdominal pain, nausea and vomiting. Genitourinary: Negative for dysuria. Musculoskeletal: Positive for back pain. Negative for neck pain. Skin: Negative for rash. Neurological: Negative for headaches. Vitals:    07/21/20 1526 07/21/20 1601   BP: 119/60 103/53   Pulse: (!) 105 98   Resp: 17 28   Temp: 98.2 °F (36.8 °C)    SpO2: 97% 98%   Weight: 81.6 kg (180 lb)    Height: 5' 8\" (1.727 m)             Physical Exam  Vitals signs and nursing note reviewed. Constitutional:       General: He is not in acute distress. Appearance: Normal appearance. He is not toxic-appearing. HENT:      Head: Normocephalic and atraumatic. Nose: Nose normal.      Mouth/Throat:      Mouth: Mucous membranes are moist.      Pharynx: Oropharynx is clear. Eyes:      Conjunctiva/sclera: Conjunctivae normal.      Pupils: Pupils are equal, round, and reactive to light. Neck:      Musculoskeletal: Normal range of motion and neck supple.    Cardiovascular:      Rate and Rhythm: Tachycardia present. Rhythm irregular. Pulmonary:      Effort: Pulmonary effort is normal.      Breath sounds: Normal breath sounds. Abdominal:      General: There is no distension. Palpations: Abdomen is soft. Tenderness: There is no abdominal tenderness. There is no guarding. Musculoskeletal:      Comments: Gaping full-thickness sacral decubitus approximately 4 cm in diameter with approximately 8 cm diameter surrounding erythema. Skin:     General: Skin is warm and dry. Neurological:      Mental Status: He is alert and oriented to person, place, and time. Psychiatric:         Mood and Affect: Mood normal.         Behavior: Behavior normal.          MDM  Number of Diagnoses or Management Options  Diagnosis management comments: Patient treated with Zosyn due to what appears to be an infected sacral decubitus. Lab work shows leukocytosis 14.9, hyponatremia 127, hypokalemia 2.2 and renal insufficiency creatinine 1.74 BUN 56. Lactic acid is normal.  LFTs unremarkable. Findings discussed with hospitalist for admission for suspected infected sacral decubitus as well as several lab abnormalities including hyponatremia, hypokalemia and acute kidney injury.        Amount and/or Complexity of Data Reviewed  Clinical lab tests: ordered and reviewed  Tests in the medicine section of CPT®: ordered and reviewed    Risk of Complications, Morbidity, and/or Mortality  Presenting problems: moderate  Diagnostic procedures: moderate  Management options: moderate           Procedures

## 2020-07-21 NOTE — ED TRIAGE NOTES
Pt arrives to ED bed via EMS with mask in place, EMS reports pt with bed sore to sacral area, states pt coming from home. Pt with Afib noted on cardiac monitor, -125, pt denies hx of same. Resp even and unlabored, speaking in clear complete sentences without difficulty.

## 2020-07-21 NOTE — ED NOTES
TRANSFER - OUT REPORT:    Verbal report given to Humera BARAJAS(name) on Jennifer Ang  being transferred to [facility](unit) for routine progression of care       Report consisted of patients Situation, Background, Assessment and   Recommendations(SBAR). Information from the following report(s) SBAR, ED Summary, [facility] and Recent Results was reviewed with the receiving nurse. Lines:   Peripheral IV 07/21/20 Left Antecubital (Active)        Opportunity for questions and clarification was provided.       Patient transported with:   [employer]

## 2020-07-21 NOTE — PROGRESS NOTES
TRANSFER - IN REPORT:    Verbal report received from Teresa in ED on Sofia Ryan  being received from ED for routine progression of care      Report consisted of patients Situation, Background, Assessment and   Recommendations(SBAR). Information from the following report(s) SBAR was reviewed with the receiving nurse. Opportunity for questions and clarification was provided. Assessment completed upon patients arrival to unit and care assumed.

## 2020-07-22 ENCOUNTER — APPOINTMENT (OUTPATIENT)
Dept: CT IMAGING | Age: 85
DRG: 853 | End: 2020-07-22
Attending: INTERNAL MEDICINE
Payer: MEDICARE

## 2020-07-22 ENCOUNTER — ANESTHESIA EVENT (OUTPATIENT)
Dept: SURGERY | Age: 85
DRG: 853 | End: 2020-07-22
Payer: MEDICARE

## 2020-07-22 ENCOUNTER — APPOINTMENT (OUTPATIENT)
Dept: GENERAL RADIOLOGY | Age: 85
DRG: 853 | End: 2020-07-22
Attending: INTERNAL MEDICINE
Payer: MEDICARE

## 2020-07-22 PROBLEM — E83.42 HYPOMAGNESEMIA: Status: ACTIVE | Noted: 2020-07-22

## 2020-07-22 LAB
ALBUMIN SERPL-MCNC: 1.2 G/DL (ref 3.2–4.6)
ALBUMIN/GLOB SERPL: 0.2 {RATIO} (ref 1.2–3.5)
ALP SERPL-CCNC: 127 U/L (ref 50–136)
ALT SERPL-CCNC: 20 U/L (ref 12–65)
ANION GAP SERPL CALC-SCNC: 10 MMOL/L (ref 7–16)
ANION GAP SERPL CALC-SCNC: 6 MMOL/L (ref 7–16)
AST SERPL-CCNC: 42 U/L (ref 15–37)
BASOPHILS # BLD: 0 K/UL (ref 0–0.2)
BASOPHILS NFR BLD: 0 % (ref 0–2)
BILIRUB SERPL-MCNC: 0.3 MG/DL (ref 0.2–1.1)
BUN SERPL-MCNC: 30 MG/DL (ref 8–23)
BUN SERPL-MCNC: 44 MG/DL (ref 8–23)
CALCIUM SERPL-MCNC: 6.9 MG/DL (ref 8.3–10.4)
CALCIUM SERPL-MCNC: 8 MG/DL (ref 8.3–10.4)
CHLORIDE SERPL-SCNC: 105 MMOL/L (ref 98–107)
CHLORIDE SERPL-SCNC: 119 MMOL/L (ref 98–107)
CO2 SERPL-SCNC: 19 MMOL/L (ref 21–32)
CO2 SERPL-SCNC: 23 MMOL/L (ref 21–32)
CREAT SERPL-MCNC: 0.94 MG/DL (ref 0.8–1.5)
CREAT SERPL-MCNC: 1.33 MG/DL (ref 0.8–1.5)
DIFFERENTIAL METHOD BLD: ABNORMAL
EOSINOPHIL # BLD: 0.1 K/UL (ref 0–0.8)
EOSINOPHIL NFR BLD: 1 % (ref 0.5–7.8)
ERYTHROCYTE [DISTWIDTH] IN BLOOD BY AUTOMATED COUNT: 13.2 % (ref 11.9–14.6)
GLOBULIN SER CALC-MCNC: 4.9 G/DL (ref 2.3–3.5)
GLUCOSE SERPL-MCNC: 101 MG/DL (ref 65–100)
GLUCOSE SERPL-MCNC: 95 MG/DL (ref 65–100)
HCT VFR BLD AUTO: 26 % (ref 41.1–50.3)
HGB BLD-MCNC: 9 G/DL (ref 13.6–17.2)
IMM GRANULOCYTES # BLD AUTO: 0.2 K/UL (ref 0–0.5)
IMM GRANULOCYTES NFR BLD AUTO: 2 % (ref 0–5)
LYMPHOCYTES # BLD: 1.1 K/UL (ref 0.5–4.6)
LYMPHOCYTES NFR BLD: 10 % (ref 13–44)
MAGNESIUM SERPL-MCNC: 1.9 MG/DL (ref 1.8–2.4)
MCH RBC QN AUTO: 32.3 PG (ref 26.1–32.9)
MCHC RBC AUTO-ENTMCNC: 34.6 G/DL (ref 31.4–35)
MCV RBC AUTO: 93.2 FL (ref 79.6–97.8)
MONOCYTES # BLD: 1.5 K/UL (ref 0.1–1.3)
MONOCYTES NFR BLD: 13 % (ref 4–12)
NEUTS SEG # BLD: 8.3 K/UL (ref 1.7–8.2)
NEUTS SEG NFR BLD: 75 % (ref 43–78)
NRBC # BLD: 0 K/UL (ref 0–0.2)
PLATELET # BLD AUTO: 375 K/UL (ref 150–450)
PMV BLD AUTO: 8.9 FL (ref 9.4–12.3)
POTASSIUM SERPL-SCNC: 2.7 MMOL/L (ref 3.5–5.1)
POTASSIUM SERPL-SCNC: 5 MMOL/L (ref 3.5–5.1)
PROT SERPL-MCNC: 6.1 G/DL (ref 6.3–8.2)
RBC # BLD AUTO: 2.79 M/UL (ref 4.23–5.6)
SODIUM SERPL-SCNC: 138 MMOL/L (ref 136–145)
SODIUM SERPL-SCNC: 144 MMOL/L (ref 136–145)
TSH SERPL DL<=0.005 MIU/L-ACNC: 1.37 UIU/ML (ref 0.36–3.74)
URATE SERPL-MCNC: 11.4 MG/DL (ref 2.6–6)
WBC # BLD AUTO: 11.1 K/UL (ref 4.3–11.1)

## 2020-07-22 PROCEDURE — 71045 X-RAY EXAM CHEST 1 VIEW: CPT

## 2020-07-22 PROCEDURE — 77030040393 HC DRSG OPTIFOAM GENT MDII -B

## 2020-07-22 PROCEDURE — 36415 COLL VENOUS BLD VENIPUNCTURE: CPT

## 2020-07-22 PROCEDURE — 74011000258 HC RX REV CODE- 258: Performed by: INTERNAL MEDICINE

## 2020-07-22 PROCEDURE — 94760 N-INVAS EAR/PLS OXIMETRY 1: CPT

## 2020-07-22 PROCEDURE — 65660000000 HC RM CCU STEPDOWN

## 2020-07-22 PROCEDURE — 74011250636 HC RX REV CODE- 250/636: Performed by: INTERNAL MEDICINE

## 2020-07-22 PROCEDURE — 3E0436Z INTRODUCTION OF NUTRITIONAL SUBSTANCE INTO CENTRAL VEIN, PERCUTANEOUS APPROACH: ICD-10-PCS | Performed by: INTERNAL MEDICINE

## 2020-07-22 PROCEDURE — 74011250637 HC RX REV CODE- 250/637: Performed by: INTERNAL MEDICINE

## 2020-07-22 PROCEDURE — 36573 INSJ PICC RS&I 5 YR+: CPT | Performed by: INTERNAL MEDICINE

## 2020-07-22 PROCEDURE — 74011636320 HC RX REV CODE- 636/320: Performed by: INTERNAL MEDICINE

## 2020-07-22 PROCEDURE — 84550 ASSAY OF BLOOD/URIC ACID: CPT

## 2020-07-22 PROCEDURE — 85025 COMPLETE CBC W/AUTO DIFF WBC: CPT

## 2020-07-22 PROCEDURE — 74011000250 HC RX REV CODE- 250: Performed by: INTERNAL MEDICINE

## 2020-07-22 PROCEDURE — 77030018389 HC SPNG HEMSTAT1 J&J -B

## 2020-07-22 PROCEDURE — 74011000250 HC RX REV CODE- 250: Performed by: SURGERY

## 2020-07-22 PROCEDURE — C1751 CATH, INF, PER/CENT/MIDLINE: HCPCS

## 2020-07-22 PROCEDURE — 02HV33Z INSERTION OF INFUSION DEVICE INTO SUPERIOR VENA CAVA, PERCUTANEOUS APPROACH: ICD-10-PCS | Performed by: INTERNAL MEDICINE

## 2020-07-22 PROCEDURE — 94640 AIRWAY INHALATION TREATMENT: CPT

## 2020-07-22 PROCEDURE — 74177 CT ABD & PELVIS W/CONTRAST: CPT

## 2020-07-22 PROCEDURE — 84443 ASSAY THYROID STIM HORMONE: CPT

## 2020-07-22 PROCEDURE — 83735 ASSAY OF MAGNESIUM: CPT

## 2020-07-22 PROCEDURE — 80053 COMPREHEN METABOLIC PANEL: CPT

## 2020-07-22 RX ORDER — SODIUM CHLORIDE 0.9 % (FLUSH) 0.9 %
20 SYRINGE (ML) INJECTION EVERY 8 HOURS
Status: DISCONTINUED | OUTPATIENT
Start: 2020-07-22 | End: 2020-07-29 | Stop reason: HOSPADM

## 2020-07-22 RX ORDER — SODIUM CHLORIDE 9 MG/ML
125 INJECTION, SOLUTION INTRAVENOUS CONTINUOUS
Status: DISCONTINUED | OUTPATIENT
Start: 2020-07-22 | End: 2020-07-22

## 2020-07-22 RX ORDER — POTASSIUM CHLORIDE 20 MEQ/1
40 TABLET, EXTENDED RELEASE ORAL EVERY 4 HOURS
Status: COMPLETED | OUTPATIENT
Start: 2020-07-22 | End: 2020-07-22

## 2020-07-22 RX ORDER — POTASSIUM CHLORIDE 14.9 MG/ML
20 INJECTION INTRAVENOUS
Status: DISPENSED | OUTPATIENT
Start: 2020-07-22 | End: 2020-07-22

## 2020-07-22 RX ORDER — SODIUM CHLORIDE 0.9 % (FLUSH) 0.9 %
10 SYRINGE (ML) INJECTION
Status: COMPLETED | OUTPATIENT
Start: 2020-07-22 | End: 2020-07-22

## 2020-07-22 RX ORDER — MAGNESIUM SULFATE HEPTAHYDRATE 40 MG/ML
2 INJECTION, SOLUTION INTRAVENOUS ONCE
Status: COMPLETED | OUTPATIENT
Start: 2020-07-22 | End: 2020-07-22

## 2020-07-22 RX ORDER — SODIUM CHLORIDE 9 MG/ML
83 INJECTION, SOLUTION INTRAVENOUS CONTINUOUS
Status: DISPENSED | OUTPATIENT
Start: 2020-07-22 | End: 2020-07-24

## 2020-07-22 RX ADMIN — MAGNESIUM SULFATE HEPTAHYDRATE 2 G: 40 INJECTION, SOLUTION INTRAVENOUS at 08:23

## 2020-07-22 RX ADMIN — SODIUM CHLORIDE 100 ML: 900 INJECTION, SOLUTION INTRAVENOUS at 12:52

## 2020-07-22 RX ADMIN — POTASSIUM CHLORIDE: 2 INJECTION, SOLUTION, CONCENTRATE INTRAVENOUS at 17:03

## 2020-07-22 RX ADMIN — GLYCOPYRROLATE AND FORMOTEROL FUMARATE 2 PUFF: 9; 4.8 AEROSOL, METERED RESPIRATORY (INHALATION) at 21:10

## 2020-07-22 RX ADMIN — CLINDAMYCIN IN 5 PERCENT DEXTROSE 600 MG: 12 INJECTION, SOLUTION INTRAVENOUS at 11:11

## 2020-07-22 RX ADMIN — POTASSIUM CHLORIDE 20 MEQ: 200 INJECTION, SOLUTION INTRAVENOUS at 09:15

## 2020-07-22 RX ADMIN — Medication 10 ML: at 12:52

## 2020-07-22 RX ADMIN — CLINDAMYCIN IN 5 PERCENT DEXTROSE 600 MG: 12 INJECTION, SOLUTION INTRAVENOUS at 03:02

## 2020-07-22 RX ADMIN — POTASSIUM CHLORIDE 20 MEQ: 200 INJECTION, SOLUTION INTRAVENOUS at 14:13

## 2020-07-22 RX ADMIN — POTASSIUM CHLORIDE 40 MEQ: 20 TABLET, EXTENDED RELEASE ORAL at 14:13

## 2020-07-22 RX ADMIN — PIPERACILLIN AND TAZOBACTAM 3.38 G: 3; .375 INJECTION, POWDER, FOR SOLUTION INTRAVENOUS at 08:21

## 2020-07-22 RX ADMIN — CLOPIDOGREL BISULFATE 75 MG: 75 TABLET ORAL at 08:18

## 2020-07-22 RX ADMIN — DAKIN'S SOLUTION 0.125% (QUARTER STRENGTH): 0.12 SOLUTION at 14:13

## 2020-07-22 RX ADMIN — POTASSIUM CHLORIDE 40 MEQ: 20 TABLET, EXTENDED RELEASE ORAL at 05:16

## 2020-07-22 RX ADMIN — Medication 10 ML: at 14:00

## 2020-07-22 RX ADMIN — ENOXAPARIN SODIUM 40 MG: 40 INJECTION SUBCUTANEOUS at 21:44

## 2020-07-22 RX ADMIN — SODIUM CHLORIDE 83 ML/HR: 9 INJECTION, SOLUTION INTRAVENOUS at 18:00

## 2020-07-22 RX ADMIN — POTASSIUM CHLORIDE 40 MEQ: 20 TABLET, EXTENDED RELEASE ORAL at 00:01

## 2020-07-22 RX ADMIN — POTASSIUM CHLORIDE 40 MEQ: 20 TABLET, EXTENDED RELEASE ORAL at 08:18

## 2020-07-22 RX ADMIN — PIPERACILLIN AND TAZOBACTAM 3.38 G: 3; .375 INJECTION, POWDER, FOR SOLUTION INTRAVENOUS at 16:29

## 2020-07-22 RX ADMIN — PIPERACILLIN AND TAZOBACTAM 3.38 G: 3; .375 INJECTION, POWDER, FOR SOLUTION INTRAVENOUS at 00:00

## 2020-07-22 RX ADMIN — Medication 10 ML: at 21:46

## 2020-07-22 RX ADMIN — SOYBEAN OIL 250 ML: 20 INJECTION, SOLUTION INTRAVENOUS at 17:14

## 2020-07-22 RX ADMIN — Medication 10 ML: at 05:17

## 2020-07-22 RX ADMIN — VANCOMYCIN HYDROCHLORIDE 1000 MG: 1 INJECTION, POWDER, LYOPHILIZED, FOR SOLUTION INTRAVENOUS at 21:44

## 2020-07-22 RX ADMIN — DAKIN'S SOLUTION 0.125% (QUARTER STRENGTH): 0.12 SOLUTION at 17:55

## 2020-07-22 RX ADMIN — SODIUM CHLORIDE 125 ML/HR: 9 INJECTION, SOLUTION INTRAVENOUS at 08:04

## 2020-07-22 RX ADMIN — SODIUM CHLORIDE 83 ML/HR: 9 INJECTION, SOLUTION INTRAVENOUS at 21:48

## 2020-07-22 RX ADMIN — Medication 20 ML: at 17:00

## 2020-07-22 RX ADMIN — POTASSIUM CHLORIDE 20 MEQ: 200 INJECTION, SOLUTION INTRAVENOUS at 16:28

## 2020-07-22 RX ADMIN — DIATRIZOATE MEGLUMINE AND DIATRIZOATE SODIUM 15 ML: 600; 100 SOLUTION ORAL; RECTAL at 08:00

## 2020-07-22 RX ADMIN — SODIUM CHLORIDE 500 ML: 9 INJECTION, SOLUTION INTRAVENOUS at 08:05

## 2020-07-22 RX ADMIN — CLINDAMYCIN IN 5 PERCENT DEXTROSE 600 MG: 12 INJECTION, SOLUTION INTRAVENOUS at 21:44

## 2020-07-22 RX ADMIN — SODIUM CHLORIDE 125 ML/HR: 9 INJECTION, SOLUTION INTRAVENOUS at 12:11

## 2020-07-22 RX ADMIN — POTASSIUM CHLORIDE 20 MEQ: 200 INJECTION, SOLUTION INTRAVENOUS at 12:08

## 2020-07-22 RX ADMIN — IOPAMIDOL 100 ML: 755 INJECTION, SOLUTION INTRAVENOUS at 12:52

## 2020-07-22 NOTE — PROGRESS NOTES
Pharmacokinetic Consult to Pharmacist    Yi Dunn is a 81 y.o. male being treated for skin and structure infection  with vanc/zosyn/clindamycin. Height: 5' 8\" (172.7 cm)  Weight: 81.6 kg (180 lb)  Lab Results   Component Value Date/Time    BUN 56 (H) 07/21/2020 03:32 PM    Creatinine 1.74 (H) 07/21/2020 03:32 PM    WBC 14.9 (H) 07/21/2020 03:32 PM    Lactic acid 1.0 07/21/2020 08:15 PM      Estimated Creatinine Clearance: 30.6 mL/min (A) (based on SCr of 1.74 mg/dL (H)). CULTURES:  bc x1 pending  UA pending    Day 1 of vancomycin. Goal trough is 15-20mcg/ml. Vancomycin dose initiated at 1.75gm with pharmacy to pulse dose per protocol. Will continue to follow patient and order levels when clinically indicated.     Thank you for this consult,  Gloria Rausch, PharmD

## 2020-07-22 NOTE — CONSULTS
Comprehensive Nutrition Assessment    Type and Reason for Visit: Consult  GNM/TPN Management (Hospitalist)  BPA PU and MST  Nutrition Recommendations/Plan: Initiate PPN this evening, PICC placement pending. Transition to TPN 7/23. Nutrition Assessment:  Admitted w severe sepsis secondary to sacral fistula versus abscess versus other. hx of COPD, PVD, gout, CKD, and ETOH abuse. To OR for sacral wound debridement with Dr. Gabriela Em on 7/23. Pt reports hx of fall ~10 days ago with minimal mobility and oral intake since that time. He reports his family has been providing foods and beverages at best during this time he was able to consume [place] half a handful of food. \"  He c/o early satiety and abdominal fullness. He reports prior to this acute decline his overall intake had been declining for ~ 6 months. He has experienced wt loss of 4% over the past 2 months and a total of 8% over the past year based on current bed weight. This weight is likely falsely elevated secondary to edema.     Malnutrition Assessment:  Malnutrition Status:  Severe malnutrition    Context:  Acute illness     Findings of the 6 clinical characteristics of malnutrition:   Energy Intake:  7 - 50% or less of est energy requirements for 5 or more days  Body Fat Loss:  7 - Moderate body fat loss, Triceps   Muscle Mass Loss:  7 - Moderate muscle mass loss, Calf, Clavicles (pectoralis & deltoids), Hand (interosseous), Scapula (trapezius), Temples (temporalis)    Estimated Daily Nutrient Needs:  Energy (kcal):  9792-4635(30-35kcal/kg IBW 70kg secondary edema, hx CKD, wound)  Protein (g):  105-150(1.5-2 g/kg IBW d/t wnd)       Fluid (ml/day):  1ml/kcal/day  CHO limit per day: 403 grams CHO/day (4mg/kg/min/day)        Nutrition Related Findings:       Pertinent Medications: NS @ 125 ml/hr, plavix, klor con (40 meq), KCL (20 meq times 2 doses thus far today, 1 dose missed and 3 additional doses scheduled), zosyn, thiamine  Lab Results   Component Value Date/Time    Sodium 138 07/22/2020 05:20 AM    Potassium 2.7 (LL) 07/22/2020 05:20 AM    Chloride 105 07/22/2020 05:20 AM    CO2 23 07/22/2020 05:20 AM    Anion gap 10 07/22/2020 05:20 AM    Glucose 95 07/22/2020 05:20 AM    BUN 44 (H) 07/22/2020 05:20 AM    Creatinine 1.33 07/22/2020 05:20 AM    Calcium 8.0 (L) 07/22/2020 05:20 AM    Albumin 1.2 (L) 07/22/2020 05:20 AM   Mg 1.9    Wounds:      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 · BMI Categories:  Overweight (BMI 25.0-29. 9)       Nutrition Diagnosis:   · Severe malnutrition related to catabolic illness, inadequate protein-energy intake as evidenced by (malnutrition criteria as identified above)      Nutrition Interventions:   Food and/or Nutrient Delivery: Start parenteral nutrition   Start TPN: Only peripheral access available at Rx cut-off time for PN formulation  5%DEX/ 4.25%AA at 1 liter with 250 ml 20% Lipids daily  To provide: 840 kcal/d (40% of needs), 43 grams of protein/d (41% of needs), 50 grams of CHO/d (does not exceed maximum CHO load) and ~1250 ml of total volume/d (~60% of needs). Lytes/L of TPN:   Sodium 30 meq (30 meq NaCl), Potassium 50 meq (25 meq KCl, 25 meq KPO4), 5 meq Mg, 4.5 meq Calcium  Other additives: MTE, MVI  Osmolarity 880  IVF:  Adjust once TPN starts decrease to 83 ml/hr. Vitamin and Mineral Supplement Therapy:  Nutrition support orders for electrolyte management replacement are activated and placed on the MAR. Current replacements active per MD.   Labs:   BMP, Phos, Mg, Triglyceride in am.  POC Glucoses/SSI if Glucose > 180 mg/dl on AM BMP.   Coordination of Nutrition Care: (Discussed w  John via [facility], RN on unit)    Goals:  Meet > 75 % estimated needs within 7 days       Nutrition Monitoring and Evaluation:   Food/Nutrient Intake Outcomes: Parenteral nutrition intake/tolerance  Physical Signs/Symptoms Outcomes: Biochemical data    Discharge Planning:     Too soon to determine     Electronically signed by Bhumi Dobbins MA, RD, LDN on 7/22/2020 at 1:25 PM  Contact: [phone]

## 2020-07-22 NOTE — PROGRESS NOTES
Hospitalist Progress Note     Admit Date:  2020  3:20 PM   Name:  Elenor Sacks   Age:  81 y.o.  :  1935   MRN:  [id number]   PCP:  Josue Castaneda MD  Treatment Team: Attending Provider: Tabitha Fernandez DO; Primary Nurse: Rick Stahl, RN; Consulting Provider: Shaun Meneses MD    Subjective:   HPI and or CC:  Copied from admission history and physical HPI:  History of presentation 52-year-old male former smoker with oxygen dependent COPD,peripheral vascular disease, tophaceous gout, ckd with creat 1.1 2019 and 1.25 in May 2020, alcohol use, and poor oral intake. He reports that his dyspnea started sometime in March around allergy season and progressed. Seen multiple providers and recent echocardiogram with preserved LVEF and no severe pulmonary hypertension. He was tested for COVID-19 sometime in the last 6 weeks and negative. No fevers. No upper respiratory infectious complaints. On presentation found to have serum sodium of 127 serum potassium of 2.2 a serum creatinine of 1.7 and a white blood count of 14,000 with left shift and sepsis criteria. Lactic acid normal.  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 Has been given Zosyn in emergency department. Patient reportedly lives with his grandson and spends most of the day in a chair and is not ambulatory due to tophaceous gout and peripheral vascular disease with claudication. He has not been eating or drinking well for several months. He had people preparing meals for him 3 times daily as his children look in on him several times daily. He has been more isolated with recent COVID 19 social distancing measures. He appears to have severe debility worrisome markers of severe sepsis and a large fistula versus deep decubitus with fistulization versus other.   He will need antibiotics and imaging and surgical intervention but does not appear to have acute abscess in need of urgent surgical intervention and will correct metabolic abnormalities preoperatively. Remarkably pain is not severe. He does have weight loss unspecified amount. Addendum 6:50 PMwas able to reach son Patrick Bello and daughter Beatriz Simmons apparently patient has been refusing staying in bed and would only stay in chair. Family arranged for home health and recently obtained a chairlift for the patient. They were notified by home health nursing today of sacral region wound which prompted ER evaluation. No known previous infection/fistula or abscess. They do confirm that patient oral intake of both food and liquids has been poor. They are frustrated about not being able to be with patient in ER and went daily updates. Discussed Burtis Boas as person to update and communicate with family    July 21, 2020:  Chief complaintweakness, dry mouth, dyspnea    Dyspnea significantly improved with IV hydration partial correction of electrolyte abnormalities serum sodium approaching normal and serum potassium still 2.7. Significant hypoalbuminemia with IV hydration drop 1.2. Will likely need long-term vascular access as he may have prolonged n.p.o., and may benefit from total parenteral nutrition also may need long-term IV antibiotics. Consult placed for PICC line. Needs aggressive maintenance IV with borderline hypotension. Repeat lactic acid normal last night. Severe sepsis with resolving acute kidney injury on baseline chronic kidney disease unknown stage with serum creatinine being 1.1 within the last 6 months. Surgical consult is pending. CT scan with oral and rectal contrast ordered for this morning to evaluate rectopelvic region. Suspect fistulization as he reports that his bowel movements recently have just been some clear liquid and his oral intake has been very poor last several weeks to months.   Family was attempting to feed but he refused and they did arrange for home health and aware chairlift as he desired to spend most of his time during the day in a chair. He is unsure of his last alcoholic drink but probably within the last few days and he reports that with recent illness he has significantly cut back to several unmeasured drinks daily. No prior history of delirium tremens or severe alcohol withdrawal.  He and family are aware of severity of his acute illness and prehospital at least moderate likely severe malnutrition. Patient and family aware of possibility of significant worsening in clinical status. Patient and family continue to request full CODE STATUS. Family once daily update. They are aware objective:     Patient Vitals for the past 24 hrs:   Temp Pulse Resp BP SpO2   07/22/20 0416 98.9 °F (37.2 °C) 91 18 (!) 87/53 98 %   07/21/20 1959     96 %   07/21/20 1928 98.5 °F (36.9 °C) (!) 102 20 107/57 97 %   07/21/20 1827  93 24  100 %   07/21/20 1816 97.7 °F (36.5 °C)  18     07/21/20 1800  (!) 101 22 109/53 99 %   07/21/20 1745  (!) 103 22  98 %   07/21/20 1731    101/63    07/21/20 1601  98 28 103/53 98 %   07/21/20 1526 98.2 °F (36.8 °C) (!) 105 17 119/60 97 %     Oxygen Therapy  O2 Sat (%): 98 % (07/22/20 0416)  Pulse via Oximetry: 104 beats per minute (07/21/20 1959)  O2 Device: Room air (07/21/20 1959)    Intake/Output Summary (Last 24 hours) at 7/22/2020 0758  Last data filed at 7/22/2020 0605  Gross per 24 hour   Intake    Output 800 ml   Net -800 ml         REVIEW OF SYSTEMS: Comprehensive ROS performed and negative except as stated in HPI. Physical Examination:  General:    More alert todayclaims he feels significantly improved with decreased dyspneacomplaining of dry mouth  Head:  Normocephalic, atraumatic, nares patent oral mucous membranes dry  Eyes:  Extraocular movements intact, normal sclera  CV:   RRR. No  Murmurs, clicks, or gallops, distal peripheral pulses decreased to feet but no acral cyanosis.   Lungs:   Unlabored, no cyanosis, no wheeze  Abdomen:   Soft, nondistended, nontender. Did not examine perirectal region this morning. Extremities: Warm and dry. No cyanosis or edema. Large tophi prepatellar and olecranon right  Skin:     No rashes or jaundice. Neuro:  No gross focal deficits, no tremor  Psych:  Mood and affect appropriate    Data Review:  I have reviewed all labs, meds, telemetry events, and studies from the last 24 hours. Recent Results (from the past 24 hour(s))   EKG, 12 LEAD, INITIAL    Collection Time: 07/21/20  3:29 PM   Result Value Ref Range    Ventricular Rate 107 BPM    Atrial Rate 107 BPM    QRS Duration 90 ms    Q-T Interval 406 ms    QTC Calculation (Bezet) 542 ms    Calculated P Axis 0 degrees    Calculated R Axis 84 degrees    Calculated T Axis 75 degrees    Diagnosis       Sinus tachycardia with Premature supraventricular complexes  Otherwise normal ECG  When compared with ECG of 19-MAY-2020 09:41,  Premature supraventricular complexes are now Present  T wave inversion no longer evident in Inferior leads  Confirmed by Sloane Henriquez ([id number]) on 7/21/2020 8:47:11 PM     CBC WITH AUTOMATED DIFF    Collection Time: 07/21/20  3:32 PM   Result Value Ref Range    WBC 14.9 (H) 4.3 - 11.1 K/uL    RBC 3.04 (L) 4.23 - 5.6 M/uL    HGB 9.9 (L) 13.6 - 17.2 g/dL    HCT 27.6 (L) 41.1 - 50.3 %    MCV 90.8 79.6 - 97.8 FL    MCH 32.6 26.1 - 32.9 PG    MCHC 35.9 (H) 31.4 - 35.0 g/dL    RDW 13.1 11.9 - 14.6 %    PLATELET 701 792 - 707 K/uL    MPV 9.0 (L) 9.4 - 12.3 FL    ABSOLUTE NRBC 0.00 0.0 - 0.2 K/uL    DF AUTOMATED      NEUTROPHILS 73 43 - 78 %    LYMPHOCYTES 10 (L) 13 - 44 %    MONOCYTES 14 (H) 4.0 - 12.0 %    EOSINOPHILS 1 0.5 - 7.8 %    BASOPHILS 0 0.0 - 2.0 %    IMMATURE GRANULOCYTES 2 0.0 - 5.0 %    ABS. NEUTROPHILS 10.9 (H) 1.7 - 8.2 K/UL    ABS. LYMPHOCYTES 1.5 0.5 - 4.6 K/UL    ABS. MONOCYTES 2.1 (H) 0.1 - 1.3 K/UL    ABS. EOSINOPHILS 0.1 0.0 - 0.8 K/UL    ABS. BASOPHILS 0.0 0.0 - 0.2 K/UL    ABS. IMM.  GRANS. 0.2 0.0 - 0.5 K/UL   METABOLIC PANEL, COMPREHENSIVE    Collection Time: 07/21/20  3:32 PM   Result Value Ref Range    Sodium 127 (L) 136 - 145 mmol/L    Potassium 2.2 (LL) 3.5 - 5.1 mmol/L    Chloride 90 (L) 98 - 107 mmol/L    CO2 25 21 - 32 mmol/L    Anion gap 12 7 - 16 mmol/L    Glucose 118 (H) 65 - 100 mg/dL    BUN 56 (H) 8 - 23 MG/DL    Creatinine 1.74 (H) 0.8 - 1.5 MG/DL    GFR est AA 48 (L) >60 ml/min/1.73m2    GFR est non-AA 40 (L) >60 ml/min/1.73m2    Calcium 8.4 8.3 - 10.4 MG/DL    Bilirubin, total 0.3 0.2 - 1.1 MG/DL    ALT (SGPT) 23 12 - 65 U/L    AST (SGOT) 42 (H) 15 - 37 U/L    Alk.  phosphatase 159 (H) 50 - 136 U/L    Protein, total 7.4 6.3 - 8.2 g/dL    Albumin 1.5 (L) 3.2 - 4.6 g/dL    Globulin 5.9 (H) 2.3 - 3.5 g/dL    A-G Ratio 0.3 (L) 1.2 - 3.5     LACTIC ACID    Collection Time: 07/21/20  4:03 PM   Result Value Ref Range    Lactic acid 1.1 0.4 - 2.0 MMOL/L   LACTIC ACID    Collection Time: 07/21/20  8:15 PM   Result Value Ref Range    Lactic acid 1.0 0.4 - 2.0 MMOL/L   PROTHROMBIN TIME + INR    Collection Time: 07/21/20  8:15 PM   Result Value Ref Range    Prothrombin time 13.8 12.0 - 14.7 sec    INR 1.0     PTT    Collection Time: 07/21/20  8:15 PM   Result Value Ref Range    aPTT 35.0 24.3 - 35.4 SEC   CULTURE, BLOOD    Collection Time: 07/21/20  8:15 PM    Specimen: Blood   Result Value Ref Range    Special Requests: LEFT  Antecubital        Culture result: NO GROWTH AFTER 8 HOURS     MAGNESIUM    Collection Time: 07/21/20  8:15 PM   Result Value Ref Range    Magnesium 1.5 (L) 1.8 - 2.4 mg/dL   METABOLIC PANEL, COMPREHENSIVE    Collection Time: 07/22/20  5:20 AM   Result Value Ref Range    Sodium 138 136 - 145 mmol/L    Potassium 2.7 (LL) 3.5 - 5.1 mmol/L    Chloride 105 98 - 107 mmol/L    CO2 23 21 - 32 mmol/L    Anion gap 10 7 - 16 mmol/L    Glucose 95 65 - 100 mg/dL    BUN 44 (H) 8 - 23 MG/DL    Creatinine 1.33 0.8 - 1.5 MG/DL    GFR est AA >60 >60 ml/min/1.73m2    GFR est non-AA 54 (L) >60 ml/min/1.73m2    Calcium 8.0 (L) 8.3 - 10.4 MG/DL    Bilirubin, total 0.3 0.2 - 1.1 MG/DL    ALT (SGPT) 20 12 - 65 U/L    AST (SGOT) 42 (H) 15 - 37 U/L    Alk. phosphatase 127 50 - 136 U/L    Protein, total 6.1 (L) 6.3 - 8.2 g/dL    Albumin 1.2 (L) 3.2 - 4.6 g/dL    Globulin 4.9 (H) 2.3 - 3.5 g/dL    A-G Ratio 0.2 (L) 1.2 - 3.5     MAGNESIUM    Collection Time: 07/22/20  5:20 AM   Result Value Ref Range    Magnesium 1.9 1.8 - 2.4 mg/dL   CBC WITH AUTOMATED DIFF    Collection Time: 07/22/20  5:20 AM   Result Value Ref Range    WBC 11.1 4.3 - 11.1 K/uL    RBC 2.79 (L) 4.23 - 5.6 M/uL    HGB 9.0 (L) 13.6 - 17.2 g/dL    HCT 26.0 (L) 41.1 - 50.3 %    MCV 93.2 79.6 - 97.8 FL    MCH 32.3 26.1 - 32.9 PG    MCHC 34.6 31.4 - 35.0 g/dL    RDW 13.2 11.9 - 14.6 %    PLATELET 918 067 - 311 K/uL    MPV 8.9 (L) 9.4 - 12.3 FL    ABSOLUTE NRBC 0.00 0.0 - 0.2 K/uL    DF AUTOMATED      NEUTROPHILS 75 43 - 78 %    LYMPHOCYTES 10 (L) 13 - 44 %    MONOCYTES 13 (H) 4.0 - 12.0 %    EOSINOPHILS 1 0.5 - 7.8 %    BASOPHILS 0 0.0 - 2.0 %    IMMATURE GRANULOCYTES 2 0.0 - 5.0 %    ABS. NEUTROPHILS 8.3 (H) 1.7 - 8.2 K/UL    ABS. LYMPHOCYTES 1.1 0.5 - 4.6 K/UL    ABS. MONOCYTES 1.5 (H) 0.1 - 1.3 K/UL    ABS. EOSINOPHILS 0.1 0.0 - 0.8 K/UL    ABS. BASOPHILS 0.0 0.0 - 0.2 K/UL    ABS. IMM.  GRANS. 0.2 0.0 - 0.5 K/UL   URIC ACID    Collection Time: 07/22/20  5:20 AM   Result Value Ref Range    Uric acid 11.4 (H) 2.6 - 6.0 MG/DL   TSH 3RD GENERATION    Collection Time: 07/22/20  5:20 AM   Result Value Ref Range    TSH 1.370 0.358 - 3.740 uIU/mL        All Micro Results     Procedure Component Value Units Date/Time    CULTURE, BLOOD [[id number]] Collected:  07/21/20 2015    Order Status:  Completed Specimen:  Blood Updated:  07/22/20 0618     Special Requests: --        LEFT  Antecubital       Culture result: NO GROWTH AFTER 8 HOURS       CULTURE, URINE [[id number]]     Order Status:  Sent Specimen:  Cath Urine           Current Meds:  Current Facility-Administered Medications   Medication Dose Route Frequency    potassium chloride (K-DUR, KLOR-CON) SR tablet 40 mEq  40 mEq Oral Q4H    0.9% sodium chloride infusion  125 mL/hr IntraVENous CONTINUOUS    potassium chloride 20 mEq in 100 ml IVPB  20 mEq IntraVENous Q2H    magnesium sulfate 2 g/50 ml IVPB (premix or compounded)  2 g IntraVENous ONCE    sodium chloride 0.9 % bolus infusion 100 mL  100 mL IntraVENous RAD ONCE    diatrizoate iris-diatrizoat sod (MD-GASTROVIEW,GASTROGRAFIN) 66-10 % contrast solution 15 mL  15 mL Oral RAD ONCE    iopamidoL (ISOVUE-370) 76 % injection 100 mL  100 mL IntraVENous RAD ONCE    saline peripheral flush soln 10 mL  10 mL InterCATHeter RAD ONCE    sodium chloride (NS) flush 5-40 mL  5-40 mL IntraVENous Q8H    sodium chloride (NS) flush 5-40 mL  5-40 mL IntraVENous PRN    clindamycin (CLEOCIN) 600mg D5W 50mL IVPB (premix)  600 mg IntraVENous Q8H    enoxaparin (LOVENOX) injection 40 mg  40 mg SubCUTAneous Q24H    clopidogreL (PLAVIX) tablet 75 mg  75 mg Oral DAILY    albuterol (PROVENTIL VENTOLIN) nebulizer solution 2.5 mg  2.5 mg Nebulization Q4H PRN    dilTIAZem ER (CARDIZEM CD) capsule 120 mg  120 mg Oral DAILY    probenecid-colchicine (ColBenemid) tablet 1 Tab (Patient Supplied)  1 Tab Oral DAILY    glycopyrrolate-formoterol (BEVESPI AEROSPHERE) 9 mcg-4.8 mcg inhaler  2 Puff Inhalation BID    piperacillin-tazobactam (ZOSYN) 3.375 g in 0.9% sodium chloride (MBP/ADV) 100 mL  3.375 g IntraVENous Q8H    thiamine (B-1) 100 mg in 0.9% sodium chloride 50 mL IVPB  100 mg IntraVENous DAILY    LORazepam (ATIVAN) tablet 1 mg  1 mg Oral Q6H PRN    morphine injection 2 mg  2 mg IntraVENous Q4H PRN    acetaminophen (TYLENOL) tablet 650 mg  650 mg Oral Q4H PRN    Vancomycin Intermittent dosing- pharmacy dosing per levels   Other Rx Dosing/Monitoring       Diet:  DIET NPO    Other Studies (last 24 hours):  No results found.     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  ICD-9-CM: 565.1  7/21/2020 - Present Unknown        Hyponatremia ICD-10-CM: E87.1  ICD-9-CM: 276.1  7/21/2020 - Present Unknown        Hypokalemia ICD-10-CM: E87.6  ICD-9-CM: 276.8  7/21/2020 - Present Unknown        Acute kidney injury (MARY) with acute tubular necrosis (ATN) (HCC) ICD-10-CM: N17.0  ICD-9-CM: 584.5  7/21/2020 - Present Unknown        Chronic hypoxemic respiratory failure (HCC) ICD-10-CM: J96.11  ICD-9-CM: 518.83, 799.02  7/21/2020 - Present Unknown        Normocytic anemia ICD-10-CM: D64.9  ICD-9-CM: 285.9  7/21/2020 - Present Unknown        Moderate protein malnutrition (HCC) ICD-10-CM: E44.0  ICD-9-CM: 263.0  7/21/2020 - Present Unknown        Chronic obstructive pulmonary disease ([facility] ICD-10-CM: J44.9  ICD-9-CM: 496  6/22/2020 - Present Yes        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 Severe sepsis secondary to sacral fistula versus abscess versus other  borderline hypotension this a.m. but no current maintenance IV fluidsaline 125 cc/h and follow. May need repeat bolus if remains hypotensive.   Need correct significant hypokalemia prior to any surgical intervention but await surgeon's opinion about timing. Imaging study pending and hopefully adequate for surgical team.  Cultures of blood and urine pending no growth to date. Leukocytosis improved. Order PICC line regarding multiple reasons including likely prolonged IV antibiotics, possible prolonged n.p.o. with moderate to severe malnutrition, await nutrition consult for formal diagnosis of status of malnutrition as able for documentation. May need TPN. He can eat but may need to remain n.p.o. and very poor appetite. Unclear timing of any surgical intervention. Cannot diagnose septic shock into appropriate continue IV fluids.     2. Acute kidney injuryIV hydration, Stern catheter--significant improvement last 12 hourscontinue IV hydration Stern catheter and follow.     3. Hyponatremia hypokalemia. Serum sodium significantly improved with IV lactated Ringer's bolus for sepsis. Needs maintenance IV. Serum potassium improved from 2.2 now 2.7. Will need continued IV supplementation. Serum magnesium was low but IV bolus administered. Goal keep magnesium greater than 2 potassium 4 as able.     4. Tophaceous goutcontinue home meds with pharmacy adjustment for renal function --serum urate 11 monitor with improved renal function and continued IV hydrationno obvious acute gouty arthritis.     5.  Likely malnutrition nutrition consult for documentation of severity for documentation purposes. [address] Ext line as above. Could start TPN as soon as today if nutrition team agrees.     6.  Daily alcoholIV thiamine daily and Ativan PRN for now. If clinically worsens with agitation consider ciwa protocallthis is unchanged. 7.  Peripheral vascular disease continue home Plavixhave held Pletal for now. 8.  History of oxygen dependent COPDcontinue PRN albuterol and oxygen as needed. 9.  History of hypertension--Home Lasix held.   Recent start empirically due to trial with unexplained dyspnea with normal recent LVEF. Continue diltiazemno history of SVT or A. fib RVR is available and EKG sinus tach on admissionif remains hypotensive will need to hold diltiazem also. Overall prognosis appears to be guarded and call placed to birdie Hassan [phone]bmU oldest child is Jacky Carter [phone]     Family requesting Daily update     Discharge planning: Skilled nursing facility  DVT ppx: Lovenox     Code status: Full code  Decision Maker: Selfoldest child is Jacky Carter [phone]      Signed:  Briseyda DANIELSON

## 2020-07-22 NOTE — PROGRESS NOTES
CM contacted patient's daughter Sasha Blankenship [phone] this day, to complete assessment. Demographic information confirmed by the patient's daughter. The patient lives in a one story home with an estimation of 8 steps at the entrance. Patient's grandson Nilsa [phone] also lives with the patient. The patient is not independent with completing ADL's. The patient's family appears to be supportive with care needs. Daughter states the patient has not been eating within the last two weeks, due to a decrease in appetite. Patient's daughter also informed CM, the patient recently fell on 7/18/2020. Patient's oxygen is supplied by [employer]. The patient typically uses 2 liters at night. The patient obtains his medications from [facility] on old [address] in [place]. Daughter voiced no difficulty with obtaining medications in the community. Discharge planning: PT/OT have not been consulted. PPD has not been placed. Daughter feels the patient could benefit from STR. The patient recently was assisted by [facility] services, prior to being admitted. Daughter is unable to provide name of [facility] agency. CM will continue to follow the patient during this hospitalization, to assist further with discharge planning. Care Management Interventions  PCP Verified by CM: Yes  Mode of Transport at Discharge: Other (see comment)(TBD: Based upon need. )  Transition of Care Consult (CM Consult): Discharge Planning  Discharge Durable Medical Equipment: No(Per daughter Sasha Blankenship, the patient has oxygen, shower chair, transfer bench, walker, and rolling walker. )  Physical Therapy Consult: No  Occupational Therapy Consult: No  Speech Therapy Consult: No  Current Support Network: Own Home(Patient's grandson lives with the patient. )  Confirm Follow Up Transport: Family  The Plan for Transition of Care is Related to the Following Treatment Goals : Patient to obtain care to become medically stable.    Name of the Patient Representative Who was Provided with a Choice of Provider and Agrees with the Discharge Plan: Patient's son Margarito Urena Provided?: No  Discharge Location  Discharge Placement: Unable to determine at this time

## 2020-07-22 NOTE — ANESTHESIA PREPROCEDURE EVALUATION
Relevant Problems   No relevant active problems       Anesthetic History               Review of Systems / Medical History  Patient summary reviewed and pertinent labs reviewed    Pulmonary    COPD: moderate               Neuro/Psych              Cardiovascular    Hypertension: well controlled          PAD      Comments: Echo 6/2020 - normal EF function   GI/Hepatic/Renal         Renal disease: CRI      Comments: EtOH abuse Endo/Other        Anemia (Hb 9.0)     Other Findings              Physical Exam    Airway  Mallampati: II  TM Distance: 4 - 6 cm  Neck ROM: normal range of motion   Mouth opening: Normal     Cardiovascular               Dental    Dentition: Poor dentition     Pulmonary                 Abdominal         Other Findings            Anesthetic Plan    ASA: 4  Anesthesia type: general          Induction: Intravenous  Anesthetic plan and risks discussed with: Patient and Spouse

## 2020-07-22 NOTE — PROGRESS NOTES
PICC Placement Note    PRE-PROCEDURE VERIFICATION  Correct Procedure: yes. Time out completed with assistant Herman Goodell RN VAT and all persons present in agreement with time out. Correct Site:  yes  Temperature: Temp: 98.2 °F (36.8 °C), Temperature Source: Temp Source: Oral  Recent Labs     07/22/20  0520 07/21/20 2015   BUN 44*  --    CREA 1.33  --      --    INR  --  1.0   WBC 11.1  --      Allergies: Aspirin and Prednisone  Education materials for [facility] given to patient or family. PROCEDURE DETAIL  A double lumen PICC line was started for TPN. The following documentation is in addition to the PICC properties in the lines/airways flowsheet :  Lot #: [id number]  xylocaine used: yes  Mid-Arm Circumference: 24 (cm)  Internal Catheter Length: 41 (cm)  Internal Catheter Total Length: 41 (cm)  Vein Selection for PICC:right basilic  Central Line Bundle followed yes  Complication Related to Insertion: excessive bleeding at site, surgicell applied with pressure dressing  Both the insertion guidewire and ECG guidewire were removed intact all ports have positive blood return and were flush well with normal saline. The location of the tip of the PICC is verified using ECG technology. The tip is in the SVC per ECG reading. See image below.      Line is okay to use: yes    All PIVs removed per protocol    Librado Kaminski RN VAT

## 2020-07-22 NOTE — WOUND CARE
Patient seen with Dr Zeny Day and surgical team for sacral wound. It is present on admission, necrotic, foul smelling stage 4 wound. Dakin's to be started, ordered at this time. Wound care bed brought down for patient use and will be switched out when he goes for his procedure this am. Recommend dietitian to assist with poor nutritional status. May be a patient appropriate for jordy use? He stated he can drink liquids better than solid foods. He agreed to keep turned on his sides and off his bottom. He stated he normally favors his right side. Discussed pressure injury, pressure relief and nutrition for healing. Wound team will assist and monitor. Updated primary nurse.

## 2020-07-22 NOTE — CONSULTS
H&P/Consult Note/Progress Note/Office Note:   Catrina Cross  MRN: [id number]  :1935  Age:85 y.o.    HPI: Catrina Cross is a 81 y.o. male who we are asked by Dr. Milagro Richter to see for sacral wound. The patient has a hx of COPD, PVD, gout, CKD, and ETOH abuse. He is currently admitted after he presented with hyponatremia. He had a known large, malodorous sacral decubitus ulcer with leukocytosis. We are consulted for debridement and possible fistula. CTAP ordered  is pending result.       Past Medical History:   Diagnosis Date    Atherosclerosis of native arteries of the extremities with intermittent claudication 2014    Chronic kidney disease     elevated labs see's dr Krupa Almazan ulcer ([facility] 2017    Former cigarette smoker 2014    Gout     HTN (hypertension) 2014    Hypercholesterolemia     Hyperlipidemia 2014    Peripheral vascular disease ([facility]      Past Surgical History:   Procedure Laterality Date    HX COLONOSCOPY      HX HEMORRHOIDECTOMY      HX MALIGNANT SKIN LESION EXCISION       Current Facility-Administered Medications   Medication Dose Route Frequency    potassium chloride (K-DUR, KLOR-CON) SR tablet 40 mEq  40 mEq Oral Q4H    0.9% sodium chloride infusion  125 mL/hr IntraVENous CONTINUOUS    potassium chloride 20 mEq in 100 ml IVPB  20 mEq IntraVENous Q2H    sodium chloride 0.9 % bolus infusion 100 mL  100 mL IntraVENous RAD ONCE    iopamidoL (ISOVUE-370) 76 % injection 100 mL  100 mL IntraVENous RAD ONCE    saline peripheral flush soln 10 mL  10 mL InterCATHeter RAD ONCE    vancomycin (VANCOCIN) 1,000 mg in 0.9% sodium chloride (MBP/ADV) 250 mL  1,000 mg IntraVENous Q24H    glycopyrrolate-formoterol (BEVESPI AEROSPHERE) 9 mcg-4.8 mcg inhaler  2 Puff Inhalation BID RT    sodium chloride (NS) flush 5-40 mL  5-40 mL IntraVENous Q8H    sodium chloride (NS) flush 5-40 mL  5-40 mL IntraVENous PRN    clindamycin (CLEOCIN) 600mg D5W 50mL IVPB (premix)  600 mg IntraVENous Q8H    enoxaparin (LOVENOX) injection 40 mg  40 mg SubCUTAneous Q24H    [Held by provider] clopidogreL (PLAVIX) tablet 75 mg  75 mg Oral DAILY    albuterol (PROVENTIL VENTOLIN) nebulizer solution 2.5 mg  2.5 mg Nebulization Q4H PRN    [Held by provider] dilTIAZem ER (CARDIZEM CD) capsule 120 mg  120 mg Oral DAILY    probenecid-colchicine (ColBenemid) tablet 1 Tab (Patient Supplied)  1 Tab Oral DAILY    piperacillin-tazobactam (ZOSYN) 3.375 g in 0.9% sodium chloride (MBP/ADV) 100 mL  3.375 g IntraVENous Q8H    thiamine (B-1) 100 mg in 0.9% sodium chloride 50 mL IVPB  100 mg IntraVENous DAILY    LORazepam (ATIVAN) tablet 1 mg  1 mg Oral Q6H PRN    morphine injection 2 mg  2 mg IntraVENous Q4H PRN    acetaminophen (TYLENOL) tablet 650 mg  650 mg Oral Q4H PRN     Aspirin and Prednisone  Social History     Socioeconomic History    Marital status:      Spouse name: Not on file    Number of children: Not on file    Years of education: Not on file    Highest education level: Not on file   Tobacco Use    Smoking status: Former Smoker     Packs/day: 2.00     Years: 25.00     Pack years: 50.00     Types: Cigarettes    Smokeless tobacco: Former User   Substance and Sexual Activity    Alcohol use: Yes     Alcohol/week: 6.7 standard drinks     Types: 8 Shots of liquor per week     Social History     Tobacco Use   Smoking Status Former Smoker    Packs/day: 2.00    Years: 25.00    Pack years: 50.00    Types: Cigarettes   Smokeless Tobacco Former User     Family History   Problem Relation Age of Onset    Hypertension Mother     Breast Cancer Mother      ROS: The patient has no difficulty with chest pain or shortness of breath. No fever or chills. Comprehensive review of systems was otherwise unremarkable except as noted above.     Physical Exam:   Visit Vitals  BP 91/51 (BP 1 Location: Left arm, BP Patient Position: At rest)   Pulse (!) 101   Temp 98.1 °F (36.7 °C)   Resp 18   Ht 5' 8\" (1.727 m)   Wt 180 lb (81.6 kg)   SpO2 94%   BMI 27.37 kg/m²     Constitutional: Alert, oriented, cooperative patient in no acute distress; appears stated age    Eyes:Sclera are clear. EOMs intact  ENMT: no external lesions gross hearing normal; no obvious neck masses, no ear or lip lesions, nares normal  CV: RRR. Normal perfusion  Resp: No JVD. Breathing is  non-labored; no audible wheezing. GI: soft and non-distended  Integument: unstageable sacral decubitus with brown malodorous drainage     Musculoskeletal: unremarkable with normal function. No embolic signs or cyanosis.    Neuro:  Oriented; moves all 4; no focal deficits  Psychiatric: normal affect and mood, no memory impairment    Recent vitals (if inpt):  Patient Vitals for the past 24 hrs:   BP Temp Pulse Resp SpO2 Height Weight   07/22/20 0801 91/51 98.1 °F (36.7 °C) (!) 101 18 94 %     07/22/20 0416 (!) 87/53 98.9 °F (37.2 °C) 91 18 98 %     07/21/20 1959     96 %     07/21/20 1928 107/57 98.5 °F (36.9 °C) (!) 102 20 97 %     07/21/20 1827   93 24 100 %     07/21/20 1816  97.7 °F (36.5 °C)  18      07/21/20 1800 109/53  (!) 101 22 99 %     07/21/20 1745   (!) 103 22 98 %     07/21/20 1731 101/63         07/21/20 1601 103/53  98 28 98 %     07/21/20 1526 119/60 98.2 °F (36.8 °C) (!) 105 17 97 % 5' 8\" (1.727 m) 180 lb (81.6 kg)       Labs:  Recent Labs     07/22/20  0520 07/21/20 2015   WBC 11.1  --    HGB 9.0*  --      --      --    K 2.7*  --      --    CO2 23  --    BUN 44*  --    CREA 1.33  --    GLU 95  --    PTP  --  13.8   INR  --  1.0   APTT  --  35.0   TBILI 0.3  --    ALT 20  --      --        Lab Results   Component Value Date/Time    WBC 11.1 07/22/2020 05:20 AM    HGB 9.0 (L) 07/22/2020 05:20 AM    PLATELET 673 23/27/7260 05:20 AM    Sodium 138 07/22/2020 05:20 AM    Potassium 2.7 (LL) 07/22/2020 05:20 AM    Chloride 105 07/22/2020 05:20 AM    CO2 23 07/22/2020 05:20 AM    BUN 44 (H) 07/22/2020 05:20 AM    Creatinine 1.33 07/22/2020 05:20 AM    Glucose 95 07/22/2020 05:20 AM    INR 1.0 07/21/2020 08:15 PM    aPTT 35.0 07/21/2020 08:15 PM    Bilirubin, total 0.3 07/22/2020 05:20 AM    ALT (SGPT) 20 07/22/2020 05:20 AM    Alk. phosphatase 127 07/22/2020 05:20 AM    Amylase 92 09/13/2019 09:30 AM    Lipase 54 11/18/2019 08:48 AM       I reviewed recent labs and recent radiologic studies. I independently reviewed radiology images for studies I described above or studies I have ordered.    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  ICD-9-CM: 565.1  7/21/2020        Hyponatremia ICD-10-CM: E87.1  ICD-9-CM: 276.1  7/21/2020        Hypokalemia ICD-10-CM: E87.6  ICD-9-CM: 276.8  7/21/2020        Acute kidney injury (MARY) with acute tubular necrosis (ATN) ([facility] ICD-10-CM: N17.0  ICD-9-CM: 584.5  7/21/2020        Chronic hypoxemic respiratory failure ([facility] ICD-10-CM: J96.11  ICD-9-CM: 518.83, 799.02  7/21/2020        Normocytic anemia ICD-10-CM: D64.9  ICD-9-CM: 285.9  7/21/2020        Moderate protein malnutrition ([facility] ICD-10-CM: E44.0  ICD-9-CM: 263.0  7/21/2020        Chronic obstructive pulmonary disease ([facility] ICD-10-CM: J44.9  ICD-9-CM: 382  6/22/2020        CRAIG (dyspnea on exertion) ICD-10-CM: R06.00  ICD-9-CM: 786.09  6/22/2020        Edema ICD-10-CM: R60.9  ICD-9-CM: 782.3  6/22/2020        Orthopnea ICD-10-CM: R06.01  ICD-9-CM: 786.02  6/22/2020        Cigarette nicotine dependence in remission ICD-10-CM: F17.211  ICD-9-CM: V15.82  6/22/2020        Constipation by delayed colonic transit ICD-10-CM: K59.01  ICD-9-CM: 564.01  2/2/2020        Grade I hemorrhoids ICD-10-CM: K64.0  ICD-9-CM: 455.0  8/10/2019        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  ICD-9-CM: 274.03  5/18/2018        Gout ICD-10-CM: M10.9  ICD-9-CM: 274.9  Unknown        Peripheral vascular disease (HCC) ICD-10-CM: I73.9  ICD-9-CM: 443.9  Unknown        Atherosclerosis of native artery of extremity with intermittent claudication (HCC) (Chronic) ICD-10-CM: C86.721  ICD-9-CM: 440.21  5/13/2014        Former cigarette smoker ICD-10-CM: V30.150  ICD-9-CM: V15.82  5/13/2014        HTN (hypertension) (Chronic) ICD-10-CM: I10  ICD-9-CM: 401.9  5/13/2014        Hyperlipidemia (Chronic) ICD-10-CM: E78.5  ICD-9-CM: 272.4  5/13/2014            Principal Problem:    Severe sepsis ([facility] (7/21/2020)    Active Problems:    Atherosclerosis of native artery of extremity with intermittent claudication ([facility] (5/13/2014)      Former cigarette smoker (5/13/2014)      HTN (hypertension) (5/13/2014)      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)      Chronic obstructive pulmonary disease ([facility] (6/22/2020)      Pressure injury of deep tissue of sacral region (7/21/2020)      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 Kayleen Mata on 7/23    Discussed the patient's condition and treatment options with the patient. Discussed risks of surgery in language the patient could understand. The patient voiced understanding of all this and all questions were answered.   Alternatives to surgery were discussed also and risks of the alternatives. The patient requested that we proceed with surgery. Informed consent was obtained. Signed:  Poncho Clemente NP     I have seen and examined the patient with the Nurse Practitioner and agree with the above assessment and plan. Sacral wound, clearly infected and contaminated with stool. Not sure it's fistula.  Will need debridement first.     Jackelyn Vallecillo MD

## 2020-07-23 ENCOUNTER — ANESTHESIA (OUTPATIENT)
Dept: SURGERY | Age: 85
DRG: 853 | End: 2020-07-23
Payer: MEDICARE

## 2020-07-23 LAB
ALBUMIN SERPL-MCNC: 1.1 G/DL (ref 3.2–4.6)
ALBUMIN/GLOB SERPL: 0.2 {RATIO} (ref 1.2–3.5)
ALP SERPL-CCNC: 131 U/L (ref 50–136)
ALT SERPL-CCNC: 39 U/L (ref 12–65)
ANION GAP SERPL CALC-SCNC: 9 MMOL/L (ref 7–16)
AST SERPL-CCNC: 95 U/L (ref 15–37)
BASOPHILS # BLD: 0.1 K/UL (ref 0–0.2)
BASOPHILS NFR BLD: 1 % (ref 0–2)
BILIRUB SERPL-MCNC: 0.2 MG/DL (ref 0.2–1.1)
BUN SERPL-MCNC: 25 MG/DL (ref 8–23)
CALCIUM SERPL-MCNC: 8.4 MG/DL (ref 8.3–10.4)
CHLORIDE SERPL-SCNC: 112 MMOL/L (ref 98–107)
CO2 SERPL-SCNC: 21 MMOL/L (ref 21–32)
CREAT SERPL-MCNC: 0.93 MG/DL (ref 0.8–1.5)
DIFFERENTIAL METHOD BLD: ABNORMAL
EOSINOPHIL # BLD: 0.2 K/UL (ref 0–0.8)
EOSINOPHIL NFR BLD: 2 % (ref 0.5–7.8)
ERYTHROCYTE [DISTWIDTH] IN BLOOD BY AUTOMATED COUNT: 13.8 % (ref 11.9–14.6)
GLOBULIN SER CALC-MCNC: 5 G/DL (ref 2.3–3.5)
GLUCOSE SERPL-MCNC: 119 MG/DL (ref 65–100)
HCT VFR BLD AUTO: 29.3 % (ref 41.1–50.3)
HGB BLD-MCNC: 10.1 G/DL (ref 13.6–17.2)
IMM GRANULOCYTES # BLD AUTO: 0.5 K/UL (ref 0–0.5)
IMM GRANULOCYTES NFR BLD AUTO: 5 % (ref 0–5)
LYMPHOCYTES # BLD: 1.2 K/UL (ref 0.5–4.6)
LYMPHOCYTES NFR BLD: 12 % (ref 13–44)
MAGNESIUM SERPL-MCNC: 2.2 MG/DL (ref 1.8–2.4)
MCH RBC QN AUTO: 32.5 PG (ref 26.1–32.9)
MCHC RBC AUTO-ENTMCNC: 34.5 G/DL (ref 31.4–35)
MCV RBC AUTO: 94.2 FL (ref 79.6–97.8)
MONOCYTES # BLD: 1 K/UL (ref 0.1–1.3)
MONOCYTES NFR BLD: 10 % (ref 4–12)
NEUTS SEG # BLD: 7.3 K/UL (ref 1.7–8.2)
NEUTS SEG NFR BLD: 71 % (ref 43–78)
NRBC # BLD: 0 K/UL (ref 0–0.2)
PHOSPHATE SERPL-MCNC: 1.7 MG/DL (ref 2.3–3.7)
PLATELET # BLD AUTO: 348 K/UL (ref 150–450)
PMV BLD AUTO: 8.6 FL (ref 9.4–12.3)
POTASSIUM SERPL-SCNC: 3.8 MMOL/L (ref 3.5–5.1)
PROT SERPL-MCNC: 6.1 G/DL (ref 6.3–8.2)
RBC # BLD AUTO: 3.11 M/UL (ref 4.23–5.6)
SODIUM SERPL-SCNC: 142 MMOL/L (ref 136–145)
TRIGL SERPL-MCNC: 146 MG/DL (ref 35–150)
WBC # BLD AUTO: 10.3 K/UL (ref 4.3–11.1)

## 2020-07-23 PROCEDURE — 97530 THERAPEUTIC ACTIVITIES: CPT | Performed by: PHYSICAL THERAPIST

## 2020-07-23 PROCEDURE — 86580 TB INTRADERMAL TEST: CPT | Performed by: INTERNAL MEDICINE

## 2020-07-23 PROCEDURE — 97161 PT EVAL LOW COMPLEX 20 MIN: CPT | Performed by: PHYSICAL THERAPIST

## 2020-07-23 PROCEDURE — 76210000006 HC OR PH I REC 0.5 TO 1 HR: Performed by: SURGERY

## 2020-07-23 PROCEDURE — 88305 TISSUE EXAM BY PATHOLOGIST: CPT

## 2020-07-23 PROCEDURE — 74011250636 HC RX REV CODE- 250/636: Performed by: INTERNAL MEDICINE

## 2020-07-23 PROCEDURE — 77030039425 HC BLD LARYNG TRULITE DISP TELE -A: Performed by: ANESTHESIOLOGY

## 2020-07-23 PROCEDURE — 88311 DECALCIFY TISSUE: CPT

## 2020-07-23 PROCEDURE — 74011000250 HC RX REV CODE- 250: Performed by: NURSE ANESTHETIST, CERTIFIED REGISTERED

## 2020-07-23 PROCEDURE — 83735 ASSAY OF MAGNESIUM: CPT

## 2020-07-23 PROCEDURE — 77030037088 HC TUBE ENDOTRACH ORAL NSL COVD-A: Performed by: ANESTHESIOLOGY

## 2020-07-23 PROCEDURE — 74011250636 HC RX REV CODE- 250/636: Performed by: NURSE ANESTHETIST, CERTIFIED REGISTERED

## 2020-07-23 PROCEDURE — 77030040922 HC BLNKT HYPOTHRM STRY -A: Performed by: ANESTHESIOLOGY

## 2020-07-23 PROCEDURE — 85025 COMPLETE CBC W/AUTO DIFF WBC: CPT

## 2020-07-23 PROCEDURE — 65270000029 HC RM PRIVATE

## 2020-07-23 PROCEDURE — 94640 AIRWAY INHALATION TREATMENT: CPT

## 2020-07-23 PROCEDURE — 76060000033 HC ANESTHESIA 1 TO 1.5 HR: Performed by: SURGERY

## 2020-07-23 PROCEDURE — 77030018836 HC SOL IRR NACL ICUM -A: Performed by: SURGERY

## 2020-07-23 PROCEDURE — 77030011264 HC ELECTRD BLD EXT COVD -A: Performed by: SURGERY

## 2020-07-23 PROCEDURE — 0KBP0ZZ EXCISION OF LEFT HIP MUSCLE, OPEN APPROACH: ICD-10-PCS | Performed by: SURGERY

## 2020-07-23 PROCEDURE — 80053 COMPREHEN METABOLIC PANEL: CPT

## 2020-07-23 PROCEDURE — 74011250636 HC RX REV CODE- 250/636: Performed by: ANESTHESIOLOGY

## 2020-07-23 PROCEDURE — 77030040361 HC SLV COMPR DVT MDII -B: Performed by: SURGERY

## 2020-07-23 PROCEDURE — 77030021678 HC GLIDESCP STAT DISP VERT -B: Performed by: ANESTHESIOLOGY

## 2020-07-23 PROCEDURE — 74011000250 HC RX REV CODE- 250: Performed by: INTERNAL MEDICINE

## 2020-07-23 PROCEDURE — 77030019908 HC STETH ESOPH SIMS -A: Performed by: ANESTHESIOLOGY

## 2020-07-23 PROCEDURE — 84478 ASSAY OF TRIGLYCERIDES: CPT

## 2020-07-23 PROCEDURE — 84100 ASSAY OF PHOSPHORUS: CPT

## 2020-07-23 PROCEDURE — 76010000149 HC OR TIME 1 TO 1.5 HR: Performed by: SURGERY

## 2020-07-23 PROCEDURE — 65660000000 HC RM CCU STEPDOWN

## 2020-07-23 PROCEDURE — 0KBN0ZZ EXCISION OF RIGHT HIP MUSCLE, OPEN APPROACH: ICD-10-PCS | Performed by: SURGERY

## 2020-07-23 PROCEDURE — 74011250637 HC RX REV CODE- 250/637: Performed by: INTERNAL MEDICINE

## 2020-07-23 PROCEDURE — 74011000302 HC RX REV CODE- 302: Performed by: INTERNAL MEDICINE

## 2020-07-23 PROCEDURE — 74011000258 HC RX REV CODE- 258: Performed by: INTERNAL MEDICINE

## 2020-07-23 RX ORDER — SODIUM CHLORIDE 0.9 % (FLUSH) 0.9 %
5-40 SYRINGE (ML) INJECTION AS NEEDED
Status: DISCONTINUED | OUTPATIENT
Start: 2020-07-23 | End: 2020-07-23 | Stop reason: HOSPADM

## 2020-07-23 RX ORDER — SODIUM CHLORIDE 0.9 % (FLUSH) 0.9 %
5-40 SYRINGE (ML) INJECTION EVERY 8 HOURS
Status: DISCONTINUED | OUTPATIENT
Start: 2020-07-23 | End: 2020-07-23 | Stop reason: HOSPADM

## 2020-07-23 RX ORDER — LIDOCAINE HYDROCHLORIDE 20 MG/ML
INJECTION, SOLUTION EPIDURAL; INFILTRATION; INTRACAUDAL; PERINEURAL AS NEEDED
Status: DISCONTINUED | OUTPATIENT
Start: 2020-07-23 | End: 2020-07-23 | Stop reason: HOSPADM

## 2020-07-23 RX ORDER — GLYCOPYRROLATE 0.2 MG/ML
INJECTION INTRAMUSCULAR; INTRAVENOUS AS NEEDED
Status: DISCONTINUED | OUTPATIENT
Start: 2020-07-23 | End: 2020-07-23 | Stop reason: HOSPADM

## 2020-07-23 RX ORDER — ONDANSETRON 2 MG/ML
INJECTION INTRAMUSCULAR; INTRAVENOUS AS NEEDED
Status: DISCONTINUED | OUTPATIENT
Start: 2020-07-23 | End: 2020-07-23 | Stop reason: HOSPADM

## 2020-07-23 RX ORDER — DEXAMETHASONE SODIUM PHOSPHATE 4 MG/ML
INJECTION, SOLUTION INTRA-ARTICULAR; INTRALESIONAL; INTRAMUSCULAR; INTRAVENOUS; SOFT TISSUE AS NEEDED
Status: DISCONTINUED | OUTPATIENT
Start: 2020-07-23 | End: 2020-07-23 | Stop reason: HOSPADM

## 2020-07-23 RX ORDER — OXYCODONE AND ACETAMINOPHEN 10; 325 MG/1; MG/1
1 TABLET ORAL AS NEEDED
Status: DISCONTINUED | OUTPATIENT
Start: 2020-07-23 | End: 2020-07-23 | Stop reason: HOSPADM

## 2020-07-23 RX ORDER — HYDROMORPHONE HYDROCHLORIDE 2 MG/ML
0.5 INJECTION, SOLUTION INTRAMUSCULAR; INTRAVENOUS; SUBCUTANEOUS
Status: DISCONTINUED | OUTPATIENT
Start: 2020-07-23 | End: 2020-07-23 | Stop reason: HOSPADM

## 2020-07-23 RX ORDER — PROPOFOL 10 MG/ML
INJECTION, EMULSION INTRAVENOUS AS NEEDED
Status: DISCONTINUED | OUTPATIENT
Start: 2020-07-23 | End: 2020-07-23 | Stop reason: HOSPADM

## 2020-07-23 RX ORDER — SODIUM CHLORIDE, SODIUM LACTATE, POTASSIUM CHLORIDE, CALCIUM CHLORIDE 600; 310; 30; 20 MG/100ML; MG/100ML; MG/100ML; MG/100ML
75 INJECTION, SOLUTION INTRAVENOUS CONTINUOUS
Status: DISCONTINUED | OUTPATIENT
Start: 2020-07-23 | End: 2020-07-23 | Stop reason: HOSPADM

## 2020-07-23 RX ORDER — FENTANYL CITRATE 50 UG/ML
INJECTION, SOLUTION INTRAMUSCULAR; INTRAVENOUS AS NEEDED
Status: DISCONTINUED | OUTPATIENT
Start: 2020-07-23 | End: 2020-07-23 | Stop reason: HOSPADM

## 2020-07-23 RX ORDER — ROCURONIUM BROMIDE 10 MG/ML
INJECTION, SOLUTION INTRAVENOUS AS NEEDED
Status: DISCONTINUED | OUTPATIENT
Start: 2020-07-23 | End: 2020-07-23 | Stop reason: HOSPADM

## 2020-07-23 RX ORDER — OXYCODONE HYDROCHLORIDE 5 MG/1
5 TABLET ORAL
Status: DISCONTINUED | OUTPATIENT
Start: 2020-07-23 | End: 2020-07-23 | Stop reason: HOSPADM

## 2020-07-23 RX ORDER — NEOSTIGMINE METHYLSULFATE 1 MG/ML
INJECTION, SOLUTION INTRAVENOUS AS NEEDED
Status: DISCONTINUED | OUTPATIENT
Start: 2020-07-23 | End: 2020-07-23 | Stop reason: HOSPADM

## 2020-07-23 RX ADMIN — CLINDAMYCIN IN 5 PERCENT DEXTROSE 600 MG: 12 INJECTION, SOLUTION INTRAVENOUS at 04:45

## 2020-07-23 RX ADMIN — PHENYLEPHRINE HYDROCHLORIDE 240 MCG: 10 INJECTION INTRAVENOUS at 17:40

## 2020-07-23 RX ADMIN — TUBERCULIN PURIFIED PROTEIN DERIVATIVE 5 UNITS: 5 INJECTION, SOLUTION INTRADERMAL at 12:23

## 2020-07-23 RX ADMIN — PHENYLEPHRINE HYDROCHLORIDE 240 MCG: 10 INJECTION INTRAVENOUS at 17:27

## 2020-07-23 RX ADMIN — CLINDAMYCIN IN 5 PERCENT DEXTROSE 600 MG: 12 INJECTION, SOLUTION INTRAVENOUS at 12:11

## 2020-07-23 RX ADMIN — PIPERACILLIN AND TAZOBACTAM 3.38 G: 3; .375 INJECTION, POWDER, FOR SOLUTION INTRAVENOUS at 16:44

## 2020-07-23 RX ADMIN — HYDROMORPHONE HYDROCHLORIDE 0.5 MG: 2 INJECTION INTRAMUSCULAR; INTRAVENOUS; SUBCUTANEOUS at 18:31

## 2020-07-23 RX ADMIN — Medication 10 ML: at 04:45

## 2020-07-23 RX ADMIN — I.V. FAT EMULSION 250 ML: 20 EMULSION INTRAVENOUS at 20:12

## 2020-07-23 RX ADMIN — PHENYLEPHRINE HYDROCHLORIDE 240 MCG: 10 INJECTION INTRAVENOUS at 17:16

## 2020-07-23 RX ADMIN — PIPERACILLIN AND TAZOBACTAM 3.38 G: 3; .375 INJECTION, POWDER, FOR SOLUTION INTRAVENOUS at 08:49

## 2020-07-23 RX ADMIN — POTASSIUM CHLORIDE: 2 INJECTION, SOLUTION, CONCENTRATE INTRAVENOUS at 20:12

## 2020-07-23 RX ADMIN — SODIUM CHLORIDE: 9 INJECTION, SOLUTION INTRAVENOUS at 16:58

## 2020-07-23 RX ADMIN — THIAMINE HYDROCHLORIDE 100 MG: 100 INJECTION, SOLUTION INTRAMUSCULAR; INTRAVENOUS at 08:53

## 2020-07-23 RX ADMIN — ONDANSETRON 4 MG: 2 INJECTION INTRAMUSCULAR; INTRAVENOUS at 17:42

## 2020-07-23 RX ADMIN — PHENYLEPHRINE HYDROCHLORIDE 240 MCG: 10 INJECTION INTRAVENOUS at 17:17

## 2020-07-23 RX ADMIN — PHENYLEPHRINE HYDROCHLORIDE: 10 INJECTION INTRAVENOUS at 17:05

## 2020-07-23 RX ADMIN — PIPERACILLIN AND TAZOBACTAM 3.38 G: 3; .375 INJECTION, POWDER, FOR SOLUTION INTRAVENOUS at 00:27

## 2020-07-23 RX ADMIN — FENTANYL CITRATE 50 MCG: 50 INJECTION INTRAMUSCULAR; INTRAVENOUS at 17:06

## 2020-07-23 RX ADMIN — DEXAMETHASONE SODIUM PHOSPHATE 4 MG: 4 INJECTION, SOLUTION INTRAMUSCULAR; INTRAVENOUS at 17:42

## 2020-07-23 RX ADMIN — ROCURONIUM BROMIDE 35 MG: 10 INJECTION, SOLUTION INTRAVENOUS at 17:05

## 2020-07-23 RX ADMIN — GLYCOPYRROLATE 0.4 MG: 0.2 INJECTION, SOLUTION INTRAMUSCULAR; INTRAVENOUS at 17:59

## 2020-07-23 RX ADMIN — SODIUM CHLORIDE 83 ML/HR: 9 INJECTION, SOLUTION INTRAVENOUS at 20:12

## 2020-07-23 RX ADMIN — VANCOMYCIN HYDROCHLORIDE 1000 MG: 1 INJECTION, POWDER, LYOPHILIZED, FOR SOLUTION INTRAVENOUS at 22:21

## 2020-07-23 RX ADMIN — PROPOFOL 120 MG: 10 INJECTION, EMULSION INTRAVENOUS at 17:05

## 2020-07-23 RX ADMIN — Medication 20 ML: at 22:19

## 2020-07-23 RX ADMIN — ENOXAPARIN SODIUM 40 MG: 40 INJECTION SUBCUTANEOUS at 20:21

## 2020-07-23 RX ADMIN — FENTANYL CITRATE 25 MCG: 50 INJECTION INTRAMUSCULAR; INTRAVENOUS at 18:00

## 2020-07-23 RX ADMIN — CLINDAMYCIN IN 5 PERCENT DEXTROSE 600 MG: 12 INJECTION, SOLUTION INTRAVENOUS at 20:11

## 2020-07-23 RX ADMIN — Medication 3 MG: at 17:59

## 2020-07-23 RX ADMIN — GLYCOPYRROLATE AND FORMOTEROL FUMARATE 2 PUFF: 9; 4.8 AEROSOL, METERED RESPIRATORY (INHALATION) at 08:28

## 2020-07-23 RX ADMIN — Medication 10 ML: at 22:27

## 2020-07-23 RX ADMIN — LIDOCAINE HYDROCHLORIDE 60 MG: 20 INJECTION, SOLUTION EPIDURAL; INFILTRATION; INTRACAUDAL; PERINEURAL at 17:05

## 2020-07-23 RX ADMIN — FENTANYL CITRATE 25 MCG: 50 INJECTION INTRAMUSCULAR; INTRAVENOUS at 17:55

## 2020-07-23 RX ADMIN — SODIUM PHOSPHATE, MONOBASIC, MONOHYDRATE: 276; 142 INJECTION, SOLUTION INTRAVENOUS at 12:12

## 2020-07-23 RX ADMIN — HYDROMORPHONE HYDROCHLORIDE 0.5 MG: 2 INJECTION INTRAMUSCULAR; INTRAVENOUS; SUBCUTANEOUS at 18:19

## 2020-07-23 RX ADMIN — PROBENECID AND COLCHICINE 1 TABLET: 500; .5 TABLET ORAL at 13:55

## 2020-07-23 NOTE — ANESTHESIA POSTPROCEDURE EVALUATION
Procedure(s):  SACRAL WOUND DEBRIDEMENT. general    Anesthesia Post Evaluation      Multimodal analgesia: multimodal analgesia used between 6 hours prior to anesthesia start to PACU discharge  Patient location during evaluation: PACU  Patient participation: complete - patient participated  Level of consciousness: awake  Pain management: adequate  Airway patency: patent  Anesthetic complications: no  Cardiovascular status: acceptable and hemodynamically stable  Respiratory status: acceptable  Hydration status: acceptable  Comments: Acceptable for discharge from PACU. Post anesthesia nausea and vomiting:  none  Final Post Anesthesia Temperature Assessment:  Normothermia (36.0-37.5 degrees C)      INITIAL Post-op Vital signs:   Vitals Value Taken Time   /60 7/23/2020  6:31 PM   Temp 36.7 °C (98 °F) 7/23/2020  6:17 PM   Pulse 102 7/23/2020  6:33 PM   Resp 34 7/23/2020  6:33 PM   SpO2 95 % 7/23/2020  6:33 PM   Vitals shown include unvalidated device data.

## 2020-07-23 NOTE — PROGRESS NOTES
Problem: Mobility Impaired (Adult and Pediatric)  Goal: *Acute Goals and Plan of Care (Insert Text)  Outcome: Progressing Towards Goal  Note: LTG:  (1.)Mr. Katarzyna Whitney will move from supine to sit and sit to supine , scoot up and down, and roll side to side in bed with STAND BY ASSIST within 7 treatment day(s). (2.)Mr. Katarzyna Whitney will transfer from bed to chair and chair to bed with CONTACT GUARD ASSIST using the least restrictive device within 7 treatment day(s). (3.)Mr. Katarzyna Whitney will ambulate with CONTACT GUARD ASSIST for 40 feet with the least restrictive device within 7 treatment day(s). (4.)Mr. Katarzyna Whitney will tolerate at least 8 min of dynamic standing activity to assist standing ADLs with the least restrictive device within 7 treatment days. PHYSICAL THERAPY: Initial Assessment, Daily Note, and AM 7/23/2020  INPATIENT: PT Visit Days : 1  [place] / [place] / Product Type: Medicare /       NAME/AGE/GENDER: Adriana Albarran is a 81 y.o. male   PRIMARY DIAGNOSIS: Severe sepsis ([facility] [A41.9, R65.20] Severe sepsis ([facility] Severe sepsis ([facility]  Procedure(s) (LRB):  SACRAL WOUND DEBRIDEMENT  ROOM 632 (N/A)     ICD-10: Treatment Diagnosis:    Generalized Muscle Weakness (M62.81)  Other lack of cordination (R27.8)  Difficulty in walking, Not elsewhere classified (R26.2)  History of falling (Z91.81)   Precaution/Allergies:  Aspirin and Prednisone      ASSESSMENT:     Mr. Katarzyna Whitney is an 81year old M who presents for severe sepsis. Prior to hospital admission pt lives with his adult grandson in 1 story home with 3 step(s) to enter to backdoor. Pt endorses 1 fall in past 6 months. Prior to admission Mr. Katarzyna Whitney uses a RW for mobility. Upon entering, pt supine, agreeable to PT evaluation. he reports no pain at rest. BLE assessment indicates sensation to light touch intact, AROM WFL, and strength 4/5 in bilateral LE.  Pt performed supine > sit with mod A, sitting EOB with good static sitting balance control. Sit > stand with mod A using RW. Gait x 3 ft with mod A, cues for posture and to minimize posterior trunk lean. At end of session pt sidelaying in bed with all needs within reach, alarm activated for safety, RN notified. Pt presents as functioning below his baseline, with deficits in mobility including transfers, gait, balance, and activity tolerance. Pt will benefit from skilled therapy services to address stated deficits to promote return to highest level of function, independence, and safety. Will continue to follow. This section established at most recent assessment   PROBLEM LIST (Impairments causing functional limitations):  Decreased Strength  Decreased ADL/Functional Activities  Decreased Transfer Abilities  Decreased Ambulation Ability/Technique  Decreased Balance  Increased Pain   INTERVENTIONS PLANNED: (Benefits and precautions of physical therapy have been discussed with the patient.)  Balance Exercise  Bed Mobility  Electrical Stimulation  Family Education  Gait Training  Manual Therapy  Neuromuscular Re-education/Strengthening  Range of Motion (ROM)  Therapeutic Activites  Therapeutic Exercise/Strengthening  Transfer Training     TREATMENT PLAN: Frequency/Duration: 3 times a week for duration of hospital stay  Rehabilitation Potential For Stated Goals: Good     REHAB RECOMMENDATIONS (at time of discharge pending progress):    Placement: It is my opinion, based on this patient's performance to date, that Mr. Socorro Luque may benefit from intensive therapy at a [address] [place] [address] after discharge due to the functional deficits listed above that are likely to improve with skilled rehabilitation and concerns that he/she may be unsafe to be unsupervised at home due to fall history and poor  functional mobility .   Equipment:   None at this time              HISTORY:   History of Present Injury/Illness (Reason for Referral):  See H&P  Past Medical History/Comorbidities:    Conchita Epley  has a past medical history of Atherosclerosis of native arteries of the extremities with intermittent claudication (5/13/2014), Chronic kidney disease, Foot ulcer ([facility] (9/29/2017), Former cigarette smoker (5/13/2014), Gout, HTN (hypertension) (5/13/2014), Hypercholesterolemia, Hyperlipidemia (5/13/2014), and Peripheral vascular disease ([facility] Mr. Conchita Epley  has a past surgical history that includes hx hemorrhoidectomy; hx malignant skin lesion excision; and hx colonoscopy. Social History/Living Environment:   Home Environment: Private residence  One/Two Story Residence: One story  Living Alone: No  Support Systems: Family member(s)  Patient Expects to be Discharged to[de-identified] Unknown  Current DME Used/Available at Home: Oxygen, portable, Shower chair, Walker, rollator  Prior Level of Function/Work/Activity:  Mod I household ambulator w/ RW     Number of Personal Factors/Comorbidities that affect the Plan of Care: 1-2: MODERATE COMPLEXITY   EXAMINATION:   Most Recent Physical Functioning:   Gross Assessment:  AROM: Generally decreased, functional  Strength: Generally decreased, functional  Coordination: Generally decreased, functional               Posture:  Posture (WDL): Exceptions to WDL  Posture Assessment: Trunk flexion, Forward head  Balance:  Sitting: Intact  Standing: Impaired  Standing - Static: Fair  Standing - Dynamic : Poor Bed Mobility:  Supine to Sit: Moderate assistance  Sit to Supine: Moderate assistance  Scooting: Moderate assistance  Duration: 16 Minutes  Wheelchair Mobility:     Transfers:  Sit to Stand: Moderate assistance  Stand to Sit: Moderate assistance  Gait:     Base of Support: Widened  Speed/She: Slow;Shuffled  Step Length: Left shortened;Right shortened  Distance (ft): 3 Feet (ft)  Assistive Device: Walker, rolling;Gait belt  Ambulation - Level of Assistance: Minimal assistance  Interventions: Manual cues; Safety awareness training; Tactile cues; Verbal cues; Visual/Demos      Body Structures Involved:  Bones  Joints  Muscles Body Functions Affected:  Neuromusculoskeletal  Movement Related  Skin Related Activities and Participation Affected:  Learning and Applying Knowledge  General Tasks and Demands  Communication  Mobility  Self Care  Domestic Life  Interpersonal Interactions and Relationships   Number of elements that affect the Plan of Care: 3: MODERATE COMPLEXITY   CLINICAL PRESENTATION:   Presentation: Stable and uncomplicated: LOW COMPLEXITY   CLINICAL DECISION MAKIN[address] [place] [address] AM-PAC 6 Clicks   Basic Mobility Inpatient Short Form  How much difficulty does the patient currently have. .. Unable A Lot A Little None   1. Turning over in bed (including adjusting bedclothes, sheets and blankets)? [] 1   [] 2   [x] 3   [] 4   2. Sitting down on and standing up from a chair with arms ( e.g., wheelchair, bedside commode, etc.)   [] 1   [x] 2   [] 3   [] 4   3. Moving from lying on back to sitting on the side of the bed? [] 1   [x] 2   [] 3   [] 4   How much help from another person does the patient currently need. .. Total A Lot A Little None   4. Moving to and from a bed to a chair (including a wheelchair)? [] 1   [x] 2   [] 3   [] 4   5. Need to walk in hospital room? [] 1   [x] 2   [] 3   [] 4   6. Climbing 3-5 steps with a railing? [] 1   [x] 2   [] 3   [] 4   © , Trustees of [address] [place] [address], under license to [employer]. All rights reserved      Score:  Initial: 13 Most Recent: X (Date: -- )    Interpretation of Tool:  Represents activities that are increasingly more difficult (i.e. Bed mobility, Transfers, Gait). Medical Necessity:     Skilled intervention continues to be required due to poor functional mobility.   Reason for Services/Other Comments:     Use of outcome tool(s) and clinical judgement create a POC that gives a: Clear prediction of patient's progress: LOW COMPLEXITY            TREATMENT:   (In addition to Assessment/Re-Assessment sessions the following treatments were rendered)   Pre-treatment Symptoms/Complaints:  \"I want my strength back\"  Pain: Initial:   Pain Intensity 1: 0  Post Session:  0     Therapeutic Activity: (16 Minutes   ):  Therapeutic activities including Bed transfers and Ambulation on level ground to improve mobility, strength, balance, and coordination. Required moderate Manual cues; Safety awareness training; Tactile cues; Verbal cues; Visual/Demos to promote dynamic standing balance         Braces/Orthotics/Lines/Etc:   IV  leyva catheter  O2 Device: Room air  Treatment/Session Assessment:    Response to Treatment:  fatigue  Interdisciplinary Collaboration:   Physical Therapist  Registered Nurse  After treatment position/precautions:   Supine in bed  Bed alarm/tab alert on  Bed/Chair-wheels locked  Bed in low position  Call light within reach  RN notified   Compliance with Program/Exercises: Will assess as treatment progresses  Recommendations/Intent for next treatment session: \"Next visit will focus on advancements to more challenging activities\".   Total Treatment Duration:  PT Patient Time In/Time Out  Time In: 0929  Time Out: Boaz Finney

## 2020-07-23 NOTE — PROGRESS NOTES
TRANSFER - IN REPORT:    Verbal report received from Colt Garcia RN(name) on Ann Coles  being received from [facility]) for routine post - op      Report consisted of patients Situation, Background, Assessment and   Recommendations(SBAR). Information from the following report(s) SBAR was reviewed with the receiving nurse. Opportunity for questions and clarification was provided. Assessment completed upon patients arrival to unit and care assumed.

## 2020-07-23 NOTE — PROGRESS NOTES
Appears agitated. RN asked about med in [facility] that is pt supplied. They did not bring the med but were very upset that the patient has missed it since being here and why did no one tell them to bring it. As much of review as able was done, when review/explanations were attempted both pt and daughter appeared to be more upset and although attempts at therapeutic communication were made, same were rejected. Writer was at the bedside when pt was admitted to the unit during a shift change approx 2 days ago and the pt was more pleasant, agreeable, asked questions and allowed staff to answer. Currently, when a question is asked, RN is cut off verbally and with a wave. This is mentioned to convey that nurses are overall aware of pt's anxiety and uncertainty about being IP and best efforts are made to be of good help in tone, and behavior. Upon review, MD notes reveal the pt does drink alcohol regularly prior to admit. Will continue to monitor and give best efforts.

## 2020-07-23 NOTE — PROGRESS NOTES
TRANSFER - OUT REPORT:    Verbal report given to [address] on Nikky Sow  being transferred to Pre OP for routine progression of care       Report consisted of patients Situation, Background, Assessment and   Recommendations(SBAR). Information from the following report(s) SBAR was reviewed with the receiving nurse. Opportunity for questions and clarification was provided.       Patient transported with:   Transport

## 2020-07-23 NOTE — PROGRESS NOTES
END OF SHIFT NOTE:    INTAKE/OUTPUT  07/22 0701 - 07/23 0700  In: -   Out: 3592 [Urine:2575]  Voiding: YES  Catheter: YES-condom cath  Color: clear  Drain:              DIET  NPO    Flatus: Patient does have flatus present. Stool:  0 occurrences. Characteristics:  Stool Assessment  Stool Color: Brown  Stool Appearance: Loose  Stool Amount: Large  Stool Source/Status: Rectum    Ambulating  No    Emesis: 0 occurrences. Characteristics:          VITAL SIGNS  Patient Vitals for the past 12 hrs:   Temp Pulse Resp BP SpO2   07/23/20 1934  96 17  99 %   07/23/20 1921  97 18 126/64 99 %   07/23/20 1915  98 14 134/62 98 %   07/23/20 1901  100 18  98 %   07/23/20 1850  100 19 126/61 94 %   07/23/20 1846  (!) 101 25 130/62 96 %   07/23/20 1841  (!) 102 21 115/60 96 %   07/23/20 1836  (!) 102 23 119/63 94 %   07/23/20 1831  (!) 101 24 123/60 94 %   07/23/20 1826  (!) 103 16 124/60 92 %   07/23/20 1821  (!) 101 24 117/57 95 %   07/23/20 1817 98 °F (36.7 °C) (!) 106 16 115/65 95 %   07/23/20 1816  (!) 105  118/59 96 %   07/23/20 1811  (!) 106 14 115/56 94 %   07/23/20 1810  95   94 %   07/23/20 1622 97.7 °F (36.5 °C) 99 18 135/70 97 %   07/23/20 1605 98 °F (36.7 °C) 96 18 119/68 98 %   07/23/20 1113 97.3 °F (36.3 °C) 93 18 98/59 93 %       Pain Assessment  Pain Intensity 1: 4 (07/23/20 1622)  Pain Location 1: Buttocks  Pain Intervention(s) 1: Relaxation technique, Repositioned  Patient Stated Pain Goal: 0        approx 1800 pt's daughter at station, in tears. She was assisted to pt room. Pt was still in PACU. She was upset saying that her brother needed to be allowed up and past security now, as it is 'a matter of life and death!' she had tears in her eyes and anxiety in her voice. She was walked back to the pt room.  Security was called so the brother could come up as the daughter of the pt reported that this was Dr Lauren Swanson directive that the two together speak to the patient regarding the days events and next recommended course of action. Best understanding is that both siblings are to meet with Dr Chiara Fish and the pt in the pts room tomorrow near noon time to discuss. PM RN updated.      Ramez Padron RN

## 2020-07-23 NOTE — PROGRESS NOTES
Comprehensive Nutrition Assessment    Type and Reason for Visit: Reassess(TPN Management per Hospitalist)    Nutrition Assessment:  Admitted w severe sepsis  Scheduled for debridement and exploration of stage IV large sacral decubitus with possible rectal communicationversus fecal soiling. PMH remarkable for COPD, PVD, gout, CKD, and ETOH abuse. Plan to OR 7/23 for debridement. PN started 7/22 as PICC wasn't available at RX cutoff time for PN orders. Double lumen PICC 7/22. Nutrition History and Allergies: Pt reports hx of fall ~10 days ago with minimal mobility and oral intake since that time. He reports his family has been providing foods and beverages at best during this time he was able to consume Alphia Jacque half a handful of food. \"  He c/o early satiety and abdominal fullness. He reports prior to this acute decline his overall intake had been declining for ~ 6 months. He has experienced wt loss of 4% over the past 2 months and a total of 8% over the past year based on current bed weight. This weight is likely falsely elevated secondary to edema. Visit with pt and daughter at bedside. Daughter confirms history of poor po intake reported by pt at initial RD visit. PPN infusing at goal via PICC. Pt c/o not having water. Reports he did eat portions of lunch and dinner last pm indicating likely more than he ate for the past ~2 weeks.     Last recorded BM: 7/22  Edema: 1+ BLE  Pertinent Medications: clindamycin, plavix, zosyn, thiamine, vancomycin  Replacements 7/22 - Mg 2 gram, KDur 40 meq x 2, KCl received 40 meq times 2 doses  Lab Results   Component Value Date/Time    Sodium 142 07/23/2020 04:44 AM    Potassium 3.8 07/23/2020 04:44 AM    Chloride 112 (H) 07/23/2020 04:44 AM    CO2 21 07/23/2020 04:44 AM    Anion gap 9 07/23/2020 04:44 AM    Glucose 119 (H) 07/23/2020 04:44 AM    BUN 25 (H) 07/23/2020 04:44 AM    Creatinine 0.93 07/23/2020 04:44 AM    Calcium 8.4 07/23/2020 04:44 AM    Albumin 1.1 (L) 07/23/2020 04:44 AM    Phosphorus 1.7 (L) 07/23/2020 04:44 AM   Mg 2.2   Labs are remarkable for corrected K (with significant replacement received), mild increase in am glucose, depleted phos (indicative of refeeding), corrected Mg s/p replacement. Malnutrition Assessment:  Malnutrition Status:  Severe malnutrition    Context:  Acute illness     Findings of the 6 clinical characteristics of malnutrition:   Energy Intake:  7 - 50% or less of est energy requirements for 5 or more days  Body Fat Loss:  7 - Moderate body fat loss, Triceps   Muscle Mass Loss:  7 - Moderate muscle mass loss, Calf, Clavicles (pectoralis & deltoids), Hand (interosseous), Scapula (trapezius), Temples (temporalis)    Estimated Daily Nutrient Needs:  Energy (kcal):  0254-4078(30-35kcal/kg IBW 70kg secondary edema, hx CKD, wound)  Protein (g):  105-150(1.5-2 g/kg IBW d/t wnd)       Fluid (ml/day):  1ml/kcal/day  CHO limit per day: 403 grams CHO/day (4mg/kg/min/day)  Current Nutrition Therapies:   DIET NPO  Current Parenteral Nutrition Orders:  · Type and Formula: 5%Dex/4.25%AA   · Lipids: 250ml, Daily  · Duration: Continuous  · Rate/Volume: 42ml/hr  · Current PN Order Provides: 840 kcal/d (40% of needs), 43 grams of protein/d (41% of needs), 50 grams of CHO/d (does not exceed maximum CHO load) and ~1250 ml of total volume/d (~60% of needs).      Anthropometric Measures:  · Height:  5' 8\" (172.7 cm)  · Current Body Wt:  81.6 kg (179 lb 14.3 oz)   · Admission Body Wt:  180 lb(stated wt)    · Usual Body Wt:  195 lb     · Ideal Body Wt:  154:  116.8 %   Last 3 Recorded Weights in this Encounter    07/21/20 1526   Weight: 81.6 kg (180 lb)     Nutrition Diagnosis:   · Severe malnutrition related to catabolic illness, inadequate protein-energy intake as evidenced by (malnutrition criteria as identified above)    Nutrition Interventions:   Food and/or Nutrient Delivery: Modify parenteral nutrition  Change to TPN:  15%DEX/ 5%AA at 1 liter with 250 ml 20% Lipids daily changed to over 12 hours  To provide: 1210 kcal/d (58% of needs), 60 grams of protein/d (57% of needs), 150 grams of CHO/d (does not exceed maximum CHO load) and ~1250 ml of total volume/d (60% of needs). Lytes/L of TPN:   Sodium 30 meq (30 meq NaCl), Potassium 60 meq (25 meq KCl, 35 meq KPO4), 8 meq Mg, 4.5 meq Calcium  Other additives: MTE, MVI  IVF:  Maintain current IVF as pt will only receiv a liter of TPN secondary concerns for refeeding syndrome. Vitamin and Mineral Supplement Therapy:  Nutrition support orders for electrolyte management replacement are activated and placed on the MAR. Phos replacement per protocol today. Labs:   BMP daily, Phos tomorrow then MWF, Mg MWF. POC Glucoses/SSI if Glucose > 180 mg/dl on AM BMP. Coordination of Nutrition Care: (Discussed with Sohail Bronson RN. )    Goals:  Meet > 75 % estimated needs within 7 days       Nutrition Monitoring and Evaluation:   Food/Nutrient Intake Outcomes: Parenteral nutrition intake/tolerance  Physical Signs/Symptoms Outcomes: Biochemical data    Discharge Planning:     Too soon to determine     Electronically signed by Ora Mendez MA, RD, LDN on 7/23/2020 at 12:40 PM  Contact: [phone]

## 2020-07-23 NOTE — PERIOP NOTES
TRANSFER - OUT REPORT:    Verbal report given to [facility] on Brian Palmer  being transferred to room 632 for routine post - op       Report consisted of patients Situation, Background, Assessment and   Recommendations(SBAR). Information from the following report(s) SBAR, Kardex, OR Summary, Intake/Output and MAR was reviewed with the receiving nurse. Lines:   PICC Double Lumen 28/92/35 Right;Basilic (Active)   Central Line Being Utilized Yes 07/23/20 1648   Criteria for Appropriate Use Irritant/vesicant medication 07/23/20 1648   Site Assessment Clean, dry, & intact 07/23/20 1648   Phlebitis Assessment 0 07/23/20 1648   Infiltration Assessment 0 07/23/20 1648   Arm Circumference (cm) 24 cm 07/22/20 1623   Date of Last Dressing Change 07/22/20 07/23/20 1358   Dressing Status Clean, dry, & intact 07/23/20 1648   Action Taken Other (comment) 07/23/20 1358   External Catheter Length (cm) 0 centimeters 07/22/20 1623   Dressing Type Disk with Chlorhexadine gluconate (CHG) 07/23/20 1648   Hub Color/Line Status Flushed 07/23/20 1648   Positive Blood Return (Site #1) Yes 07/23/20 0147   Hub Color/Line Status Red 07/23/20 0147   Positive Blood Return (Site #2) Yes 07/23/20 0147   Alcohol Cap Used No 07/23/20 1358       Peripheral IV 07/22/20 Left Antecubital (Active)   Site Assessment Clean, dry, & intact 07/23/20 0555   Phlebitis Assessment 0 07/23/20 0555   Infiltration Assessment 0 07/23/20 0555   Dressing Status Clean, dry, & intact 07/23/20 0555   Dressing Type Tape;Transparent 07/23/20 0555   Hub Color/Line Status Patent; Flushed 07/23/20 0555       Peripheral IV 07/22/20 Anterior; Left Forearm (Active)   Site Assessment Clean, dry, & intact 07/23/20 1648   Phlebitis Assessment 0 07/23/20 1648   Infiltration Assessment 0 07/23/20 1648   Dressing Status Clean, dry, & intact 07/23/20 1648   Dressing Type Transparent 07/23/20 1648   Hub Color/Line Status Infusing 07/23/20 1648        Opportunity for questions and clarification was provided. Patient transported with:   O2 @ 2 liters    VTE prophylaxis orders have been written for Yi Dunn.

## 2020-07-23 NOTE — PROGRESS NOTES
Hospitalist Progress Note     Admit Date:  2020  3:20 PM   Name:  Sissy Ocampo   Age:  81 y.o.  :  1935   MRN:  [id number]   PCP:  Severa Carney, MD  Treatment Team: Attending Provider: Cristopher Suazo DO; Consulting Provider: Lorene Rojas MD; Consulting Provider: Carlita Lara MD; Utilization Review: Shirley Schmid; Care Manager: Rhonda Finley; Physical Therapist: Sal Lemus    Subjective:   HPI and or CC:  Copied from admission history and physical HPI:  History of presentation 69-year-old male former smoker with oxygen dependent COPD,peripheral vascular disease, tophaceous gout, ckd with creat 1.1 2019 and 1.25 in May 2020, alcohol use, and poor oral intake. He reports that his dyspnea started sometime in March around allergy season and progressed. Seen multiple providers and recent echocardiogram with preserved LVEF and no severe pulmonary hypertension. He was tested for COVID-19 sometime in the last 6 weeks and negative. No fevers. No upper respiratory infectious complaints. On presentation found to have serum sodium of 127 serum potassium of 2.2 a serum creatinine of 1.7 and a white blood count of 14,000 with left shift and sepsis criteria. Lactic acid normal.  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 Has been given Zosyn in emergency department. Patient reportedly lives with his grandson and spends most of the day in a chair and is not ambulatory due to tophaceous gout and peripheral vascular disease with claudication. He has not been eating or drinking well for several months. He had people preparing meals for him 3 times daily as his children look in on him several times daily. He has been more isolated with recent COVID 19 social distancing measures. He appears to have severe debility worrisome markers of severe sepsis and a large fistula versus deep decubitus with fistulization versus other.   He will need antibiotics and imaging and surgical intervention but does not appear to have acute abscess in need of urgent surgical intervention and will correct metabolic abnormalities preoperatively. Remarkably pain is not severe. He does have weight loss unspecified amount. Addendum 6:50 PMwas able to reach son Katina Velásquez and daughter Edwina Vilchis apparently patient has been refusing staying in bed and would only stay in chair. Family arranged for home health and recently obtained a chairlift for the patient. They were notified by home health nursing today of sacral region wound which prompted ER evaluation. No known previous infection/fistula or abscess. They do confirm that patient oral intake of both food and liquids has been poor. They are frustrated about not being able to be with patient in ER and went daily updates. Discussed Kelly Lyn as person to update and communicate with family      July 23, 2020:  Chief complaint agitation  Hypokalemia, hyponatremia improved. Blood pressure fairly stable with holding home BP meds diltiazem. Increased agitation today he drinks alcohol daily. This is third hospital day admitted the evening of July 21. Has as needed Ativan for agitationclinically worsensmay need ciwa protocol. Scheduled for debridement and exploration of stage IV large sacral decubitus with possible rectal communicationversus fecal soiling. Significant total calorie and protein malnutrition receiving TPN and appetite is poor but improving. Nutrition consult  management.   They are aware objective:     Patient Vitals for the past 24 hrs:   Temp Pulse Resp BP SpO2   07/23/20 1113 97.3 °F (36.3 °C) 93 18 98/59 93 %   07/23/20 0728 98.1 °F (36.7 °C) (!) 103 18 118/71 95 %   07/23/20 0450 97.4 °F (36.3 °C) (!) 110 18 114/61 93 %   07/23/20 0400  (!) 103      07/23/20 0027 98.2 °F (36.8 °C) (!) 106 19 119/76 97 %   07/22/20 2110     97 %   07/22/20 2000  (!) 109      07/22/20 1930 98.2 °F (36.8 °C) (!) 109 18 127/73 96 %   07/22/20 1721 97.2 °F (36.2 °C) (!) 101 18 112/65 98 %     Oxygen Therapy  O2 Sat (%): 93 % (07/23/20 1113)  Pulse via Oximetry: 106 beats per minute (07/22/20 2110)  O2 Device: Room air (07/23/20 1113)    Intake/Output Summary (Last 24 hours) at 7/23/2020 1209  Last data filed at 7/23/2020 0456  Gross per 24 hour   Intake    Output 2575 ml   Net -2575 ml         REVIEW OF SYSTEMS: Comprehensive ROS performed and negative except as stated in HPI. Physical Examination:  General:    Increased agitation today, dyspnea resolved. Head:  Nares patent, extraocular muscles intact and pupils equal and round reactive  CV:   No JVD or HJR no increased lower extremity edema. Lungs:   Clear to auscultation palpation and percussion with no gross consolidation. Both lungs symmetrically bilateral.  Abdomen:   Dressing clean and dry posterior. Bowel sounds present all 4 quadrants no gross abdominal distention  Extremities: Bilateral tophi unchangedno obvious acute gouterythema or increased pain subjectively. Moves all extremities symmetrically  Skin:     No petechia or purpura  Neuro:  No past-pointing no dysdiadochokinesia      Data Review:  I have reviewed all labs, meds, telemetry events, and studies from the last 24 hours.     Recent Results (from the past 24 hour(s))   METABOLIC PANEL, BASIC    Collection Time: 07/22/20  4:44 PM   Result Value Ref Range    Sodium 144 136 - 145 mmol/L    Potassium 5.0 3.5 - 5.1 mmol/L    Chloride 119 (H) 98 - 107 mmol/L    CO2 19 (L) 21 - 32 mmol/L    Anion gap 6 (L) 7 - 16 mmol/L    Glucose 101 (H) 65 - 100 mg/dL    BUN 30 (H) 8 - 23 MG/DL    Creatinine 0.94 0.8 - 1.5 MG/DL    GFR est AA >60 >60 ml/min/1.73m2    GFR est non-AA >60 >60 ml/min/1.73m2    Calcium 6.9 (L) 8.3 - 34.4 MG/DL   METABOLIC PANEL, COMPREHENSIVE    Collection Time: 07/23/20  4:44 AM   Result Value Ref Range    Sodium 142 136 - 145 mmol/L    Potassium 3.8 3.5 - 5.1 mmol/L Chloride 112 (H) 98 - 107 mmol/L    CO2 21 21 - 32 mmol/L    Anion gap 9 7 - 16 mmol/L    Glucose 119 (H) 65 - 100 mg/dL    BUN 25 (H) 8 - 23 MG/DL    Creatinine 0.93 0.8 - 1.5 MG/DL    GFR est AA >60 >60 ml/min/1.73m2    GFR est non-AA >60 >60 ml/min/1.73m2    Calcium 8.4 8.3 - 10.4 MG/DL    Bilirubin, total 0.2 0.2 - 1.1 MG/DL    ALT (SGPT) 39 12 - 65 U/L    AST (SGOT) 95 (H) 15 - 37 U/L    Alk. phosphatase 131 50 - 136 U/L    Protein, total 6.1 (L) 6.3 - 8.2 g/dL    Albumin 1.1 (L) 3.2 - 4.6 g/dL    Globulin 5.0 (H) 2.3 - 3.5 g/dL    A-G Ratio 0.2 (L) 1.2 - 3.5     CBC WITH AUTOMATED DIFF    Collection Time: 07/23/20  4:44 AM   Result Value Ref Range    WBC 10.3 4.3 - 11.1 K/uL    RBC 3.11 (L) 4.23 - 5.6 M/uL    HGB 10.1 (L) 13.6 - 17.2 g/dL    HCT 29.3 (L) 41.1 - 50.3 %    MCV 94.2 79.6 - 97.8 FL    MCH 32.5 26.1 - 32.9 PG    MCHC 34.5 31.4 - 35.0 g/dL    RDW 13.8 11.9 - 14.6 %    PLATELET 643 422 - 600 K/uL    MPV 8.6 (L) 9.4 - 12.3 FL    ABSOLUTE NRBC 0.00 0.0 - 0.2 K/uL    DF AUTOMATED      NEUTROPHILS 71 43 - 78 %    LYMPHOCYTES 12 (L) 13 - 44 %    MONOCYTES 10 4.0 - 12.0 %    EOSINOPHILS 2 0.5 - 7.8 %    BASOPHILS 1 0.0 - 2.0 %    IMMATURE GRANULOCYTES 5 0.0 - 5.0 %    ABS. NEUTROPHILS 7.3 1.7 - 8.2 K/UL    ABS. LYMPHOCYTES 1.2 0.5 - 4.6 K/UL    ABS. MONOCYTES 1.0 0.1 - 1.3 K/UL    ABS. EOSINOPHILS 0.2 0.0 - 0.8 K/UL    ABS. BASOPHILS 0.1 0.0 - 0.2 K/UL    ABS. IMM. GRANS.  0.5 0.0 - 0.5 K/UL   PHOSPHORUS    Collection Time: 07/23/20  4:44 AM   Result Value Ref Range    Phosphorus 1.7 (L) 2.3 - 3.7 MG/DL   MAGNESIUM    Collection Time: 07/23/20  4:44 AM   Result Value Ref Range    Magnesium 2.2 1.8 - 2.4 mg/dL   TRIGLYCERIDE    Collection Time: 07/23/20  4:44 AM   Result Value Ref Range    Triglyceride 146 35 - 150 MG/DL        All Micro Results     Procedure Component Value Units Date/Time    CULTURE, BLOOD [[id number]] Collected:  07/21/20 2015    Order Status:  Completed Specimen:  Blood Updated: 07/23/20 0940     Special Requests: --        LEFT  Antecubital       Culture result: NO GROWTH 2 DAYS       CULTURE, URINE [[id number]]     Order Status:  Sent Specimen:  Cath Urine           Current Meds:  Current Facility-Administered Medications   Medication Dose Route Frequency    tuberculin injection 5 Units  5 Units IntraDERMal ONCE    sodium phosphate 30 mmol in 0.9% sodium chloride 250 mL infusion   IntraVENous ONCE    vancomycin (VANCOCIN) 1,000 mg in 0.9% sodium chloride (MBP/ADV) 250 mL  1,000 mg IntraVENous Q24H    glycopyrrolate-formoterol (BEVESPI AEROSPHERE) 9 mcg-4.8 mcg inhaler  2 Puff Inhalation BID RT    sodium hypochlorite (QUARTER STRENGTH DAKIN'S) 0.125% irrigation (bottle)   Topical BID    TPN ADULT - dextrose 5% amino acid 4.25%   IntraVENous QPM    fat emulsion 20% (LIPOSYN, INTRAlipid) infusion 250 mL  250 mL IntraVENous QPM    NUTRITIONAL SUPPORT ELECTROLYTE PRN ORDERS   Does Not Apply PRN    0.9% sodium chloride infusion  83 mL/hr IntraVENous CONTINUOUS    central line flush (saline) syringe 20 mL  20 mL InterCATHeter Q8H    sodium chloride (NS) flush 5-40 mL  5-40 mL IntraVENous Q8H    sodium chloride (NS) flush 5-40 mL  5-40 mL IntraVENous PRN    clindamycin (CLEOCIN) 600mg D5W 50mL IVPB (premix)  600 mg IntraVENous Q8H    enoxaparin (LOVENOX) injection 40 mg  40 mg SubCUTAneous Q24H    [Held by provider] clopidogreL (PLAVIX) tablet 75 mg  75 mg Oral DAILY    albuterol (PROVENTIL VENTOLIN) nebulizer solution 2.5 mg  2.5 mg Nebulization Q4H PRN    [Held by provider] dilTIAZem ER (CARDIZEM CD) capsule 120 mg  120 mg Oral DAILY    probenecid-colchicine (ColBenemid) tablet 1 Tab (Patient Supplied)  1 Tab Oral DAILY    piperacillin-tazobactam (ZOSYN) 3.375 g in 0.9% sodium chloride (MBP/ADV) 100 mL  3.375 g IntraVENous Q8H    thiamine (B-1) 100 mg in 0.9% sodium chloride 50 mL IVPB  100 mg IntraVENous DAILY    LORazepam (ATIVAN) tablet 1 mg  1 mg Oral Q6H PRN    morphine injection 2 mg  2 mg IntraVENous Q4H PRN    acetaminophen (TYLENOL) tablet 650 mg  650 mg Oral Q4H PRN       Diet:  DIET NPO    Other Studies (last 24 hours):  Ct Abd Pelv W Cont    Result Date: 7/22/2020  CT OF THE ABDOMEN AND PELVIS INDICATION: rectocutaneous fistula, vs large sacral decubitus. COMPARISON: CT dated 9/23/2019 TECHNIQUE: Multiple axial images were obtained through the abdomen and pelvis after intravenous injection of 100 mL Isovue 370. Intravenous contrast was used for better evaluation of solid organs and vascular structures. Oral contrast was used for bowel opacification. Radiation dose reduction techniques were used for this study. Our CT scanners use one or all of the following: Automated exposure control, adjustment of the mA and/or kV according to patient size, iterative reconstruction. FINDINGS: Visualized thorax: Consolidative appearing opacities in the bilateral lung bases. Dense multivessel coronary atherosclerotic ossifications. Aortic annular calcifications. Liver: Normal in size and morphology. No focal lesions. Gallbladder/biliary: Apparent trace gallbladder sludge. No biliary dilation. Pancreas: Normal. Spleen: Normal. Adrenals: Normal. Kidneys: No focal lesion or hydronephrosis. Bladder: Normal. Pelvic organs: Normal prostate and seminal vesicles. Gastrointestinal: Liquid feces noted in the colon. There is a tiny amount of gas near the external anal sphincter or at the anal verge on series 2, image 91 with possible adjacent tract. No definite perianal abscess. Wall thickening of the rectum. Peritoneum/retroperitoneum: Normal. Lymph nodes: Normal. Vessels: Dense aortoiliac atherosclerotic calcification. Bones/Soft tissues: Large sacral decubitus ulcer extends to the coccyx. Destruction of underlying bone not excluded. IMPRESSION: 1. Large sacral decubitus ulcer extends to the coccyx with destruction of underlying bone not excluded by CT.  Consider evaluation with MRI to evaluate for osteomyelitis. 2.  Tiny amount of gas at the left posterior aspect of the external anal sphincter or at the anal verge with possible adjacent tract. Underlying perianal fistula not excluded. 3.  Mild proctitis. 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  ICD-9-CM: 565.1  7/21/2020 - Present Unknown        Hyponatremia ICD-10-CM: E87.1  ICD-9-CM: 276.1  7/21/2020 - Present Unknown        Hypokalemia ICD-10-CM: E87.6  ICD-9-CM: 276.8  7/21/2020 - Present Unknown        Acute kidney injury (MARY) with acute tubular necrosis (ATN) (HCC) ICD-10-CM: N17.0  ICD-9-CM: 584.5  7/21/2020 - Present Unknown        Chronic hypoxemic respiratory failure (HCC) ICD-10-CM: J96.11  ICD-9-CM: 518.83, 799.02  7/21/2020 - Present Unknown        Normocytic anemia ICD-10-CM: D64.9  ICD-9-CM: 285.9  7/21/2020 - Present Unknown        Moderate protein malnutrition (HCC) ICD-10-CM: E44.0  ICD-9-CM: 263.0  7/21/2020 - Present Unknown        Chronic obstructive pulmonary disease ([facility] ICD-10-CM: J44.9  ICD-9-CM: 496  6/22/2020 - Present Yes        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  ICD-9-CM: 274.03  5/18/2018 - Present Unknown        Atherosclerosis of native artery of extremity with intermittent claudication (HCC) (Chronic) ICD-10-CM: F00.966  ICD-9-CM: 440.21  5/13/2014 - Present Yes        Former cigarette smoker ICD-10-CM: H87.956  ICD-9-CM: V15.82  5/13/2014 - Present Yes        HTN (hypertension) (Chronic) ICD-10-CM: I10  ICD-9-CM: 401.9  5/13/2014 - Present Yes A/P:    1. Severe sepsis secondary to sacral fistula versus abscess versus other  blood pressure more stable, acute kidney injury resolved. Alert and oriented x3continue broad-spectrum antibiotics continue with therapy postop based on operative cultures.     2. Acute kidney injuryIV hydration, Stern catheter--continue Stern catheter regarding OR for debridement consider discontinue Stern next 24 hours postop     3. Hyponatremia hypokalemia. Resolvedrepleted     4. Tophaceous goutcontinue home meds with pharmacy adjustment for renal function --serum urate 11 monitor increased uric acid in part related to acute infectionfollow-up in a few days postop. Continue home probenecid     5. Significant protein and total calorie malnutritionappreciate nutrition managementTPN and hopefully increased oral intake postoperatively.     6.  Daily alcoholIV thiamine daily and Ativan PRN for now. If clinically worse consider ciwa protocol. Encourage Ativan use for agitation/anxiety    7. Peripheral vascular disease continue home Plavixhave held Pletal for now. This is unchanged  8. History of oxygen dependent COPDcontinue PRN albuterol and oxygen as needed. Continuing with substitutes for home meds. 9.  History of hypertension--Home Lasix held. Diltiazem held slowly resume pending clinical course. Overall prognosis appears to remain guarded due to his presentation nutritional status and comorbiditiesI have spoken with son Pedro Tello [phone]SJO oldest child is Jl Cochran [phone]     Family requesting Daily updatespoke with daughter at bedside today     Discharge planning: Skilled nursing facility  DVT ppx: Lovenox     Code status: Full code  Decision Maker: Selfoldest child is Jl Cochran [phone]      Signed:  Toby DANIELSON

## 2020-07-23 NOTE — PERIOP NOTES
TRANSFER - IN REPORT:    Verbal report received from Ericka Escalona, [facility] on [facility]  being received from [phone] for ordered procedure      Report consisted of patients Situation, Background, Assessment and   Recommendations(SBAR). Information from the following report(s) SBAR and MAR was reviewed with the receiving nurse. Opportunity for questions and clarification was provided. Assessment to be completed upon patients arrival to unit and care assumed.

## 2020-07-23 NOTE — PROGRESS NOTES
Chart screened by  for discharge planning. Per PT Eval this day, the patient has been recommended for STR. CM attempted to see patient to discuss discharge planning. However, per Floor RN Ericka Escalona, the patient is off the floor at this time, for a procedure. CM attempted to call patient's daughter Nanda Cornell this day to discuss PT's recommendation, however, CM was unsuccessful at reaching the patient's daughter. CM left a voicemail requesting a call back. CM will follow up with patient , at the appropriate time. Please consult or notify   if any new issues arise. CM continues to follow.

## 2020-07-23 NOTE — BRIEF OP NOTE
Brief Postoperative Note    Patient: Ann Coles  YOB: 1935  MRN: [id number]    Date of Procedure: 7/23/2020     Pre-Op Diagnosis: Wound of sacral region, initial encounter [S31.000A]    Post-Op Diagnosis: 1. infected sacral wound with osteomyelitis;   2. 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

## 2020-07-23 NOTE — PROGRESS NOTES
Interdisciplinary Rounds completed. Nursing, Case Management, and Physician  present. Plan of care reviewed and updated. For surgical debridement.

## 2020-07-24 PROBLEM — E43 SEVERE PROTEIN-CALORIE MALNUTRITION (HCC): Chronic | Status: ACTIVE | Noted: 2020-07-21

## 2020-07-24 LAB
ALBUMIN SERPL-MCNC: 1.2 G/DL (ref 3.2–4.6)
ALBUMIN/GLOB SERPL: 0.2 {RATIO} (ref 1.2–3.5)
ALP SERPL-CCNC: 131 U/L (ref 50–136)
ALT SERPL-CCNC: 41 U/L (ref 12–65)
ANION GAP SERPL CALC-SCNC: 6 MMOL/L (ref 7–16)
AST SERPL-CCNC: 69 U/L (ref 15–37)
BASOPHILS # BLD: 0 K/UL (ref 0–0.2)
BASOPHILS NFR BLD: 0 % (ref 0–2)
BILIRUB SERPL-MCNC: 0.2 MG/DL (ref 0.2–1.1)
BUN SERPL-MCNC: 20 MG/DL (ref 8–23)
CALCIUM SERPL-MCNC: 8.3 MG/DL (ref 8.3–10.4)
CHLORIDE SERPL-SCNC: 117 MMOL/L (ref 98–107)
CO2 SERPL-SCNC: 22 MMOL/L (ref 21–32)
CREAT SERPL-MCNC: 1.02 MG/DL (ref 0.8–1.5)
DIFFERENTIAL METHOD BLD: ABNORMAL
EOSINOPHIL # BLD: 0 K/UL (ref 0–0.8)
EOSINOPHIL NFR BLD: 0 % (ref 0.5–7.8)
ERYTHROCYTE [DISTWIDTH] IN BLOOD BY AUTOMATED COUNT: 14.6 % (ref 11.9–14.6)
GLOBULIN SER CALC-MCNC: 5.2 G/DL (ref 2.3–3.5)
GLUCOSE SERPL-MCNC: 157 MG/DL (ref 65–100)
HCT VFR BLD AUTO: 29 % (ref 41.1–50.3)
HGB BLD-MCNC: 9.6 G/DL (ref 13.6–17.2)
IMM GRANULOCYTES # BLD AUTO: 0.9 K/UL (ref 0–0.5)
IMM GRANULOCYTES NFR BLD AUTO: 7 % (ref 0–5)
LYMPHOCYTES # BLD: 0.9 K/UL (ref 0.5–4.6)
LYMPHOCYTES NFR BLD: 7 % (ref 13–44)
MAGNESIUM SERPL-MCNC: 2.3 MG/DL (ref 1.8–2.4)
MCH RBC QN AUTO: 33.2 PG (ref 26.1–32.9)
MCHC RBC AUTO-ENTMCNC: 33.1 G/DL (ref 31.4–35)
MCV RBC AUTO: 100.3 FL (ref 79.6–97.8)
MM INDURATION POC: 0 0 MM (ref 0–5)
MONOCYTES # BLD: 0.9 K/UL (ref 0.1–1.3)
MONOCYTES NFR BLD: 7 % (ref 4–12)
NEUTS SEG # BLD: 9.8 K/UL (ref 1.7–8.2)
NEUTS SEG NFR BLD: 79 % (ref 43–78)
NRBC # BLD: 0 K/UL (ref 0–0.2)
PHOSPHATE SERPL-MCNC: 5.5 MG/DL (ref 2.3–3.7)
PLATELET # BLD AUTO: 352 K/UL (ref 150–450)
PLATELET COMMENTS,PCOM: ADEQUATE
PMV BLD AUTO: 8.7 FL (ref 9.4–12.3)
POTASSIUM SERPL-SCNC: 4.6 MMOL/L (ref 3.5–5.1)
PPD POC: NORMAL NEGATIVE
PROT SERPL-MCNC: 6.4 G/DL (ref 6.3–8.2)
RBC # BLD AUTO: 2.89 M/UL (ref 4.23–5.6)
RBC MORPH BLD: ABNORMAL
SODIUM SERPL-SCNC: 145 MMOL/L (ref 136–145)
VANCOMYCIN TROUGH SERPL-MCNC: 11.9 UG/ML (ref 5–20)
WBC # BLD AUTO: 12.5 K/UL (ref 4.3–11.1)
WBC MORPH BLD: ABNORMAL

## 2020-07-24 PROCEDURE — 77030040393 HC DRSG OPTIFOAM GENT MDII -B

## 2020-07-24 PROCEDURE — 84100 ASSAY OF PHOSPHORUS: CPT

## 2020-07-24 PROCEDURE — 94640 AIRWAY INHALATION TREATMENT: CPT

## 2020-07-24 PROCEDURE — 77030018836 HC SOL IRR NACL ICUM -A

## 2020-07-24 PROCEDURE — 85025 COMPLETE CBC W/AUTO DIFF WBC: CPT

## 2020-07-24 PROCEDURE — 74011000250 HC RX REV CODE- 250: Performed by: FAMILY MEDICINE

## 2020-07-24 PROCEDURE — 65660000000 HC RM CCU STEPDOWN

## 2020-07-24 PROCEDURE — 80202 ASSAY OF VANCOMYCIN: CPT

## 2020-07-24 PROCEDURE — 83735 ASSAY OF MAGNESIUM: CPT

## 2020-07-24 PROCEDURE — 74011000258 HC RX REV CODE- 258: Performed by: FAMILY MEDICINE

## 2020-07-24 PROCEDURE — 74011250636 HC RX REV CODE- 250/636: Performed by: INTERNAL MEDICINE

## 2020-07-24 PROCEDURE — 74011000258 HC RX REV CODE- 258: Performed by: INTERNAL MEDICINE

## 2020-07-24 PROCEDURE — 74011250637 HC RX REV CODE- 250/637: Performed by: INTERNAL MEDICINE

## 2020-07-24 PROCEDURE — 94760 N-INVAS EAR/PLS OXIMETRY 1: CPT

## 2020-07-24 PROCEDURE — 80053 COMPREHEN METABOLIC PANEL: CPT

## 2020-07-24 PROCEDURE — 74011000250 HC RX REV CODE- 250: Performed by: INTERNAL MEDICINE

## 2020-07-24 PROCEDURE — 74011250636 HC RX REV CODE- 250/636: Performed by: FAMILY MEDICINE

## 2020-07-24 PROCEDURE — 97530 THERAPEUTIC ACTIVITIES: CPT

## 2020-07-24 RX ORDER — ONDANSETRON 2 MG/ML
4 INJECTION INTRAMUSCULAR; INTRAVENOUS
Status: DISCONTINUED | OUTPATIENT
Start: 2020-07-24 | End: 2020-07-29 | Stop reason: HOSPADM

## 2020-07-24 RX ORDER — FOLIC ACID 1 MG/1
1 TABLET ORAL DAILY
Status: DISCONTINUED | OUTPATIENT
Start: 2020-07-25 | End: 2020-07-29 | Stop reason: HOSPADM

## 2020-07-24 RX ORDER — LANOLIN ALCOHOL/MO/W.PET/CERES
100 CREAM (GRAM) TOPICAL DAILY
Status: DISCONTINUED | OUTPATIENT
Start: 2020-07-25 | End: 2020-07-29 | Stop reason: HOSPADM

## 2020-07-24 RX ADMIN — MORPHINE SULFATE 2 MG: 2 INJECTION, SOLUTION INTRAMUSCULAR; INTRAVENOUS at 21:35

## 2020-07-24 RX ADMIN — Medication 20 ML: at 14:27

## 2020-07-24 RX ADMIN — ENOXAPARIN SODIUM 40 MG: 40 INJECTION SUBCUTANEOUS at 20:18

## 2020-07-24 RX ADMIN — Medication 20 ML: at 05:34

## 2020-07-24 RX ADMIN — THIAMINE HYDROCHLORIDE 100 MG: 100 INJECTION, SOLUTION INTRAMUSCULAR; INTRAVENOUS at 09:57

## 2020-07-24 RX ADMIN — CLINDAMYCIN IN 5 PERCENT DEXTROSE 600 MG: 12 INJECTION, SOLUTION INTRAVENOUS at 10:26

## 2020-07-24 RX ADMIN — DAKIN'S SOLUTION 0.125% (QUARTER STRENGTH): 0.12 SOLUTION at 20:00

## 2020-07-24 RX ADMIN — I.V. FAT EMULSION 250 ML: 20 EMULSION INTRAVENOUS at 18:31

## 2020-07-24 RX ADMIN — Medication 20 ML: at 21:35

## 2020-07-24 RX ADMIN — GLYCOPYRROLATE AND FORMOTEROL FUMARATE 2 PUFF: 9; 4.8 AEROSOL, METERED RESPIRATORY (INHALATION) at 11:00

## 2020-07-24 RX ADMIN — VANCOMYCIN HYDROCHLORIDE 1000 MG: 1 INJECTION, POWDER, LYOPHILIZED, FOR SOLUTION INTRAVENOUS at 21:34

## 2020-07-24 RX ADMIN — DAKIN'S SOLUTION 0.125% (QUARTER STRENGTH): 0.12 SOLUTION at 10:28

## 2020-07-24 RX ADMIN — PIPERACILLIN AND TAZOBACTAM 3.38 G: 3; .375 INJECTION, POWDER, FOR SOLUTION INTRAVENOUS at 15:29

## 2020-07-24 RX ADMIN — PIPERACILLIN AND TAZOBACTAM 3.38 G: 3; .375 INJECTION, POWDER, FOR SOLUTION INTRAVENOUS at 09:54

## 2020-07-24 RX ADMIN — Medication 10 ML: at 10:33

## 2020-07-24 RX ADMIN — Medication 10 ML: at 05:34

## 2020-07-24 RX ADMIN — PIPERACILLIN AND TAZOBACTAM 3.38 G: 3; .375 INJECTION, POWDER, FOR SOLUTION INTRAVENOUS at 00:11

## 2020-07-24 RX ADMIN — CLINDAMYCIN IN 5 PERCENT DEXTROSE 600 MG: 12 INJECTION, SOLUTION INTRAVENOUS at 03:18

## 2020-07-24 RX ADMIN — MORPHINE SULFATE 2 MG: 2 INJECTION, SOLUTION INTRAMUSCULAR; INTRAVENOUS at 14:26

## 2020-07-24 RX ADMIN — PROBENECID AND COLCHICINE 1 TABLET: 500; .5 TABLET ORAL at 09:54

## 2020-07-24 RX ADMIN — ONDANSETRON 4 MG: 2 INJECTION INTRAMUSCULAR; INTRAVENOUS at 12:59

## 2020-07-24 RX ADMIN — Medication 10 ML: at 21:36

## 2020-07-24 RX ADMIN — POTASSIUM CHLORIDE: 2 INJECTION, SOLUTION, CONCENTRATE INTRAVENOUS at 18:29

## 2020-07-24 NOTE — PROGRESS NOTES
Comprehensive Nutrition Assessment    Type and Reason for Visit: Reassess, Consult(TPN-Hospitalist)  This assessment was completed remotely. . Patient consent was obtained for remote assessment. Nutrition Recommendations/Plan:  TPN: Increase TPN of 15%DEX/5%AA to  2L/d at 85 ml/hr with 250 ml 20% Lipids daily  To provide: 1920 kcal/d (91% of needs), 100 grams of protein/d (95% of needs), 300 grams of CHO/d, ~2300 ml of total volume/d   Lytes/L of TPN:   Omit Na, Potassium 40 meq (10 meq KCl, 30 meq KPO4), 8 meq Mg, Omit Calcium  Other additives: MTE, MVI  IVF:  Discontinue IVF with new bag of TPN tonight. Adjust once TPN starts decrease to 83 ml/hr. Vitamin and Mineral Supplement Therapy:  Nutrition support orders for electrolyte management replacement are activated and placed on the MAR. ONS: Add Ensure Enlive tid and Magic cup bid  Labs:   BMP, Phos, Mg,in AM and MWF. POC Glucoses/SSI if Glucose > 180 mg/dl on AM BMP. Nutrition Assessment:   Nutrition History and Allergies: Pt reports hx of fall ~10 days ago with minimal mobility and oral intake since that time. He reports his family has been providing foods and beverages at best during this time he was able to consume [place] half a handful of food. \"  He c/o early satiety and abdominal fullness. He reports prior to this acute decline his overall intake had been declining for ~ 6 months. He has experienced wt loss of 4% over the past 2 months and a total of 8% over the past year based on current bed weight. This weight is likely falsely elevated secondary to edema. PMH remarkable for COPD, PVD, gout, CKD, and ETOH abuse   Admitted w/ severe sepsis . 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 PPN started 7/22 and changed to CPN on 7/23. Central line:  Double lumen PICC 7/22.   Remains TPN dependent for sacral/anal wound healing until able to consume >60% of needs from po diet.  Talked to pt and son via room phone. Pt loves the sweet tea and is only focused on receiving 2 or 3 glasses of that at all meals. He and son report pt ate a little eggs, Khmer toast and coffee at breakfast this morning but is unable to better quantify intake. Recorded intake by CNA of 5% for breakfast.   Pt reports appetite as \"not like it was at home\". Say he had a great appetite until until 1 week PTA which is not c/w prior report of decline in intake for the past 6 months. Pt says he was drinking a little Boost at home, and he will drink anything if it will help him to eat out of the hospital. He also loves ice cream and says he would eat supplemental ice cream as well. Pertinent labs:  Phos 5.5 after bolus yesterday for Phos of 1.7  K 4.6-current TPN provides ~60 meq K/d  Pt still at increased risk for refeeding. Corrected calcium ~10.5-current TPN provides ~4.5 meq Ca/d  BMP glucose 157 mg/dl-no need fot POC glucoses or SSI at this point. Suspect elevation may be partly r/t surgical stress. Na 145, Cl 117- receiving IVF of NS at 83 ml/hr and NaCl from Zosyn. Current TPN contains 30 meq Na/L. Could omit from TPN. Could progress TPN and discontinue IVF today    Malnutrition Assessment:  Malnutrition Status:  Severe malnutrition    Context:  Acute illness     Findings of the clinical characteristics of malnutrition:   Energy Intake:  7 - 50% or less of est energy requirements for 5 or more days  Weight Loss:         Body Fat Loss:  7 - Moderate body fat loss, Triceps   Muscle Mass Loss:  7 - Moderate muscle mass loss, Calf, Clavicles (pectoralis & deltoids), Hand (interosseous), Scapula (trapezius), Temples (temporalis)          Estimated Daily Nutrient Needs:  Energy (kcal):  1192-5259(30-35kcal/kg IBW 70kg secondary edema, hx CKD, wound)  Protein (g):  105-150(1.5-2 g/kg IBW d/t wnd)-GFR now >60  Fluid (ml/day):  1ml/kcal/day        Wounds:    S/P debridement of large sacral wound  down to coccygeal bone 7/23    Current Nutrition Therapies: Confirmed TPN rate with RN  Diet order: 7/23-start with clear liquids post-op and advance as tolerated to regular  Current Parenteral Nutrition Orders:  · Type and Formula: 15%DEX/ 5%AA   · Lipids: 250ml, Daily  · Duration: Continuous  · Rate/Volume: 1L/d at 42 ml/hr  · Current PN Order Provides: 1210 kcal/d (58% of needs), 60 grams of protein/d (57% of needs), 150 grams of CHO/d,and ~1250 ml of total volume/d . Anthropometric Measures:  · Height:  5' 8\" (172.7 cm)  · Current Body Wt:  81.6 kg (179 lb 14.3 oz) -stated  · Admission Body Wt:  180 lb(stated wt)    · Usual Body Wt:  195 lb     · Ideal Body Wt:  154:  116.8 %   · Body mass index is 27.37 kg/m². · BMI Categories:  Overweight (BMI 25.0-29. 9)     Edema:1+BLE    Nutrition Diagnosis:   · Severe malnutrition related to catabolic illness(wound), inadequate protein-energy intake as evidenced by malnutrition criteria as identified above      Nutrition Interventions:   Food and/or Nutrient Delivery: Modify parenteral nutrition, add oral nutritional supplement. Coordination of Nutrition Care: Discussed with Kiki Clemente RN    Goals:  Meet > 75 % estimated needs within 7 days       Nutrition Monitoring and Evaluation:   Food/Nutrient Intake Outcomes: Parenteral nutrition intake/tolerance, supplement and po intake. Physical Signs/Symptoms Outcomes: Biochemical data    Discharge Planning:     Too soon to determine     Electronically signed by Natalya Sandra RD on 7/24/2020 at 11:12 AM    Contact: [phone]

## 2020-07-24 NOTE — PROGRESS NOTES
Morphine 2mg given IVP for ongoing s/p debridement pain. Patient repositioned for comfort. Will continue to monitor.

## 2020-07-24 NOTE — PROGRESS NOTES
Problem: Pressure Injury - Risk of  Goal: *Prevention of pressure injury  Description: Document Luc Scale and appropriate interventions in the flowsheet. Outcome: Progressing Towards Goal  Note: Pressure Injury Interventions:  Sensory Interventions: Assess changes in LOC, Keep linens dry and wrinkle-free, Maintain/enhance activity level, Minimize linen layers    Moisture Interventions: Absorbent underpads, Apply protective barrier, creams and emollients, Minimize layers    Activity Interventions: Assess need for specialty bed, Increase time out of bed, Pressure redistribution bed/mattress(bed type)    Mobility Interventions: Assess need for specialty bed, HOB 30 degrees or less, Pressure redistribution bed/mattress (bed type), PT/OT evaluation    Nutrition Interventions: Document food/fluid/supplement intake    Friction and Shear Interventions: Apply protective barrier, creams and emollients, Foam dressings/transparent film/skin sealants, HOB 30 degrees or less, Lift team/patient mobility team, Minimize layers                Problem: Falls - Risk of  Goal: *Absence of Falls  Description: Document Robert Fall Risk and appropriate interventions in the flowsheet.   Outcome: Progressing Towards Goal  Note: Fall Risk Interventions:  Mobility Interventions: Bed/chair exit alarm, Communicate number of staff needed for ambulation/transfer, Patient to call before getting OOB, PT Consult for mobility concerns         Medication Interventions: Bed/chair exit alarm, Evaluate medications/consider consulting pharmacy, Patient to call before getting OOB, Teach patient to arise slowly    Elimination Interventions: Bed/chair exit alarm, Call light in reach, Patient to call for help with toileting needs    History of Falls Interventions: Door open when patient unattended, Evaluate medications/consider consulting pharmacy, Investigate reason for fall, Bed/chair exit alarm

## 2020-07-24 NOTE — PROGRESS NOTES
CM met with patient and patient's family, to discuss discharge planning. Patient's daughter Narciso Gonzalez feels the patient could benefit from [facility]. CM was receptive to this information. CM notified Narciso Gonzalez with [facility], of referral this day, per family request. CM also provided list of SNF's, if the patient is not approved for LTAC services. Please consult or notify CM if any needs shall arise. CM continues to follow.

## 2020-07-24 NOTE — PROGRESS NOTES
Problem: Pressure Injury - Risk of  Goal: *Prevention of pressure injury  Description: Document Luc Scale and appropriate interventions in the flowsheet. Outcome: Progressing Towards Goal  Note: Pressure Injury Interventions:  Sensory Interventions: Assess changes in LOC, Assess need for specialty bed, Avoid rigorous massage over bony prominences, Check visual cues for pain, Discuss PT/OT consult with provider, Keep linens dry and wrinkle-free, Maintain/enhance activity level, Minimize linen layers    Moisture Interventions: Absorbent underpads, Apply protective barrier, creams and emollients, Assess need for specialty bed, Check for incontinence Q2 hours and as needed, Internal/External urinary devices, Limit adult briefs, Maintain skin hydration (lotion/cream), Minimize layers, Moisture barrier, Offer toileting Q_hr    Activity Interventions: Assess need for specialty bed, Increase time out of bed, Pressure redistribution bed/mattress(bed type), PT/OT evaluation    Mobility Interventions: Assess need for specialty bed, HOB 30 degrees or less, Pressure redistribution bed/mattress (bed type), PT/OT evaluation    Nutrition Interventions: Document food/fluid/supplement intake    Friction and Shear Interventions: Apply protective barrier, creams and emollients, Foam dressings/transparent film/skin sealants, HOB 30 degrees or less, Lift sheet, Lift team/patient mobility team, Minimize layers                Problem: Patient Education: Go to Patient Education Activity  Goal: Patient/Family Education  Outcome: Progressing Towards Goal     Problem: Falls - Risk of  Goal: *Absence of Falls  Description: Document Robert Fall Risk and appropriate interventions in the flowsheet.   Outcome: Progressing Towards Goal  Note: Fall Risk Interventions:  Mobility Interventions: Bed/chair exit alarm, Communicate number of staff needed for ambulation/transfer, Patient to call before getting OOB, PT Consult for mobility concerns, PT Consult for assist device competence         Medication Interventions: Bed/chair exit alarm, Evaluate medications/consider consulting pharmacy, Patient to call before getting OOB, Teach patient to arise slowly    Elimination Interventions: Bed/chair exit alarm, Call light in reach, Patient to call for help with toileting needs, Stay With Me (per policy), Toilet paper/wipes in reach, Toileting schedule/hourly rounds    History of Falls Interventions: Door open when patient unattended, Evaluate medications/consider consulting pharmacy         Problem: Patient Education: Go to Patient Education Activity  Goal: Patient/Family Education  Outcome: Progressing Towards Goal     Problem: Nutrition Deficit  Goal: *Optimize nutritional status  Outcome: Progressing Towards Goal     Problem: Patient Education: Go to Patient Education Activity  Goal: Patient/Family Education  Outcome: Progressing Towards Goal

## 2020-07-24 NOTE — PROGRESS NOTES
Problem: Mobility Impaired (Adult and Pediatric)  Goal: *Acute Goals and Plan of Care (Insert Text)  Outcome: Progressing Towards Goal  Note: LTG:  (1.)Mr. Jannet Davidson will move from supine to sit and sit to supine , scoot up and down, and roll side to side in bed with STAND BY ASSIST within 7 treatment day(s). (2.)Mr. Jannet Davidson will transfer from bed to chair and chair to bed with CONTACT GUARD ASSIST using the least restrictive device within 7 treatment day(s). (3.)Mr. Jannet Davidson will ambulate with CONTACT GUARD ASSIST for 40 feet with the least restrictive device within 7 treatment day(s). (4.)Mr. Jannet Davidson will tolerate at least 8 min of dynamic standing activity to assist standing ADLs with the least restrictive device within 7 treatment days. PHYSICAL THERAPY: Daily Note and PM 7/24/2020  INPATIENT: PT Visit Days : 2  [place] / [place] / Product Type: Medicare /       NAME/AGE/GENDER: Bradly Elizondo is a 81 y.o. male   PRIMARY DIAGNOSIS: Severe sepsis ([facility] [address] [A41.9, R65.20] Severe sepsis ([facility] [address] Severe sepsis (HCC)  Procedure(s) (LRB):  SACRAL WOUND DEBRIDEMENT (N/A)  1 Day Post-Op  ICD-10: Treatment Diagnosis:    · Generalized Muscle Weakness (M62.81)  · Other lack of cordination (R27.8)  · Difficulty in walking, Not elsewhere classified (R26.2)  · History of falling (Z91.81)   Precaution/Allergies:  Aspirin and Prednisone      ASSESSMENT:     Mr. Jannet Davidson is an 81year old M who presents for severe sepsis. Prior to hospital admission pt lives with his adult grandson in 1 story home with 3 step(s) to enter to backdoor. Pt endorses 1 fall in past 6 months. Prior to admission Mr. Jannet Davidson uses a RW for mobility. Patient was supine upon contact and agreeable to PT. Patient performs supine to sit with max assist, additional time, and cues for proper technique. Patient had more difficulty due to wound bed.  Patient sits EOB where he performs transfer training x 3 reps needing min assist and cues for proper technique. Once standing patient demonstrates fair static standing balance. Patient tolerates static standing for ~2-3 minutes each rep. On last sit to stand rep patient ambulates 2' along the head of the bed with rolling walker, min assist for balance and cues for sequencing with rolling walker. Patient returns to supine with mod assist and cues for technique. Overall slow progress towards physical therapy goals. Patient's goals listed above are still appropriate. Will continue skilled PT to address remaining deficits. This section established at most recent assessment   PROBLEM LIST (Impairments causing functional limitations):  1. Decreased Strength  2. Decreased ADL/Functional Activities  3. Decreased Transfer Abilities  4. Decreased Ambulation Ability/Technique  5. Decreased Balance  6. Increased Pain   INTERVENTIONS PLANNED: (Benefits and precautions of physical therapy have been discussed with the patient.)  1. Balance Exercise  2. Bed Mobility  3. Electrical Stimulation  4. Family Education  5. Gait Training  6. Manual Therapy  7. Neuromuscular Re-education/Strengthening  8. Range of Motion (ROM)  9. Therapeutic Activites  10. Therapeutic Exercise/Strengthening  11. Transfer Training     TREATMENT PLAN: Frequency/Duration: 3 times a week for duration of hospital stay  Rehabilitation Potential For Stated Goals: Good     REHAB RECOMMENDATIONS (at time of discharge pending progress):    Placement: It is my opinion, based on this patient's performance to date, that Mr. Iris Noguera may benefit from intensive therapy at a [address] [place] [address] after discharge due to the functional deficits listed above that are likely to improve with skilled rehabilitation and concerns that he/she may be unsafe to be unsupervised at home due to fall history and poor  functional mobility .   Equipment:    None at this time              HISTORY:   History of Present Injury/Illness (Reason for Referral):  See H&P  Past Medical History/Comorbidities:   Mr. Jannet Davidson  has a past medical history of Atherosclerosis of native arteries of the extremities with intermittent claudication (5/13/2014), Chronic kidney disease, Foot ulcer ([facility] [address] (9/29/2017), Former cigarette smoker (5/13/2014), Gout, HTN (hypertension) (5/13/2014), Hypercholesterolemia, Hyperlipidemia (5/13/2014), and Peripheral vascular disease ([facility] [address] Mr. Jannet Davidson  has a past surgical history that includes hx hemorrhoidectomy; hx malignant skin lesion excision; and hx colonoscopy. Social History/Living Environment:   Home Environment: Private residence  One/Two Story Residence: One story  Living Alone: No  Support Systems: Family member(s)  Patient Expects to be Discharged to[de-identified] Unknown  Current DME Used/Available at Home: Oxygen, portable, Shower chair, Walker, rollator  Prior Level of Function/Work/Activity:  Mod I household ambulator w/ RW     Number of Personal Factors/Comorbidities that affect the Plan of Care: 1-2: MODERATE COMPLEXITY   EXAMINATION:   Most Recent Physical Functioning:   Gross Assessment:                  Posture:     Balance:  Sitting: Intact  Standing: Impaired  Standing - Static: Fair  Standing - Dynamic : Fair Bed Mobility:  Supine to Sit: Maximum assistance  Sit to Supine: Moderate assistance  Wheelchair Mobility:     Transfers:  Sit to Stand: Minimum assistance  Stand to Sit: Minimum assistance  Gait:     Base of Support: Widened  Speed/She: Slow;Shuffled  Step Length: Left shortened;Right shortened  Distance (ft): 2 Feet (ft)  Assistive Device: Walker, rolling  Ambulation - Level of Assistance: Minimal assistance  Interventions: Safety awareness training; Tactile cues; Verbal cues      Body Structures Involved:  1. Bones  2. Joints  3. Muscles Body Functions Affected:  1. Neuromusculoskeletal  2. Movement Related  3. Skin Related Activities and Participation Affected:  1. Learning and Applying Knowledge  2.  General Tasks and Demands  3. Communication  4. Mobility  5. Self Care  6. Domestic Life  7. Interpersonal Interactions and Relationships   Number of elements that affect the Plan of Care: 3: MODERATE COMPLEXITY   CLINICAL PRESENTATION:   Presentation: Stable and uncomplicated: LOW COMPLEXITY   CLINICAL DECISION MAKIN [facility] [id number] AM-PAC 6 Clicks   Basic Mobility Inpatient Short Form  How much difficulty does the patient currently have. .. Unable A Lot A Little None   1. Turning over in bed (including adjusting bedclothes, sheets and blankets)? [] 1   [] 2   [x] 3   [] 4   2. Sitting down on and standing up from a chair with arms ( e.g., wheelchair, bedside commode, etc.)   [] 1   [x] 2   [] 3   [] 4   3. Moving from lying on back to sitting on the side of the bed? [] 1   [x] 2   [] 3   [] 4   How much help from another person does the patient currently need. .. Total A Lot A Little None   4. Moving to and from a bed to a chair (including a wheelchair)? [] 1   [x] 2   [] 3   [] 4   5. Need to walk in hospital room? [] 1   [x] 2   [] 3   [] 4   6. Climbing 3-5 steps with a railing? [] 1   [x] 2   [] 3   [] 4   © , Trustees of [address] [place] [address] [address] [address], under license to [facility].. All rights reserved      Score:  Initial: 13 Most Recent: X (Date: -- )    Interpretation of Tool:  Represents activities that are increasingly more difficult (i.e. Bed mobility, Transfers, Gait). Medical Necessity:     · Skilled intervention continues to be required due to poor functional mobility.   Reason for Services/Other Comments:  ·    Use of outcome tool(s) and clinical judgement create a POC that gives a: Clear prediction of patient's progress: LOW COMPLEXITY            TREATMENT:   (In addition to Assessment/Re-Assessment sessions the following treatments were rendered)   Pre-treatment Symptoms/Complaints:  See above  Pain: Initial:   Pain Intensity 1: 0  Post Session:  0     Therapeutic Activity: (    27 Minutes): Therapeutic activities including bed mobility training, transfer training, ambulation on level ground, static/dynamic sitting/standing balance, scooting, posture training, instruction in sequencing with rolling walker and patient education to improve mobility, strength, balance, and coordination. Required moderate Safety awareness training; Tactile cues; Verbal cues to promote static and dynamic balance in standing and promote coordination of bilateral, lower extremity(s). Braces/Orthotics/Lines/Etc:   · IV  · leyva catheter  · O2 Device: Room air  Treatment/Session Assessment:    · Response to Treatment:  See above  · Interdisciplinary Collaboration:   o Physical Therapy Assistant  o Registered Nurse  · After treatment position/precautions:   o Supine in bed  o Bed alarm/tab alert on  o Bed/Chair-wheels locked  o Bed in low position  o Call light within reach  o RN notified   · Compliance with Program/Exercises: Will assess as treatment progresses  · Recommendations/Intent for next treatment session: \"Next visit will focus on advancements to more challenging activities\".   Total Treatment Duration:  PT Patient Time In/Time Out  Time In: 1145  Time Out: 6897 [facility]

## 2020-07-24 NOTE — PROGRESS NOTES
Hospitalist Progress Note     Admit Date:  2020  3:20 PM   Name:  Garth Maldonado   Age:  81 y.o.  :  1935   MRN:  [id number]   PCP:  Natalie Shelton MD  Treatment Team: Attending Provider: Rinku Wood MD; Consulting Provider: Monae Robertson MD; Consulting Provider: Martha Wilks MD; Utilization Review: Carmen Chery; Care Manager: Macario Fuller; Primary Nurse: Tiffany Handy    Subjective:   Patient is an 82yo M with a history of COPD, PAD, tophaceous gout, and alcohol abuse who presented to ER for sacral sore worsening for two weeks. 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 Surgery consulted who performed debridement on . Confirmed presence of fistula and osteomyelitis. Wound care consulted. PICC in place for TPN, nutrition following.       Pain with sacral wound dressing change. Continues TPN, taking some PO. Nutrition following. No fevers, still tachy at times.      They are aware objective:     Patient Vitals for the past 24 hrs:   Temp Pulse Resp BP SpO2   20 1224 97.3 °F (36.3 °C) 93 20 133/80 99 %   20 1100     100 %   20 0804 97.7 °F (36.5 °C) (!) 103 20 90/73 93 %   20 0423 99 °F (37.2 °C) (!) 108 19 102/59 98 %   202 98.9 °F (37.2 °C) (!) 110 17 105/61 96 %   20 98.6 °F (37 °C) 95 19 108/59 99 %   20 1938  100 20  99 %   20 1934  96 17  99 %   20 1921 98.2 °F (36.8 °C) 97 18 126/64 99 %   20 1915  98 14 134/62 98 %   20 1901  100 18  98 %   20 1900  100  134/62 98 %   20 1850  100 19 126/61 94 %   20 1846  (!) 101 25 130/62 96 %   20 1841  (!) 102 21 115/60 96 %   20 1836  (!) 102 23 119/63 94 %   20 183  (!) 101 24 123/60 94 %   20 1826  (!) 103 16 124/60 92 %   20 182  (!) 101 24 117/57 95 %   20 1817 98 °F (36.7 °C) (!) 106 16 115/65 95 %   20 181  (!) 105  118/59 96 %   07/23/20 1811  (!) 106 14 115/56 94 %   07/23/20 1810  95   94 %   07/23/20 1622 97.7 °F (36.5 °C) 99 18 135/70 97 %     Oxygen Therapy  O2 Sat (%): 99 % (07/24/20 1224)  Pulse via Oximetry: 93 beats per minute (07/24/20 1100)  O2 Device: Nasal cannula (07/24/20 1100)  O2 Flow Rate (L/min): 2 l/min (07/24/20 1100)    Intake/Output Summary (Last 24 hours) at 7/24/2020 1620  Last data filed at 7/24/2020 1440  Gross per 24 hour   Intake 1160 ml   Output 1259 ml   Net -99 ml         REVIEW OF SYSTEMS: Comprehensive ROS performed and negative except as stated in HPI. Physical Examination:  General:    Some discomfort from sacral wound  Head:  NCAT  CV:   RRR  Lungs:   CTAB, no distress on RA  Abdomen:   +BS, soft, NTND  Extremities: tophi , no obvious acute gout  Skin:     No petechia or purpura  Neuro:  alert      Data Review:  I have reviewed all labs, meds, telemetry events, and studies from the last 24 hours. Recent Results (from the past 24 hour(s))   CBC WITH AUTOMATED DIFF    Collection Time: 07/24/20  4:38 AM   Result Value Ref Range    WBC 12.5 (H) 4.3 - 11.1 K/uL    RBC 2.89 (L) 4.23 - 5.6 M/uL    HGB 9.6 (L) 13.6 - 17.2 g/dL    HCT 29.0 (L) 41.1 - 50.3 %    .3 (H) 79.6 - 97.8 FL    MCH 33.2 (H) 26.1 - 32.9 PG    MCHC 33.1 31.4 - 35.0 g/dL    RDW 14.6 11.9 - 14.6 %    PLATELET 686 471 - 432 K/uL    MPV 8.7 (L) 9.4 - 12.3 FL    ABSOLUTE NRBC 0.00 0.0 - 0.2 K/uL    NEUTROPHILS 79 (H) 43 - 78 %    LYMPHOCYTES 7 (L) 13 - 44 %    MONOCYTES 7 4.0 - 12.0 %    EOSINOPHILS 0 (L) 0.5 - 7.8 %    BASOPHILS 0 0.0 - 2.0 %    IMMATURE GRANULOCYTES 7 (H) 0.0 - 5.0 %    ABS. NEUTROPHILS 9.8 (H) 1.7 - 8.2 K/UL    ABS. LYMPHOCYTES 0.9 0.5 - 4.6 K/UL    ABS. MONOCYTES 0.9 0.1 - 1.3 K/UL    ABS. EOSINOPHILS 0.0 0.0 - 0.8 K/UL    ABS. BASOPHILS 0.0 0.0 - 0.2 K/UL    ABS. IMM.  GRANS. 0.9 (H) 0.0 - 0.5 K/UL    RBC COMMENTS NORMOCYTIC/NORMOCHROMIC      WBC COMMENTS Result Confirmed By Smear      PLATELET COMMENTS ADEQUATE      DF AUTOMATED     PHOSPHORUS    Collection Time: 07/24/20  4:38 AM   Result Value Ref Range    Phosphorus 5.5 (H) 2.3 - 3.7 MG/DL   MAGNESIUM    Collection Time: 07/24/20  4:38 AM   Result Value Ref Range    Magnesium 2.3 1.8 - 2.4 mg/dL   METABOLIC PANEL, COMPREHENSIVE    Collection Time: 07/24/20  4:38 AM   Result Value Ref Range    Sodium 145 136 - 145 mmol/L    Potassium 4.6 3.5 - 5.1 mmol/L    Chloride 117 (H) 98 - 107 mmol/L    CO2 22 21 - 32 mmol/L    Anion gap 6 (L) 7 - 16 mmol/L    Glucose 157 (H) 65 - 100 mg/dL    BUN 20 8 - 23 MG/DL    Creatinine 1.02 0.8 - 1.5 MG/DL    GFR est AA >60 >60 ml/min/1.73m2    GFR est non-AA >60 >60 ml/min/1.73m2    Calcium 8.3 8.3 - 10.4 MG/DL    Bilirubin, total 0.2 0.2 - 1.1 MG/DL    ALT (SGPT) 41 12 - 65 U/L    AST (SGOT) 69 (H) 15 - 37 U/L    Alk.  phosphatase 131 50 - 136 U/L    Protein, total 6.4 6.3 - 8.2 g/dL    Albumin 1.2 (L) 3.2 - 4.6 g/dL    Globulin 5.2 (H) 2.3 - 3.5 g/dL    A-G Ratio 0.2 (L) 1.2 - 3.5     PLEASE READ & DOCUMENT PPD TEST IN 24 HRS    Collection Time: 07/24/20 11:12 AM   Result Value Ref Range    PPD Indeterminate Negative    mm Induration 0 0 - 5 0 mm        All Micro Results     Procedure Component Value Units Date/Time    CULTURE, BLOOD [[id number]] Collected:  07/21/20 2015    Order Status:  Completed Specimen:  Blood Updated:  07/24/20 1317     Special Requests: --        LEFT  Antecubital       Culture result: NO GROWTH 3 DAYS       CULTURE, URINE [[id number]] Collected:  07/21/20 1815    Order Status:  Canceled Specimen:  Cath Urine           Current Meds:  Current Facility-Administered Medications   Medication Dose Route Frequency    Vancomycin Trough Level Reminder   Other ONCE    TPN ADULT-CENTRAL - dextrose 15% amino acid 5%   IntraVENous QPM    ondansetron (ZOFRAN) injection 4 mg  4 mg IntraVENous Q4H PRN    TPN ADULT-CENTRAL - dextrose 15% amino acid 5%   IntraVENous QPM    fat emulsion 20% (LIPOSYN, INTRAlipid) infusion 250 mL  250 mL IntraVENous QPM    vancomycin (VANCOCIN) 1,000 mg in 0.9% sodium chloride (MBP/ADV) 250 mL  1,000 mg IntraVENous Q24H    glycopyrrolate-formoterol (BEVESPI AEROSPHERE) 9 mcg-4.8 mcg inhaler  2 Puff Inhalation BID RT    sodium hypochlorite (QUARTER STRENGTH DAKIN'S) 0.125% irrigation (bottle)   Topical BID    NUTRITIONAL SUPPORT ELECTROLYTE PRN ORDERS   Does Not Apply PRN    0.9% sodium chloride infusion  83 mL/hr IntraVENous CONTINUOUS    central line flush (saline) syringe 20 mL  20 mL InterCATHeter Q8H    sodium chloride (NS) flush 5-40 mL  5-40 mL IntraVENous Q8H    sodium chloride (NS) flush 5-40 mL  5-40 mL IntraVENous PRN    enoxaparin (LOVENOX) injection 40 mg  40 mg SubCUTAneous Q24H    [Held by provider] clopidogreL (PLAVIX) tablet 75 mg  75 mg Oral DAILY    albuterol (PROVENTIL VENTOLIN) nebulizer solution 2.5 mg  2.5 mg Nebulization Q4H PRN    [Held by provider] dilTIAZem ER (CARDIZEM CD) capsule 120 mg  120 mg Oral DAILY    probenecid-colchicine (ColBenemid) tablet 1 Tab (Patient Supplied)  1 Tab Oral DAILY    piperacillin-tazobactam (ZOSYN) 3.375 g in 0.9% sodium chloride (MBP/ADV) 100 mL  3.375 g IntraVENous Q8H    thiamine (B-1) 100 mg in 0.9% sodium chloride 50 mL IVPB  100 mg IntraVENous DAILY    LORazepam (ATIVAN) tablet 1 mg  1 mg Oral Q6H PRN    morphine injection 2 mg  2 mg IntraVENous Q4H PRN    acetaminophen (TYLENOL) tablet 650 mg  650 mg Oral Q4H PRN       Diet:  DIET NUTRITIONAL SUPPLEMENTS  DIET NUTRITIONAL SUPPLEMENTS  DIET MECHANICAL SOFT    Other Studies (last 24 hours):  No results found.     Assessment and Plan:     Hospital Problems as of 7/24/2020 Date Reviewed: 7/20/2020          Codes Class Noted - Resolved POA    Hypomagnesemia ICD-10-CM: E83.42  ICD-9-CM: 275.2  7/22/2020 - Present Unknown        * (Principal) Severe sepsis ([facility] [address] ICD-10-CM: A41.9, R65.20  ICD-9-CM: 038.9, 995.92  7/21/2020 - Present Unknown        Pressure injury of deep tissue of sacral region ICD-10-CM: L89.156  ICD-9-CM: 707.03, 707.20  7/21/2020 - Present Unknown        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-CM: K60.4  ICD-9-CM: 565.1  7/21/2020 - Present Unknown        Hyponatremia ICD-10-CM: E87.1  ICD-9-CM: 276.1  7/21/2020 - Present Unknown        Hypokalemia ICD-10-CM: E87.6  ICD-9-CM: 276.8  7/21/2020 - Present Unknown        Acute kidney injury (MARY) with acute tubular necrosis (ATN) (HCC) ICD-10-CM: N17.0  ICD-9-CM: 584.5  7/21/2020 - Present Unknown        Chronic hypoxemic respiratory failure (HCC) ICD-10-CM: J96.11  ICD-9-CM: 518.83, 799.02  7/21/2020 - Present Unknown        Normocytic anemia ICD-10-CM: D64.9  ICD-9-CM: 285.9  7/21/2020 - Present Unknown        Moderate protein malnutrition (HCC) ICD-10-CM: E44.0  ICD-9-CM: 263.0  7/21/2020 - Present Unknown        Chronic obstructive pulmonary disease (HCC) ICD-10-CM: J44.9  ICD-9-CM: 496  6/22/2020 - Present Yes        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  ICD-9-CM: 274.03  5/18/2018 - Present Unknown        Atherosclerosis of native artery of extremity with intermittent claudication (HCC) (Chronic) ICD-10-CM: E63.749  ICD-9-CM: 440.21  5/13/2014 - Present Yes        Former cigarette smoker ICD-10-CM: N60.307  ICD-9-CM: V15.82  5/13/2014 - Present Yes        HTN (hypertension) (Chronic) ICD-10-CM: I10  ICD-9-CM: 401.9  5/13/2014 - Present Yes              A/P:    1. Severe sepsis secondary to sacral decubitus/rectocutaneous fistula. Continue vanc/zosyn. S/p debridement. Vitals/MARY improved     2. Acute kidney injury - resolved      3. Hyponatremia hypokalemia. Resolvedrepleted     4. 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 Significant protein and total calorie malnutritionappreciate nutrition managementTPN and monitor intake     6.  alcohol abuse - thiamin/folate, change to PO. PRN ativan if withdrawal signs    7. Peripheral vascular disease continue home Plavixhave held Pletal for now. This is unchanged  8. History of oxygen dependent COPDcontinue PRN albuterol and oxygen as needed. Continuing with substitutes for home meds. 9.  History of hypertension--Home Lasix held. Diltiazem held slowly resume pending clinical course. Discharge planning: Skilled nursing facility/LTAC  DVT ppx: Lovenox     Code status: Full code  Decision Maker: Selfoldest child is Paola Link [phone]      Signed:  Geeta DANIELSON

## 2020-07-24 NOTE — PROGRESS NOTES
7/24/20        PLAN:  Care Management per Hospitalist  Continue to Hold Plavix  Planning to return to OR Monday for diverting colostomy  Pt will likely require additional wound debridement in future    ASSESSMENT:  Admit Date: 7/21/2020   1 Day Post-Op  Procedure(s):  SACRAL WOUND DEBRIDEMENT    Principal Problem:    Severe sepsis ([address] (7/21/2020)    Active Problems:    Atherosclerosis of native artery of extremity with intermittent claudication ([address] (5/13/2014)      Former cigarette smoker (5/13/2014)      HTN (hypertension) (5/13/2014)      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)      Chronic obstructive pulmonary disease ([address] (6/22/2020)      Pressure injury of deep tissue of sacral region (7/21/2020)      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)      Hyponatremia (7/21/2020)      Hypokalemia (7/21/2020)      Acute kidney injury (MARY) with acute tubular necrosis (ATN) ([address] (7/21/2020)      Chronic hypoxemic respiratory failure ([address] (7/21/2020)      Normocytic anemia (7/21/2020)      Moderate protein malnutrition ([address] (7/21/2020)      Hypomagnesemia (7/22/2020)         SUBJECTIVE:  Pt awake in bed. Dr. Bianca Chandler discussed recommendation of diverting colostomy with pt to allow for sacral wound healing. Pt in agreement to proceed with diverting colostomy. AF, NAD. OBJECTIVE:  Constitutional: Alert oriented cooperative patient in no acute distress; appears stated age   Visit Vitals  /80 (BP 1 Location: Left arm, BP Patient Position: At rest)   Pulse 93   Temp 97.3 °F (36.3 °C)   Resp 20   Ht 5' 8\" (1.727 m)   Wt 180 lb (81.6 kg)   SpO2 99%   BMI 27.37 kg/m²     Eyes: Sclera are clear. ENMT: no external lesions gross hearing normal; no obvious neck masses, no ear or lip lesions  CV: Tachy. Normal perfusion  Resp: No JVD. Breathing is  non-labored; no audible wheezing.     GI: soft and non-distended : Condom Cath - jesús urine  Integument: dressing intact  Musculoskeletal: No embolic signs or cyanosis. Neuro: no focal deficits  Psychiatric: normal affect and mood, no memory impairment      Patient Vitals for the past 24 hrs:   BP Temp Pulse Resp SpO2   07/24/20 1224 133/80 97.3 °F (36.3 °C) 93 20 99 %   07/24/20 1100     100 %   07/24/20 0804 90/73 97.7 °F (36.5 °C) (!) 103 20 93 %   07/24/20 0423 102/59 99 °F (37.2 °C) (!) 108 19 98 %   07/23/20 2242 105/61 98.9 °F (37.2 °C) (!) 110 17 96 %   07/23/20 2018 108/59 98.6 °F (37 °C) 95 19 99 %   07/23/20 1938   100 20 99 %   07/23/20 1934   96 17 99 %   07/23/20 1921 126/64 98.2 °F (36.8 °C) 97 18 99 %   07/23/20 1915 134/62  98 14 98 %   07/23/20 1901   100 18 98 %   07/23/20 1900 134/62  100  98 %   07/23/20 1850 126/61  100 19 94 %   07/23/20 1846 130/62  (!) 101 25 96 %   07/23/20 1841 115/60  (!) 102 21 96 %   07/23/20 1836 119/63  (!) 102 23 94 %   07/23/20 1831 123/60  (!) 101 24 94 %   07/23/20 1826 124/60  (!) 103 16 92 %   07/23/20 1821 117/57  (!) 101 24 95 %   07/23/20 1817 115/65 98 °F (36.7 °C) (!) 106 16 95 %   07/23/20 1816 118/59  (!) 105  96 %   07/23/20 1811 115/56  (!) 106 14 94 %   07/23/20 1810   95  94 %     Labs:    Recent Labs     07/24/20  0438  07/21/20 2015   WBC 12.5*   < >  --    HGB 9.6*   < >  --       < >  --       < >  --    K 4.6   < >  --    *   < >  --    CO2 22   < >  --    BUN 20   < >  --    CREA 1.02   < >  --    *   < >  --    PTP  --   --  13.8   INR  --   --  1.0   APTT  --   --  35.0   TBILI 0.2   < >  --    ALT 41   < >  --       < >  --     < > = values in this interval not displayed.          Anne-Marie Greene, NP

## 2020-07-24 NOTE — WOUND CARE
Pt seen for sacral wound dressing change. Dr. Aram Schuster performed surgical debridement of sacral wound yesterday and confirmed with Rachel Omalley wound care to change dressing today and update orders. Removed wet to dry packing, Dr. Theodora Andrade in to visualize wound as well. Noted coccyx bone prominent in wound bed, slough still in about 80 percent of the wound. Periwound with still some areas of sloughing and blanchable erythema concern for further breakdown of those areas. Wound measuring 5 X 6 X 3.5 cm. Cleansed with dermal wound cleanser and packed with dakins soaked gauze. Recommend to continue dakins due to slough still present in wound bed. Pt turned with pillows to right side. Will update orders and wound care will continue to follow while in acute care setting.

## 2020-07-24 NOTE — PROGRESS NOTES
Interdisciplinary Rounds completed. Nursing, Case Management, and Physician  present. Plan of care reviewed and updated. [facility] may be the most appropriate placement at discharge.

## 2020-07-24 NOTE — PROGRESS NOTES
Reviewed notes prior to visit for spiritual concerns  Will continue to follow as needed during this admission    Patient is resting presently        Isabel Ewing, staff

## 2020-07-24 NOTE — OP NOTES
[facility]  OPERATIVE REPORT    Name:  Seth Zazueta  MR#:  [id number]  :  1935  ACCOUNT #:  [de-identified]  DATE OF SERVICE:  2020    PREOPERATIVE DIAGNOSIS:  Infected sacral wound. POSTOPERATIVE DIAGNOSES:  1. Infected sacral wound with osteomyelitis. 2.  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 PROCEDURES PERFORMED:  1. Examination under anesthesia of the anus. 2.  Sacral wound debridement excisional down to the coccygeal bone. SURGEON:  Joao Hamlin MD    ANESTHESIA:  general    COMPLICATIONS:  none    SPECIMENS REMOVED: as above    IMPLANTS: packing    ESTIMATED BLOOD LOSS:  Minimal.    INDICATIONS:  This is an 69-year-old gentleman presented with multiple medical issues. He has a sacral wound, which was clearly infected, also there was concern whether he has a fistula versus stool contamination. Surgery offered to him to debride the wound first and the patient understood risks and benefits and agreed to proceed. FINDINGS:  1. He does have anal fistula, which drained from anal canal into the sacral wound. This appeared to be high fistula involving the anal sphincter. 2.  His sacral wound clearly infected with muscle, fascia in the coccygeal bone all involved. PROCEDURE:  After informed consent obtained, the patient brought into the operating room. General anesthesia was administered. The patient then placed on prone position. His sacral area was prepped with Betadine and then draped in routine fashion and the examination of the sacral wound immediately brought my attention into two openings at the inferior part of the wound and the distal rectal exam performed. These two openings clearly lead into the anal canal.  The wound was probed and basically confirmed the anal fistula, at least there are two fistulas, both have opening into the lower portion of the sacral wound.   Then, I decided to leave the fistula alone at this time and then I moved forward to debride his sacral wound. This was done with Bovie and necrotic skin, subcutaneous tissue, muscle, and fascia all debrided and then pus was identified around his coccygeal bones, actually part of the coccygeal bone already detaching itself. This was removed and the pus was drained and then good hemostasis obtained and then the wound was packed with wet-to-dry dressing. The patient tolerated the procedure well, transferred to recovery room in stable condition. All the instrument count and lap count were correct.       Kady Nelson MD      [id number]  D:  07/23/2020 18:30  T:  07/24/2020 1:26  JOB #:  [id number]  CC:

## 2020-07-25 ENCOUNTER — ANESTHESIA EVENT (OUTPATIENT)
Dept: SURGERY | Age: 85
DRG: 853 | End: 2020-07-25
Payer: MEDICARE

## 2020-07-25 LAB
ALBUMIN SERPL-MCNC: 1.2 G/DL (ref 3.2–4.6)
ALBUMIN/GLOB SERPL: 0.2 {RATIO} (ref 1.2–3.5)
ALP SERPL-CCNC: 163 U/L (ref 50–136)
ALT SERPL-CCNC: 83 U/L (ref 12–65)
ANION GAP SERPL CALC-SCNC: 7 MMOL/L (ref 7–16)
AST SERPL-CCNC: 128 U/L (ref 15–37)
BILIRUB SERPL-MCNC: 0.1 MG/DL (ref 0.2–1.1)
BUN SERPL-MCNC: 20 MG/DL (ref 8–23)
CALCIUM SERPL-MCNC: 8.9 MG/DL (ref 8.3–10.4)
CHLORIDE SERPL-SCNC: 114 MMOL/L (ref 98–107)
CO2 SERPL-SCNC: 19 MMOL/L (ref 21–32)
CREAT SERPL-MCNC: 0.87 MG/DL (ref 0.8–1.5)
GLOBULIN SER CALC-MCNC: 5.7 G/DL (ref 2.3–3.5)
GLUCOSE SERPL-MCNC: 119 MG/DL (ref 65–100)
MAGNESIUM SERPL-MCNC: 2.1 MG/DL (ref 1.8–2.4)
MM INDURATION POC: NORMAL (ref 0–5)
PHOSPHATE SERPL-MCNC: 4 MG/DL (ref 2.3–3.7)
POTASSIUM SERPL-SCNC: 4.5 MMOL/L (ref 3.5–5.1)
PPD POC: NORMAL
PROT SERPL-MCNC: 6.9 G/DL (ref 6.3–8.2)
SODIUM SERPL-SCNC: 140 MMOL/L (ref 136–145)

## 2020-07-25 PROCEDURE — 74011250637 HC RX REV CODE- 250/637: Performed by: FAMILY MEDICINE

## 2020-07-25 PROCEDURE — 36415 COLL VENOUS BLD VENIPUNCTURE: CPT

## 2020-07-25 PROCEDURE — 83735 ASSAY OF MAGNESIUM: CPT

## 2020-07-25 PROCEDURE — 77010033678 HC OXYGEN DAILY

## 2020-07-25 PROCEDURE — 94640 AIRWAY INHALATION TREATMENT: CPT

## 2020-07-25 PROCEDURE — 74011000250 HC RX REV CODE- 250: Performed by: FAMILY MEDICINE

## 2020-07-25 PROCEDURE — 74011250636 HC RX REV CODE- 250/636: Performed by: INTERNAL MEDICINE

## 2020-07-25 PROCEDURE — 84100 ASSAY OF PHOSPHORUS: CPT

## 2020-07-25 PROCEDURE — 74011000250 HC RX REV CODE- 250: Performed by: INTERNAL MEDICINE

## 2020-07-25 PROCEDURE — 74011250636 HC RX REV CODE- 250/636: Performed by: FAMILY MEDICINE

## 2020-07-25 PROCEDURE — 80053 COMPREHEN METABOLIC PANEL: CPT

## 2020-07-25 PROCEDURE — 65660000000 HC RM CCU STEPDOWN

## 2020-07-25 PROCEDURE — 94760 N-INVAS EAR/PLS OXIMETRY 1: CPT

## 2020-07-25 PROCEDURE — 74011000258 HC RX REV CODE- 258: Performed by: INTERNAL MEDICINE

## 2020-07-25 PROCEDURE — 74011000258 HC RX REV CODE- 258: Performed by: FAMILY MEDICINE

## 2020-07-25 PROCEDURE — 74011250637 HC RX REV CODE- 250/637: Performed by: INTERNAL MEDICINE

## 2020-07-25 PROCEDURE — 36593 DECLOT VASCULAR DEVICE: CPT

## 2020-07-25 RX ADMIN — I.V. FAT EMULSION 250 ML: 20 EMULSION INTRAVENOUS at 18:17

## 2020-07-25 RX ADMIN — PIPERACILLIN AND TAZOBACTAM 3.38 G: 3; .375 INJECTION, POWDER, FOR SOLUTION INTRAVENOUS at 09:57

## 2020-07-25 RX ADMIN — Medication 20 ML: at 05:24

## 2020-07-25 RX ADMIN — GLYCOPYRROLATE AND FORMOTEROL FUMARATE 2 PUFF: 9; 4.8 AEROSOL, METERED RESPIRATORY (INHALATION) at 20:33

## 2020-07-25 RX ADMIN — WATER 1 MG: 1 INJECTION INTRAMUSCULAR; INTRAVENOUS; SUBCUTANEOUS at 17:09

## 2020-07-25 RX ADMIN — VANCOMYCIN HYDROCHLORIDE 1000 MG: 1 INJECTION, POWDER, LYOPHILIZED, FOR SOLUTION INTRAVENOUS at 16:56

## 2020-07-25 RX ADMIN — WATER 1 MG: 1 INJECTION INTRAMUSCULAR; INTRAVENOUS; SUBCUTANEOUS at 16:52

## 2020-07-25 RX ADMIN — ENOXAPARIN SODIUM 40 MG: 40 INJECTION SUBCUTANEOUS at 22:16

## 2020-07-25 RX ADMIN — PIPERACILLIN AND TAZOBACTAM 3.38 G: 3; .375 INJECTION, POWDER, FOR SOLUTION INTRAVENOUS at 17:01

## 2020-07-25 RX ADMIN — Medication 10 ML: at 13:05

## 2020-07-25 RX ADMIN — Medication 20 ML: at 13:04

## 2020-07-25 RX ADMIN — MORPHINE SULFATE 2 MG: 2 INJECTION, SOLUTION INTRAMUSCULAR; INTRAVENOUS at 16:51

## 2020-07-25 RX ADMIN — Medication 10 ML: at 05:24

## 2020-07-25 RX ADMIN — FOLIC ACID 1 MG: 1 TABLET ORAL at 09:57

## 2020-07-25 RX ADMIN — Medication 10 ML: at 22:19

## 2020-07-25 RX ADMIN — DAKIN'S SOLUTION 0.125% (QUARTER STRENGTH): 0.12 SOLUTION at 10:00

## 2020-07-25 RX ADMIN — MAGNESIUM SULFATE HEPTAHYDRATE: 500 INJECTION, SOLUTION INTRAMUSCULAR; INTRAVENOUS at 18:17

## 2020-07-25 RX ADMIN — PROBENECID AND COLCHICINE 1 TABLET: 500; .5 TABLET ORAL at 10:01

## 2020-07-25 RX ADMIN — PIPERACILLIN AND TAZOBACTAM 3.38 G: 3; .375 INJECTION, POWDER, FOR SOLUTION INTRAVENOUS at 00:47

## 2020-07-25 RX ADMIN — DAKIN'S SOLUTION 0.125% (QUARTER STRENGTH): 0.12 SOLUTION at 17:10

## 2020-07-25 RX ADMIN — Medication 100 MG: at 09:59

## 2020-07-25 RX ADMIN — PIPERACILLIN AND TAZOBACTAM 3.38 G: 3; .375 INJECTION, POWDER, FOR SOLUTION INTRAVENOUS at 22:19

## 2020-07-25 NOTE — PROGRESS NOTES
Hospitalist Progress Note     Admit Date:  2020  3:20 PM   Name:  Adriana Albarran   Age:  81 y.o.  :  1935   MRN:  [id number]   PCP:  Gabriella Tamez MD  Treatment Team: Attending Provider: Orlinda Sandhoff, MD; Consulting Provider: Marino Agudelo MD; Consulting Provider: Gamal Delong MD; Utilization Review: Harini Carmona; Care Manager: Damian Ny    Subjective:   Patient is an 80yo M with a history of COPD, PAD, tophaceous gout, and alcohol abuse who presented to ER for sacral sore worsening for two weeks. 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 Surgery consulted who performed debridement on . Confirmed presence of fistula and osteomyelitis. Wound care consulted. PICC in place for TPN, nutrition following.       Feeling well. No agitation or confusion. Sacral wound pain. Has planned diverting colostomy next week. Continues TPN, taking some PO. Nutrition following. No fevers, still tachy at times. They are aware objective:     Patient Vitals for the past 24 hrs:   Temp Pulse Resp BP SpO2   20 1544 97.8 °F (36.6 °C) (!) 109 20 117/64 97 %   20 1208 98.3 °F (36.8 °C) (!) 105 20 96/53 99 %   20 0814 97.5 °F (36.4 °C) (!) 111 20 93/51 99 %   20 0354 97.5 °F (36.4 °C) (!) 102 18 114/65 93 %   20 0025 97.5 °F (36.4 °C) 86 16 111/65 93 %   20 1930 98.1 °F (36.7 °C) 88 14 103/51 99 %   20 1655 97.6 °F (36.4 °C) 87 20 116/69 100 %     Oxygen Therapy  O2 Sat (%): 97 % (20 1544)  Pulse via Oximetry: 93 beats per minute (20 1100)  O2 Device: Nasal cannula (20 1100)  O2 Flow Rate (L/min): 2 l/min (20 1100)    Intake/Output Summary (Last 24 hours) at 2020 1617  Last data filed at 2020 1000  Gross per 24 hour   Intake 3820 ml   Output 3700 ml   Net 120 ml         REVIEW OF SYSTEMS: Comprehensive ROS performed and negative except as stated in HPI.     Physical Examination:  General:    Some discomfort from sacral wound  Head:  NCAT  CV:   RRR  Lungs:   CTAB, no distress on RA  Abdomen:   +BS, soft, NTND  Extremities: tophi , no obvious acute gout  Skin:     No petechia or purpura. Dressing on sacral ulcer, not examined today  Neuro:  alert      Data Review:  I have reviewed all labs, meds, telemetry events, and studies from the last 24 hours. Recent Results (from the past 24 hour(s))   VANCOMYCIN, TROUGH    Collection Time: 07/24/20  8:38 PM   Result Value Ref Range    Vancomycin,trough 11.9 5 - 20 ug/mL   METABOLIC PANEL, COMPREHENSIVE    Collection Time: 07/25/20  6:58 AM   Result Value Ref Range    Sodium 140 136 - 145 mmol/L    Potassium 4.5 3.5 - 5.1 mmol/L    Chloride 114 (H) 98 - 107 mmol/L    CO2 19 (L) 21 - 32 mmol/L    Anion gap 7 7 - 16 mmol/L    Glucose 119 (H) 65 - 100 mg/dL    BUN 20 8 - 23 MG/DL    Creatinine 0.87 0.8 - 1.5 MG/DL    GFR est AA >60 >60 ml/min/1.73m2    GFR est non-AA >60 >60 ml/min/1.73m2    Calcium 8.9 8.3 - 10.4 MG/DL    Bilirubin, total 0.1 (L) 0.2 - 1.1 MG/DL    ALT (SGPT) 83 (H) 12 - 65 U/L    AST (SGOT) 128 (H) 15 - 37 U/L    Alk.  phosphatase 163 (H) 50 - 136 U/L    Protein, total 6.9 6.3 - 8.2 g/dL    Albumin 1.2 (L) 3.2 - 4.6 g/dL    Globulin 5.7 (H) 2.3 - 3.5 g/dL    A-G Ratio 0.2 (L) 1.2 - 3.5     PHOSPHORUS    Collection Time: 07/25/20  6:58 AM   Result Value Ref Range    Phosphorus 4.0 (H) 2.3 - 3.7 MG/DL   MAGNESIUM    Collection Time: 07/25/20  6:58 AM   Result Value Ref Range    Magnesium 2.1 1.8 - 2.4 mg/dL   PLEASE READ & DOCUMENT PPD TEST IN 48 HRS    Collection Time: 07/25/20  1:06 PM   Result Value Ref Range    PPD      mm Induration          All Micro Results     Procedure Component Value Units Date/Time    CULTURE, BLOOD [[id number]] Collected:  07/21/20 2015    Order Status:  Completed Specimen:  Blood Updated:  07/25/20 0718     Special Requests: --        LEFT  Antecubital       Culture result: NO GROWTH 4 DAYS CULTURE, URINE [[id number]] Collected:  07/21/20 1815    Order Status:  Canceled Specimen:  Cath Urine           Current Meds:  Current Facility-Administered Medications   Medication Dose Route Frequency    alteplase (CATHFLO) injection 1 mg  1 mg InterCATHeter ONCE    alteplase (CATHFLO) injection 1 mg  1 mg InterCATHeter ONCE    vancomycin (VANCOCIN) 1,000 mg in 0.9% sodium chloride (MBP/ADV) 250 mL  1,000 mg IntraVENous Q18H    TPN ADULT-CENTRAL - dextrose 15% amino acid 5%   IntraVENous QPM    TPN ADULT-CENTRAL - dextrose 15% amino acid 5%   IntraVENous QPM    ondansetron (ZOFRAN) injection 4 mg  4 mg IntraVENous I9L PRN    folic acid (FOLVITE) tablet 1 mg  1 mg Oral DAILY    thiamine HCL (B-1) tablet 100 mg  100 mg Oral DAILY    fat emulsion 20% (LIPOSYN, INTRAlipid) infusion 250 mL  250 mL IntraVENous QPM    glycopyrrolate-formoterol (BEVESPI AEROSPHERE) 9 mcg-4.8 mcg inhaler  2 Puff Inhalation BID RT    sodium hypochlorite (QUARTER STRENGTH DAKIN'S) 0.125% irrigation (bottle)   Topical BID    NUTRITIONAL SUPPORT ELECTROLYTE PRN ORDERS   Does Not Apply PRN    central line flush (saline) syringe 20 mL  20 mL InterCATHeter Q8H    sodium chloride (NS) flush 5-40 mL  5-40 mL IntraVENous Q8H    sodium chloride (NS) flush 5-40 mL  5-40 mL IntraVENous PRN    enoxaparin (LOVENOX) injection 40 mg  40 mg SubCUTAneous Q24H    [Held by provider] clopidogreL (PLAVIX) tablet 75 mg  75 mg Oral DAILY    albuterol (PROVENTIL VENTOLIN) nebulizer solution 2.5 mg  2.5 mg Nebulization Q4H PRN    [Held by provider] dilTIAZem ER (CARDIZEM CD) capsule 120 mg  120 mg Oral DAILY    probenecid-colchicine (ColBenemid) tablet 1 Tab (Patient Supplied)  1 Tab Oral DAILY    piperacillin-tazobactam (ZOSYN) 3.375 g in 0.9% sodium chloride (MBP/ADV) 100 mL  3.375 g IntraVENous Q8H    LORazepam (ATIVAN) tablet 1 mg  1 mg Oral Q6H PRN    morphine injection 2 mg  2 mg IntraVENous Q4H PRN    acetaminophen (TYLENOL) tablet 650 mg  650 mg Oral Q4H PRN       Diet:  DIET NUTRITIONAL SUPPLEMENTS  DIET NUTRITIONAL SUPPLEMENTS  DIET REGULAR    Other Studies (last 24 hours):  No results found. Assessment and Plan:     Hospital Problems as of 7/25/2020 Date Reviewed: 7/20/2020          Codes Class Noted - Resolved POA    Hypomagnesemia ICD-10-CM: E83.42  ICD-9-CM: 275.2  7/22/2020 - Present Unknown        * (Principal) Severe sepsis ([facility] ICD-10-CM: A41.9, R65.20  ICD-9-CM: 038.9, 995.92  7/21/2020 - Present Unknown        Pressure injury of deep tissue of sacral region ICD-10-CM: L89.156  ICD-9-CM: 707.03, 707.20  7/21/2020 - Present Unknown        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-CM: K60.4  ICD-9-CM: 565.1  7/21/2020 - Present Unknown        Hyponatremia ICD-10-CM: E87.1  ICD-9-CM: 276.1  7/21/2020 - Present Unknown        Hypokalemia ICD-10-CM: E87.6  ICD-9-CM: 276.8  7/21/2020 - Present Unknown        Acute kidney injury (MARY) with acute tubular necrosis (ATN) (HCC) ICD-10-CM: N17.0  ICD-9-CM: 584.5  7/21/2020 - Present Unknown        Chronic hypoxemic respiratory failure (HCC) ICD-10-CM: J96.11  ICD-9-CM: 518.83, 799.02  7/21/2020 - Present Unknown        Normocytic anemia ICD-10-CM: D64.9  ICD-9-CM: 285.9  7/21/2020 - Present Unknown        Severe protein-calorie malnutrition (HCC) (Chronic) ICD-10-CM: E43  ICD-9-CM: 262  7/21/2020 - Present Yes        Chronic obstructive pulmonary disease ([facility] ICD-10-CM: J44.9  ICD-9-CM: 496  6/22/2020 - Present Yes        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  ICD-9-CM: 274.03  5/18/2018 - Present Unknown        Atherosclerosis of native artery of extremity with intermittent claudication (HCC) (Chronic) ICD-10-CM: Z25.598  ICD-9-CM: 440.21  5/13/2014 - Present Yes        Former cigarette smoker ICD-10-CM: A00.990  ICD-9-CM: V15.82  5/13/2014 - Present Yes        HTN (hypertension) (Chronic) ICD-10-CM: I10  ICD-9-CM: 401.9  5/13/2014 - Present Yes              A/P:    1.   Severe sepsis secondary to sacral decubitus/rectocutaneous fistula. Continue vanc/zosyn. S/p debridement. Vitals/MARY improved  Planned diverting colostomy     2. Acute kidney injury - resolved      3. Hyponatremia hypokalemia. Resolvedrepleted     4. 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 Significant protein and total calorie malnutritionappreciate nutrition managementTPN and monitor intake     6.  alcohol abuse - thiamin/folate, change to PO. PRN ativan if withdrawal signs    7. Peripheral vascular disease continue home Plavixhave held Pletal for now. This is unchanged  8. History of oxygen dependent COPDcontinue PRN albuterol and oxygen as needed. Continuing with substitutes for home meds. 9.  History of hypertension--Home Lasix held. Diltiazem held slowly resume pending clinical course. Discharge planning: Skilled nursing facility/LTAC  DVT ppx: Lovenox     Code status: Full code  Decision Maker: Selfoldest child is Lebron Lundberg [phone]      Signed:  Gunner Fong. John DANIELSON

## 2020-07-25 NOTE — PROGRESS NOTES
Comprehensive Nutrition Assessment    Type and Reason for Visit: Reassess, Consult  This assessment was completed remotely. Patient consent was obtained for remote assessment. Nutrition Recommendations/Plan:   Change to regular diet. TPN: Continue current macronutrients. Lytes/L of TPN:   Sodium (30 meq Na acetate), Potassium 40 meq (20 meq KAcetate, 20 meq KPO4), 8 meq Mg,no calcium  Other additives: MTE, MVI. Vitamin and Mineral Supplement Therapy:  Nutrition support orders for electrolyte management replacement are activated and placed on the MAR.   Labs:   BMP, Phos, Mg,in AM and MWF.    Nutrition Assessment:   Nutrition History and Allergies: Pt reports hx of fall ~10 days ago with minimal mobility and oral intake since that time. He reports his family has been providing foods and beverages at best during this time he was able to consume [place] half a handful of food. \"  He c/o early satiety and abdominal fullness. He reports prior to this acute decline his overall intake had been declining for ~ 6 months. He has experienced wt loss of 4% over the past 2 months and a total of 8% over the past year based on current bed weight. This weight is likely falsely elevated secondary to edema. PMH remarkable for COPD, PVD, gout, CKD, and ETOH abuse. Admitted w/ severe sepsis . 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 PPN started 7/22 and changed to CPN on 7/23. Plan for diverting colostomy on 7/27. Also likely will need  addtional debridement. Central line:  Double lumen PICC 7/22. Remains TPN dependent for sacral/anal wound healing until able to consume >60% of needs from po diet. Talked to pt  via room phone. He says he is eating a little bit here and there but not a whole lot. He says he drank all of an Ensure and ate all of a magic cup yesterday as well as a serving of peaches.  He says food tastes real salty and that makes him stop eating. He guesses it is him and not the food. He denies any difficulty chewing and says he has enough teeth to chew on a chicken leg. Recorded intake by CNA of 25% for breakfast today  PO Folate and thiamine added 7/24. .      Estimated Daily Nutrient Needs:  Energy (kcal):  9381-2955(30-35kcal/kg IBW 70kg secondary edema, hx CKD, wound  Protein (g):  105-150(1.5-2 g/kg IBW d/t wnd)-GFR now >60       Fluid (ml/day):  1ml/kcal/day    Nutrition related findings:  Edema : 1+ BLE  Lab Results   Component Value Date/Time     07/25/2020 06:58 AM    K 4.5 07/25/2020 06:58 AM     (H) 07/25/2020 06:58 AM    CO2 19 (L) 07/25/2020 06:58 AM    AGAP 7 07/25/2020 06:58 AM     (H) 07/25/2020 06:58 AM    BUN 20 07/25/2020 06:58 AM    CREA 0.87 07/25/2020 06:58 AM    GFRAA >60 07/25/2020 06:58 AM    GFRNA >60 07/25/2020 06:58 AM    Phos 4.0, Mg 2.1  Corrected calcium: 11.1  Na and calcium omitted from TPN last night  Current TPN provides ~80 meq K/d, 60 meq Phos/d,  TPN anions ~0.71: 1(Cl:acetate). 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)       Current Nutrition Therapies:   DIET NUTRITIONAL SUPPLEMENTS All Meals; Ensure [employer] ( )  DIET NUTRITIONAL SUPPLEMENTS Lunch, Dinner; Magic Cups ( )  DIET MECHANICAL SOFT  Current Parenteral Nutrition Orders:  · Type and Formula: 15%DEX/ 5%AA   · Lipids: 250ml, Daily  · Duration: Continuous  · Rate/Volume: 85 ml/hr or 2L/d  · Current PN Order Provides: 1920 kcal/d (91% of EERs), 100 grams of protein/d (95% of EPR), 300 grams of CHO/d, ~2300 ml of total volume/d      Anthropometric Measures:  · Height:  5' 8\" (172.7 cm)  · Current Body Wt:  81.6 kg (179 lb 14.3 oz) -stated  · Admission Body Wt:  180 lb(stated wt)    · Usual Body Wt:  195 lb     · Ideal Body Wt:  154:  116.8 %   ·  Body mass index is 27.37 kg/m².   · BMI Categories: Overweight (BMI 25.0-29. 9)       Nutrition Diagnosis:   · Severe malnutrition related to catabolic illness, inadequate protein-energy intake as evidenced by (malnutrition criteria as identified above)      Nutrition Interventions:   Food and/or Nutrient Delivery: Modify parenteral nutrition, Continue oral nutrition supplement, Modify current diet  Coordination of Nutrition Care: Discussed with Ines Fox RN. She verified TPN rate. She has not noticed any difficulty chewing. Goals:  Meet > 75 % estimated needs within 7 days       Nutrition Monitoring and Evaluation:   Food/Nutrient Intake Outcomes: Food and nutrient intake, Supplement intake, Parenteral nutrition intake/tolerance  Physical Signs/Symptoms Outcomes: Skin(wound healing)    Discharge Planning:     Too soon to determine     Electronically signed by Darian Moon RD on 7/25/2020 at 11:00 AM    Contact: [phone]

## 2020-07-25 NOTE — PROGRESS NOTES
Sacral wound dressing change performed. C/D/I. Pt tolerated well. Hourly rounds completed throughout this shift. Pt resting in bed; denies needs at this time. Will continue to monitor and report to oncoming night shift nurse.

## 2020-07-25 NOTE — ANESTHESIA PREPROCEDURE EVALUATION
Relevant Problems   No relevant active problems       Anesthetic History               Review of Systems / Medical History  Patient summary reviewed and pertinent labs reviewed    Pulmonary    COPD      Smoker (Former)         Neuro/Psych              Cardiovascular    Hypertension  Valvular problems/murmurs (mild): mitral insufficiency        PAD and hyperlipidemia    Exercise tolerance: <4 METS: Due to debility  Comments: TTE: 6/2020  NL LVF, mild MR   GI/Hepatic/Renal                Endo/Other        Arthritis (Gout)     Other Findings   Comments: ETOH abuse? Admitted w/ severe sepsis . 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 PPN started 7/22 and changed to CPN on 7/23. Plan for diverting colostomy on 7/27. 7/26: According to nurse patient is more lethargic this AM, thinks it might ETOH withdrawal. Patient is slow to respond to questions, seems semi-alert. Spoke with grandson at bedside. Discussed the possibility of post-op ventilation. Diverting colostomy planned for 7/27 was cancelled as pt was transferred to ICU 7/26 PM d/t fever, hypotension and lethargy. COVID test pending. (rapid negative)    Transferred out of ICU 7/27 7/27 @ 1900 Seen on 8th floor, alert and oriented x 4, breathing and speaking comfortably on 2LNC. Appears frail and deconditioned.          Physical Exam    Airway  Mallampati: III  TM Distance: > 6 cm  Neck ROM: decreased range of motion       Comments: beard Cardiovascular    Rhythm: regular           Dental  No notable dental hx       Pulmonary    Rhonchi:bilateral             Abdominal         Other Findings            Anesthetic Plan    ASA: 4  Anesthesia type: general    Monitoring Plan: Arterial line    Post procedure ventilation   Induction: RSI and Intravenous  Anesthetic plan and risks discussed with: Patient        Type and cross for 1 unit blood  Isolation precautions

## 2020-07-25 NOTE — PROGRESS NOTES
Pharmacokinetic Consult to Pharmacist    Caryle Morel is a 81 y.o. male being treated for skin and structure infection  with vanc/zosyn/clindamycin. Height: 5' 8\" (172.7 cm)  Weight: 81.6 kg (180 lb)  Lab Results   Component Value Date/Time    BUN 20 07/25/2020 06:58 AM    Creatinine 0.87 07/25/2020 06:58 AM    WBC 12.5 (H) 07/24/2020 04:38 AM    Lactic acid 1.0 07/21/2020 08:15 PM      Estimated Creatinine Clearance: 61.1 mL/min (based on SCr of 0.87 mg/dL). Lab Results   Component Value Date/Time    Vancomycin,trough 11.9 07/24/2020 08:38 PM     Day 5 of vancomycin. Goal trough is 15-20mcg/ml. Trough sub therapeutic - will adjust dose to 1000 mg q 18 hours. Will continue to follow patient and order levels when clinically indicated.     Thank you,  Earlene Torres, PharmD, [address]  Clinical Pharmacist  [phone]

## 2020-07-25 NOTE — PROGRESS NOTES
Pt dressing changed as ordered. Medicated for pain as needed. Tolerated dressing change well    Hourly rounds performed through shift, pt denies needs at this time. Bed in low position and call light/ personal items within reach. Will continue to monitor and report to day shift nurse.

## 2020-07-26 ENCOUNTER — APPOINTMENT (OUTPATIENT)
Dept: GENERAL RADIOLOGY | Age: 85
DRG: 853 | End: 2020-07-26
Attending: FAMILY MEDICINE
Payer: MEDICARE

## 2020-07-26 PROBLEM — J96.21 ACUTE ON CHRONIC RESPIRATORY FAILURE WITH HYPOXIA AND HYPERCAPNIA (HCC): Status: ACTIVE | Noted: 2020-07-21

## 2020-07-26 PROBLEM — J96.22 ACUTE ON CHRONIC RESPIRATORY FAILURE WITH HYPOXIA AND HYPERCAPNIA (HCC): Status: ACTIVE | Noted: 2020-07-21

## 2020-07-26 PROBLEM — E87.5 HYPERKALEMIA: Status: ACTIVE | Noted: 2020-07-26

## 2020-07-26 PROBLEM — E77.8 HYPOPROTEINEMIA (HCC): Status: ACTIVE | Noted: 2020-07-26

## 2020-07-26 PROBLEM — E87.29 HYPERCHLOREMIC ACIDOSIS: Status: ACTIVE | Noted: 2020-07-26

## 2020-07-26 LAB
ALBUMIN SERPL-MCNC: 1 G/DL (ref 3.2–4.6)
ALBUMIN SERPL-MCNC: 1.2 G/DL (ref 3.2–4.6)
ALBUMIN SERPL-MCNC: 1.3 G/DL (ref 3.2–4.6)
ALBUMIN/GLOB SERPL: 0.2 {RATIO} (ref 1.2–3.5)
ALBUMIN/GLOB SERPL: 0.2 {RATIO} (ref 1.2–3.5)
ALBUMIN/GLOB SERPL: 0.3 {RATIO} (ref 1.2–3.5)
ALP SERPL-CCNC: 144 U/L (ref 50–136)
ALP SERPL-CCNC: 181 U/L (ref 50–136)
ALP SERPL-CCNC: 207 U/L (ref 50–136)
ALT SERPL-CCNC: 54 U/L (ref 12–65)
ALT SERPL-CCNC: 76 U/L (ref 12–65)
ALT SERPL-CCNC: 80 U/L (ref 12–65)
AMMONIA PLAS-SCNC: 20 UMOL/L (ref 11–32)
ANION GAP SERPL CALC-SCNC: 10 MMOL/L (ref 7–16)
ANION GAP SERPL CALC-SCNC: 3 MMOL/L (ref 7–16)
ANION GAP SERPL CALC-SCNC: 5 MMOL/L (ref 7–16)
ARTERIAL PATENCY WRIST A: YES
ARTERIAL PATENCY WRIST A: YES
AST SERPL-CCNC: 100 U/L (ref 15–37)
AST SERPL-CCNC: 65 U/L (ref 15–37)
AST SERPL-CCNC: 82 U/L (ref 15–37)
BACTERIA SPEC CULT: NORMAL
BASE DEFICIT BLD-SCNC: 4 MMOL/L
BASE DEFICIT BLD-SCNC: 5 MMOL/L
BASOPHILS # BLD: 0 K/UL (ref 0–0.2)
BASOPHILS NFR BLD: 0 % (ref 0–2)
BDY SITE: ABNORMAL
BDY SITE: ABNORMAL
BILIRUB SERPL-MCNC: 0.1 MG/DL (ref 0.2–1.1)
BILIRUB SERPL-MCNC: 0.2 MG/DL (ref 0.2–1.1)
BILIRUB SERPL-MCNC: 0.2 MG/DL (ref 0.2–1.1)
BUN SERPL-MCNC: 21 MG/DL (ref 8–23)
BUN SERPL-MCNC: 25 MG/DL (ref 8–23)
BUN SERPL-MCNC: 26 MG/DL (ref 8–23)
CALCIUM SERPL-MCNC: 7.2 MG/DL (ref 8.3–10.4)
CALCIUM SERPL-MCNC: 8.6 MG/DL (ref 8.3–10.4)
CALCIUM SERPL-MCNC: 8.7 MG/DL (ref 8.3–10.4)
CHLORIDE SERPL-SCNC: 116 MMOL/L (ref 98–107)
CHLORIDE SERPL-SCNC: 117 MMOL/L (ref 98–107)
CHLORIDE SERPL-SCNC: 92 MMOL/L (ref 98–107)
CO2 BLD-SCNC: 23 MMOL/L
CO2 BLD-SCNC: 24 MMOL/L
CO2 SERPL-SCNC: 18 MMOL/L (ref 21–32)
CO2 SERPL-SCNC: 23 MMOL/L (ref 21–32)
CO2 SERPL-SCNC: 23 MMOL/L (ref 21–32)
COLLECT TIME,HTIME: 1427
COLLECT TIME,HTIME: 1940
CREAT SERPL-MCNC: 0.86 MG/DL (ref 0.8–1.5)
CREAT SERPL-MCNC: 0.89 MG/DL (ref 0.8–1.5)
CREAT SERPL-MCNC: 1.44 MG/DL (ref 0.8–1.5)
DIFFERENTIAL METHOD BLD: ABNORMAL
EOSINOPHIL # BLD: 0.3 K/UL (ref 0–0.8)
EOSINOPHIL NFR BLD: 2 % (ref 0.5–7.8)
ERYTHROCYTE [DISTWIDTH] IN BLOOD BY AUTOMATED COUNT: 14.8 % (ref 11.9–14.6)
EXHALED MINUTE VOLUME, VE: 15.2 L/MIN
FLOW RATE ISTAT,IFRATE: 3 L/MIN
GAS FLOW.O2 O2 DELIVERY SYS: ABNORMAL L/MIN
GAS FLOW.O2 O2 DELIVERY SYS: ABNORMAL L/MIN
GAS FLOW.O2 SETTING OXYMISER: 8 BPM
GLOBULIN SER CALC-MCNC: 4.9 G/DL (ref 2.3–3.5)
GLOBULIN SER CALC-MCNC: 4.9 G/DL (ref 2.3–3.5)
GLOBULIN SER CALC-MCNC: 5.2 G/DL (ref 2.3–3.5)
GLUCOSE BLD STRIP.AUTO-MCNC: 119 MG/DL (ref 65–100)
GLUCOSE SERPL-MCNC: 111 MG/DL (ref 65–100)
GLUCOSE SERPL-MCNC: 119 MG/DL (ref 65–100)
GLUCOSE SERPL-MCNC: 2071 MG/DL (ref 65–100)
HCO3 BLD-SCNC: 21.6 MMOL/L (ref 22–26)
HCO3 BLD-SCNC: 22.4 MMOL/L (ref 22–26)
HCT VFR BLD AUTO: 32.7 % (ref 41.1–50.3)
HGB BLD-MCNC: 10.3 G/DL (ref 13.6–17.2)
IMM GRANULOCYTES # BLD AUTO: 0.9 K/UL (ref 0–0.5)
IMM GRANULOCYTES NFR BLD AUTO: 7 % (ref 0–5)
INSPIRATION.DURATION SETTING TIME VENT: 0.9 SEC
LACTATE SERPL-SCNC: 0.5 MMOL/L (ref 0.4–2)
LACTATE SERPL-SCNC: 1 MMOL/L (ref 0.4–2)
LYMPHOCYTES # BLD: 2.4 K/UL (ref 0.5–4.6)
LYMPHOCYTES NFR BLD: 18 % (ref 13–44)
MAGNESIUM SERPL-MCNC: 2.4 MG/DL (ref 1.8–2.4)
MCH RBC QN AUTO: 31.8 PG (ref 26.1–32.9)
MCHC RBC AUTO-ENTMCNC: 31.5 G/DL (ref 31.4–35)
MCV RBC AUTO: 100.9 FL (ref 79.6–97.8)
MONOCYTES # BLD: 1.4 K/UL (ref 0.1–1.3)
MONOCYTES NFR BLD: 10 % (ref 4–12)
NEUTS SEG # BLD: 8.5 K/UL (ref 1.7–8.2)
NEUTS SEG NFR BLD: 63 % (ref 43–78)
NRBC # BLD: 0 K/UL (ref 0–0.2)
O2/TOTAL GAS SETTING VFR VENT: 32 %
O2/TOTAL GAS SETTING VFR VENT: 40 %
PCO2 BLD: 40.3 MMHG (ref 35–45)
PCO2 BLD: 50.8 MMHG (ref 35–45)
PEEP RESPIRATORY: 10 CMH2O
PH BLD: 7.25 [PH] (ref 7.35–7.45)
PH BLD: 7.34 [PH] (ref 7.35–7.45)
PHOSPHATE SERPL-MCNC: 3.6 MG/DL (ref 2.3–3.7)
PIP ISTAT,IPIP: 19
PLATELET # BLD AUTO: 324 K/UL (ref 150–450)
PMV BLD AUTO: 8.8 FL (ref 9.4–12.3)
PO2 BLD: 152 MMHG (ref 75–100)
PO2 BLD: 66 MMHG (ref 75–100)
POTASSIUM SERPL-SCNC: 5 MMOL/L (ref 3.5–5.1)
POTASSIUM SERPL-SCNC: 5.5 MMOL/L (ref 3.5–5.1)
POTASSIUM SERPL-SCNC: >10 MMOL/L (ref 3.5–5.1)
PROT SERPL-MCNC: 5.9 G/DL (ref 6.3–8.2)
PROT SERPL-MCNC: 6.2 G/DL (ref 6.3–8.2)
PROT SERPL-MCNC: 6.4 G/DL (ref 6.3–8.2)
RBC # BLD AUTO: 3.24 M/UL (ref 4.23–5.6)
SAO2 % BLD: 89 % (ref 95–98)
SAO2 % BLD: 99 % (ref 95–98)
SERVICE CMNT-IMP: ABNORMAL
SERVICE CMNT-IMP: NORMAL
SODIUM SERPL-SCNC: 120 MMOL/L (ref 136–145)
SODIUM SERPL-SCNC: 143 MMOL/L (ref 136–145)
SODIUM SERPL-SCNC: 144 MMOL/L (ref 136–145)
SPECIMEN TYPE: ABNORMAL
SPECIMEN TYPE: ABNORMAL
VT SETTING VENT: 745 ML
WBC # BLD AUTO: 13.4 K/UL (ref 4.3–11.1)

## 2020-07-26 PROCEDURE — 71045 X-RAY EXAM CHEST 1 VIEW: CPT

## 2020-07-26 PROCEDURE — 87635 SARS-COV-2 COVID-19 AMP PRB: CPT

## 2020-07-26 PROCEDURE — 94660 CPAP INITIATION&MGMT: CPT

## 2020-07-26 PROCEDURE — 74011000258 HC RX REV CODE- 258

## 2020-07-26 PROCEDURE — 74011000258 HC RX REV CODE- 258: Performed by: FAMILY MEDICINE

## 2020-07-26 PROCEDURE — 74011000258 HC RX REV CODE- 258: Performed by: INTERNAL MEDICINE

## 2020-07-26 PROCEDURE — 65610000006 HC RM INTENSIVE CARE

## 2020-07-26 PROCEDURE — 74018 RADEX ABDOMEN 1 VIEW: CPT

## 2020-07-26 PROCEDURE — 82803 BLOOD GASES ANY COMBINATION: CPT

## 2020-07-26 PROCEDURE — P9047 ALBUMIN (HUMAN), 25%, 50ML: HCPCS | Performed by: INTERNAL MEDICINE

## 2020-07-26 PROCEDURE — 82140 ASSAY OF AMMONIA: CPT

## 2020-07-26 PROCEDURE — 74011000250 HC RX REV CODE- 250: Performed by: FAMILY MEDICINE

## 2020-07-26 PROCEDURE — 85025 COMPLETE CBC W/AUTO DIFF WBC: CPT

## 2020-07-26 PROCEDURE — 99223 1ST HOSP IP/OBS HIGH 75: CPT | Performed by: INTERNAL MEDICINE

## 2020-07-26 PROCEDURE — 74011000250 HC RX REV CODE- 250: Performed by: INTERNAL MEDICINE

## 2020-07-26 PROCEDURE — 94640 AIRWAY INHALATION TREATMENT: CPT

## 2020-07-26 PROCEDURE — 82962 GLUCOSE BLOOD TEST: CPT

## 2020-07-26 PROCEDURE — 5A09357 ASSISTANCE WITH RESPIRATORY VENTILATION, LESS THAN 24 CONSECUTIVE HOURS, CONTINUOUS POSITIVE AIRWAY PRESSURE: ICD-10-PCS | Performed by: FAMILY MEDICINE

## 2020-07-26 PROCEDURE — 80053 COMPREHEN METABOLIC PANEL: CPT

## 2020-07-26 PROCEDURE — 83605 ASSAY OF LACTIC ACID: CPT

## 2020-07-26 PROCEDURE — 74011250636 HC RX REV CODE- 250/636: Performed by: INTERNAL MEDICINE

## 2020-07-26 PROCEDURE — 74011250637 HC RX REV CODE- 250/637: Performed by: FAMILY MEDICINE

## 2020-07-26 PROCEDURE — 83735 ASSAY OF MAGNESIUM: CPT

## 2020-07-26 PROCEDURE — 36600 WITHDRAWAL OF ARTERIAL BLOOD: CPT

## 2020-07-26 PROCEDURE — 84100 ASSAY OF PHOSPHORUS: CPT

## 2020-07-26 PROCEDURE — 74011250636 HC RX REV CODE- 250/636: Performed by: FAMILY MEDICINE

## 2020-07-26 PROCEDURE — 87040 BLOOD CULTURE FOR BACTERIA: CPT

## 2020-07-26 PROCEDURE — 36415 COLL VENOUS BLD VENIPUNCTURE: CPT

## 2020-07-26 PROCEDURE — 74011000250 HC RX REV CODE- 250

## 2020-07-26 RX ORDER — ALBUMIN HUMAN 250 G/1000ML
25 SOLUTION INTRAVENOUS
Status: COMPLETED | OUTPATIENT
Start: 2020-07-26 | End: 2020-07-27

## 2020-07-26 RX ORDER — NOREPINEPHRINE BITARTRATE/D5W 4MG/250ML
.5-3 PLASTIC BAG, INJECTION (ML) INTRAVENOUS
Status: DISCONTINUED | OUTPATIENT
Start: 2020-07-26 | End: 2020-07-27

## 2020-07-26 RX ORDER — LORAZEPAM 2 MG/ML
1 INJECTION INTRAMUSCULAR
Status: DISCONTINUED | OUTPATIENT
Start: 2020-07-26 | End: 2020-07-29 | Stop reason: HOSPADM

## 2020-07-26 RX ORDER — SODIUM CHLORIDE, SODIUM LACTATE, POTASSIUM CHLORIDE, CALCIUM CHLORIDE 600; 310; 30; 20 MG/100ML; MG/100ML; MG/100ML; MG/100ML
100 INJECTION, SOLUTION INTRAVENOUS CONTINUOUS
Status: DISCONTINUED | OUTPATIENT
Start: 2020-07-26 | End: 2020-07-26

## 2020-07-26 RX ORDER — ALBUTEROL SULFATE 0.83 MG/ML
2.5 SOLUTION RESPIRATORY (INHALATION)
Status: DISCONTINUED | OUTPATIENT
Start: 2020-07-26 | End: 2020-07-27

## 2020-07-26 RX ORDER — NOREPINEPHRINE BITARTRATE/D5W 4MG/250ML
PLASTIC BAG, INJECTION (ML) INTRAVENOUS
Status: COMPLETED
Start: 2020-07-26 | End: 2020-07-26

## 2020-07-26 RX ADMIN — Medication 10 ML: at 14:00

## 2020-07-26 RX ADMIN — DAKIN'S SOLUTION 0.125% (QUARTER STRENGTH): 0.12 SOLUTION at 09:05

## 2020-07-26 RX ADMIN — Medication 20 ML: at 14:00

## 2020-07-26 RX ADMIN — VANCOMYCIN HYDROCHLORIDE 1000 MG: 1 INJECTION, POWDER, LYOPHILIZED, FOR SOLUTION INTRAVENOUS at 09:04

## 2020-07-26 RX ADMIN — ALBUMIN (HUMAN) 25 G: 0.25 INJECTION, SOLUTION INTRAVENOUS at 18:45

## 2020-07-26 RX ADMIN — SODIUM PHOSPHATE, MONOBASIC, MONOHYDRATE: 276; 142 INJECTION, SOLUTION INTRAVENOUS at 18:08

## 2020-07-26 RX ADMIN — Medication 10 MCG/MIN: at 18:30

## 2020-07-26 RX ADMIN — ENOXAPARIN SODIUM 40 MG: 40 INJECTION SUBCUTANEOUS at 20:00

## 2020-07-26 RX ADMIN — ALBUTEROL SULFATE 2.5 MG: 2.5 SOLUTION RESPIRATORY (INHALATION) at 19:34

## 2020-07-26 RX ADMIN — DEXTROSE MONOHYDRATE 10 MCG/MIN: 50 INJECTION, SOLUTION INTRAVENOUS at 18:30

## 2020-07-26 RX ADMIN — Medication 20 ML: at 22:00

## 2020-07-26 RX ADMIN — LORAZEPAM 1 MG: 2 INJECTION INTRAMUSCULAR; INTRAVENOUS at 12:58

## 2020-07-26 RX ADMIN — PIPERACILLIN AND TAZOBACTAM 3.38 G: 3; .375 INJECTION, POWDER, FOR SOLUTION INTRAVENOUS at 09:03

## 2020-07-26 RX ADMIN — Medication 10 ML: at 06:12

## 2020-07-26 RX ADMIN — Medication 20 ML: at 06:12

## 2020-07-26 RX ADMIN — PIPERACILLIN AND TAZOBACTAM 3.38 G: 3; .375 INJECTION, POWDER, FOR SOLUTION INTRAVENOUS at 17:06

## 2020-07-26 RX ADMIN — Medication 10 ML: at 22:00

## 2020-07-26 RX ADMIN — ALBUMIN (HUMAN) 25 G: 0.25 INJECTION, SOLUTION INTRAVENOUS at 23:00

## 2020-07-26 RX ADMIN — I.V. FAT EMULSION 250 ML: 20 EMULSION INTRAVENOUS at 18:16

## 2020-07-26 RX ADMIN — ALBUMIN (HUMAN) 25 G: 0.25 INJECTION, SOLUTION INTRAVENOUS at 21:05

## 2020-07-26 RX ADMIN — MORPHINE SULFATE 2 MG: 2 INJECTION, SOLUTION INTRAMUSCULAR; INTRAVENOUS at 02:26

## 2020-07-26 NOTE — PROGRESS NOTES
Pt is being transferred to the 5th floor ICU. Hourly rounds completed. Report given to [facility].

## 2020-07-26 NOTE — PROGRESS NOTES
This nurse was called to reassess this pt for primary nurse. Primary nurse states that this pt was acting different and c/o his left hand being numb. Stroke scale performed at bedside. No deficits noted. Pt is responsive and alert and aware of what's going on. Family member at bedside. Primary nurse will notify Dr Chelo Mc of pt status. Will continue to manage care.

## 2020-07-26 NOTE — PROGRESS NOTES
Hourly rounds completed. All needs met. Changed dressing per MD order. Will give report to the oncoming day shift nurse.

## 2020-07-26 NOTE — PROGRESS NOTES
Pt has been irritated and  Lethargic off and on this AM but responding to voice, decrease in following commands. Pt reported his mouth is dry, swabbed pt's mouth with sponge. Checked VS B/P 122/70, , Temp 100.9 (previous temp 102.1), rr 33. Pt reported decreased feeling to his right hand, neuro checks performed and WNL. . Dr. Foote Estimable notified of pt's VS., Ativan given per MD orders. 1330  Pt sleeping and snoring hard. Not easily aroused. Dr. Foote Estimable in pt's rm. Labs, blood cultures, x-ray abd ordered.

## 2020-07-26 NOTE — PROGRESS NOTES
Bedside, Verbal and Written shift change report given to Liz Maki Efrain (oncoming nurse) by Blair Dickens (offgoing nurse). Report included the following information SBAR, Kardex, ED Summary, MAR, Recent Results, Cardiac Rhythm NSR/ST and Alarm Parameters .

## 2020-07-26 NOTE — CONSULTS
CONSULT NOTE    Yi Dunn    7/26/2020    Date of Admission:  7/21/2020    The patient's chart is reviewed and the patient is discussed with the staff. Subjective: The patient is an 81 y.o.  male seen and evaluated at the request of Dr. London Alvarado for assistance with management of respiratory failure. He has a hx of COPD, PVD, gout, CKD, HTN, alcohol use, and malnutrition presenting with sepsis from a deep sacral decubitus with rectocutaneous fistula. He underwent surgery by Dr. Nain Park on 7/23 with wound debridement and evidence of osteomyelitis of coccygeal bone. He was started on TPN via a PICC line. He initially did well with plans for a diverting colostomy this coming week. Today he became confused and agitated and then obtunded. He was seen by the hospitalist and transfer to the ICU arranged for BIPAP. Because of bilateral pulmonary infiltrates, COVID testing was felt necessary. He has been on Vanc and zosyn since admit with no fevers reported. Review of Systems    Unable to provide any history         Patient Active Problem List   Diagnosis Code    Atherosclerosis of native artery of extremity with intermittent claudication ([facility] I70.219    Former cigarette smoker Z87.891    HTN (hypertension) I10    Hyperlipidemia E78.5    Gout M10.9    Peripheral vascular disease ([facility] [address] I73.9    Tophaceous gout M1A. 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 Taking?   acetaminophen (TYLENOL) 500 mg tablet   Yes Yes   Sig: Take 1,000 mg by mouth every six (6) hours as needed for Pain. albuterol (ProAir HFA) 90 mcg/actuation inhaler   No No   Sig: Take 2 Puffs by inhalation every four (4) hours as needed for Wheezing or Shortness of Breath. albuterol (ProAir HFA) 90 mcg/actuation inhaler   No Yes   Sig: Take 2 Puffs by inhalation every six (6) hours as needed for Wheezing or Shortness of Breath. cilostazoL (PLETAL) 100 mg tablet   Yes Yes   Sig: Take  by mouth Before breakfast and dinner. clopidogreL (Plavix) 75 mg tab   Yes Yes   Sig: Take 75 mg by mouth. dilTIAZem CD (CARDIZEM CD) 120 mg ER capsule   No Yes   Sig: Take 1 Cap by mouth daily. doxycycline (MONODOX) 100 mg capsule   No No   Sig: Take 1 Cap by mouth two (2) times a day for 7 days. furosemide (Lasix) 40 mg tablet   No Yes   Sig: Take 1 Tab by mouth daily. probenecid-colchicine (ColBenemid) 500-0.5 mg per tablet   No Yes   Sig: TAKE ONE TABLET BY MOUTH DAILY   umeclidinium-vilanteroL (Anoro Ellipta) 62.5-25 mcg/actuation inhaler   No Yes   Sig: Take 1 Puff by inhalation daily.       Facility-Administered Medications: None       Past Medical History:   Diagnosis Date    Atherosclerosis of native arteries of the extremities with intermittent claudication 5/13/2014    Chronic kidney disease     elevated labs see's dr Kassy Anderson ulcer ([facility] [address] 9/29/2017    Former cigarette smoker 5/13/2014    Gout     HTN (hypertension) 5/13/2014    Hypercholesterolemia     Hyperlipidemia 5/13/2014    Peripheral vascular disease ([facility] [address]      Past Surgical History:   Procedure Laterality Date    HX COLONOSCOPY      HX HEMORRHOIDECTOMY      HX MALIGNANT SKIN LESION EXCISION       Social History     Socioeconomic History    Marital status:      Spouse name: Not on file    Number of children: Not on file    Years of education: Not on file    Highest education level: Not on file   Occupational History    Not on file   Social Needs    Financial resource strain: Not on file    Food insecurity     Worry: Not on file     Inability: Not on file    Transportation needs     Medical: Not on file     Non-medical: Not on file   Tobacco Use    Smoking status: Former Smoker     Packs/day: 2.00     Years: 25.00     Pack years: 50.00     Types: Cigarettes    Smokeless tobacco: Former User   Substance and Sexual Activity    Alcohol use:  Yes     Alcohol/week: 6.7 standard drinks     Types: 8 Shots of liquor per week    Drug use: Not on file    Sexual activity: Not on file   Lifestyle    Physical activity     Days per week: Not on file     Minutes per session: Not on file    Stress: Not on file   Relationships    Social connections     Talks on phone: Not on file     Gets together: Not on file     Attends Jain service: Not on file     Active member of club or organization: Not on file     Attends meetings of clubs or organizations: Not on file     Relationship status: Not on file    Intimate partner violence     Fear of current or ex partner: Not on file     Emotionally abused: Not on file     Physically abused: Not on file     Forced sexual activity: Not on file   Other Topics Concern    Not on file   Social History Narrative    Not on file     Family History   Problem Relation Age of Onset    Hypertension Mother     Breast Cancer Mother      Allergies   Allergen Reactions    Aspirin Swelling    Prednisone Myalgia       Current Facility-Administered Medications   Medication Dose Route Frequency    LORazepam (ATIVAN) injection 1 mg  1 mg IntraVENous Q6H PRN    TPN ADULT-CENTRAL - dextrose 15% amino acid 5%   IntraVENous QPM    vancomycin (VANCOCIN) 1,000 mg in 0.9% sodium chloride (MBP/ADV) 250 mL  1,000 mg IntraVENous Q18H    ondansetron (ZOFRAN) injection 4 mg  4 mg IntraVENous S9H PRN    folic acid (FOLVITE) tablet 1 mg  1 mg Oral DAILY    thiamine HCL (B-1) tablet 100 mg  100 mg Oral DAILY    fat emulsion 20% (LIPOSYN, INTRAlipid) infusion 250 mL  250 mL IntraVENous QPM    glycopyrrolate-formoterol (BEVESPI AEROSPHERE) 9 mcg-4.8 mcg inhaler  2 Puff Inhalation BID RT    sodium hypochlorite (QUARTER STRENGTH DAKIN'S) 0.125% irrigation (bottle)   Topical BID    NUTRITIONAL SUPPORT ELECTROLYTE PRN ORDERS   Does Not Apply PRN    central line flush (saline) syringe 20 mL  20 mL InterCATHeter Q8H    sodium chloride (NS) flush 5-40 mL  5-40 mL IntraVENous Q8H    sodium chloride (NS) flush 5-40 mL  5-40 mL IntraVENous PRN    enoxaparin (LOVENOX) injection 40 mg  40 mg SubCUTAneous Q24H    [Held by provider] clopidogreL (PLAVIX) tablet 75 mg  75 mg Oral DAILY    albuterol (PROVENTIL VENTOLIN) nebulizer solution 2.5 mg  2.5 mg Nebulization Q4H PRN    dilTIAZem ER (CARDIZEM CD) capsule 120 mg  120 mg Oral DAILY    probenecid-colchicine (ColBenemid) tablet 1 Tab (Patient Supplied)  1 Tab Oral DAILY    piperacillin-tazobactam (ZOSYN) 3.375 g in 0.9% sodium chloride (MBP/ADV) 100 mL  3.375 g IntraVENous Q8H    LORazepam (ATIVAN) tablet 1 mg  1 mg Oral Q6H PRN    morphine injection 2 mg  2 mg IntraVENous Q4H PRN    acetaminophen (TYLENOL) tablet 650 mg  650 mg Oral Q4H PRN         Objective:     Vitals:    07/26/20 1538 07/26/20 1539 07/26/20 1801 07/26/20 1811   BP: 130/63  93/61    Pulse: (!) 126  (!) 118    Resp: (!) 32  (!) 32    Temp: 100 °F (37.8 °C) 98.1 °F (36.7 °C) 100 °F (37.8 °C)    SpO2: 95%  100% 100%   Weight:       Height:           PHYSICAL EXAM     Constitutional:  the patient is well developed and in no acute distress on BIPAP  EENMT:  Sclera clear, pupils equal, oral mucosa moist  Respiratory: Decreased BS bilaterally  Cardiovascular:  RRR without M,G,R  Gastrointestinal: soft and non-tender; with positive bowel sounds. Musculoskeletal: warm without cyanosis. There is trace lower extremity edema. Skin:  no jaundice or rashes, unable to visualize sacral wound due to critical condition  Neurologic: no gross neuro deficits     Psychiatric:  Poorly responsive    CXR:            Recent Labs     07/26/20  1430 07/24/20  0438   WBC 13.4* 12.5*   HGB 10.3* 9.6*   HCT 32.7* 29.0*    352     Recent Labs     07/26/20  1430 07/26/20  1345 07/26/20  0450 07/25/20  0658 07/24/20  0438    120* 144 140 145   K 5.5* PENDING 5.0 4.5 4.6   * 92* 116* 114* 117*   * 2,071* 111* 119* 157*   CO2 23 18* 23 19* 22   BUN 26* 21 25* 20 20   CREA 0.86 1.44 0.89 0.87 1.02   MG 2.4  --   --  2.1 2.3   PHOS 3.6  --   --  4.0* 5.5*   CA 8.6 7.2* 8.7 8.9 8.3   ALB 1.3* 1.0* 1.2* 1.2* 1.2*   TBILI 0.2 0.1* 0.2 0.1* 0.2   ALT 76* 54 80* 83* 41     Recent Labs     07/26/20  1429   PHI 7.25*   PCO2I 50.8*   PO2I 66*   HCO3I 22.4     Recent Labs     07/26/20  1726 07/26/20  1345   LAC 1.0 0.5       Assessment:  (Medical Decision Making)     Hospital Problems  Date Reviewed: 7/20/2020          Codes Class Noted POA    Hyperchloremic acidosis ICD-10-CM: E87.2  ICD-9-CM: 276.2  7/26/2020 Unknown    Contributes to acidosis and ventilatory requirement    Hypoproteinemia (HCC) ICD-10-CM: E77.8  ICD-9-CM: 273.8  7/26/2020 Unknown    Very severe and contributing to hypotension and likely interstitial edema. Hypomagnesemia ICD-10-CM: G37.95  ICD-9-CM: 275.2  7/22/2020 Unknown        * (Principal) Severe sepsis ([facility] [address] ICD-10-CM: A41.9, R65.20  ICD-9-CM: 038.9, 995.92  7/21/2020 Unknown    Due to wound infection.  Cannot exclude a developing pneumonia    Pressure injury of deep tissue of sacral region ICD-10-CM: L89.156  ICD-9-CM: 707.03, 707.20  7/21/2020 Unknown        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-CM: K60. 4  ICD-9-CM: 565.1  7/21/2020 Unknown        Hyponatremia ICD-10-CM: E87.1  ICD-9-CM: 276.1  7/21/2020 Unknown    Resolved. Hypokalemia ICD-10-CM: E87.6  ICD-9-CM: 276.8  7/21/2020 Unknown        Acute kidney injury (MARY) with acute tubular necrosis (ATN) (HCC) ICD-10-CM: N17.0  ICD-9-CM: 584.5  7/21/2020 Unknown    Creatinine only 0.86    Acute on chronic respiratory failure with hypoxia and hypercapnia (HCC) ICD-10-CM: J96.21, J96.22  ICD-9-CM: 518.84, 786.09, 799.02  7/21/2020     On BIPAP    Normocytic anemia ICD-10-CM: D64.9  ICD-9-CM: 285.9  7/21/2020 Unknown        Severe protein-calorie malnutrition (HCC) (Chronic) ICD-10-CM: V13  ICD-9-CM: 262  7/21/2020 Yes    Albumin 1.3    Chronic obstructive pulmonary disease ([facility] [address] ICD-10-CM: J44.9  ICD-9-CM: 496  6/22/2020 Yes    Severe by spirometry this year. 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  ICD-9-CM: 274.03  5/18/2018 Unknown        Atherosclerosis of native artery of extremity with intermittent claudication (HCC) (Chronic) ICD-10-CM: H65.483  ICD-9-CM: 440.21  5/13/2014 Yes        Former cigarette smoker ICD-10-CM: Q36.190  ICD-9-CM: V15.82  5/13/2014 Yes        HTN (hypertension) (Chronic) ICD-10-CM: I10  ICD-9-CM: 401.9  5/13/2014 Yes              Plan:  (Medical Decision Making)     Albumin 25 gm q 2h x 3. Continue BIPAP. Recheck ABG in 1 hr. Intubate prn. Levophed as needed to maintain MAP > 65. Follow serial labs. Continue current ABx. Inhaled BD. Daily ABG and CXR. CMP tomorrow. Await COVID results. --    More than 50% of the time documented was spent in face-to-face contact with the patient and in the care of the patient on the floor/unit where the patient is located. Thank you very much for this referral.  We appreciate the opportunity to participate in this patient's care. Will follow along with above stated plan.     Blaire Remy MD

## 2020-07-26 NOTE — PROGRESS NOTES
TRANSFER - OUT REPORT:    Verbal report given to Kulwinder Richardson RN(name) on Miriam Elise  being transferred to 5th floor ICU(unit) for change in patient condition(confused, lethargic, fever)       Report consisted of patients Situation, Background, Assessment and   Recommendations(SBAR). Information from the following report(s) SBAR and Kardex was reviewed with the receiving nurse. Lines:   PICC Double Lumen 01/09/73 Right;Basilic (Active)   Central Line Being Utilized Yes 07/26/20 1146   Criteria for Appropriate Use Total parenteral nutrition 07/26/20 1146   Site Assessment Clean, dry, & intact 07/26/20 1146   Phlebitis Assessment 0 07/26/20 1146   Infiltration Assessment 0 07/26/20 1146   Arm Circumference (cm) 24 cm 07/22/20 1623   Date of Last Dressing Change 07/22/20 07/26/20 1146   Dressing Status Clean, dry, & intact 07/26/20 1146   Action Taken Other (comment) 07/24/20 1358   External Catheter Length (cm) 0 centimeters 07/22/20 1623   Dressing Type Transparent 07/26/20 1146   Hub Color/Line Status Flushed;Patent 07/26/20 1146   Positive Blood Return (Site #1) Yes 07/26/20 1146   Hub Color/Line Status Flushed;Patent 07/26/20 1146   Positive Blood Return (Site #2) Yes 07/26/20 1146   Alcohol Cap Used No 07/26/20 1146       Peripheral IV 07/22/20 Left Antecubital (Active)   Site Assessment Clean, dry, & intact 07/26/20 1146   Phlebitis Assessment 0 07/26/20 1146   Infiltration Assessment 0 07/26/20 1146   Dressing Status Clean, dry, & intact 07/26/20 1146   Dressing Type Tape;Transparent 07/26/20 1146   Hub Color/Line Status Flushed;Patent 07/26/20 1146   Alcohol Cap Used No 07/26/20 1146       Peripheral IV 07/22/20 Anterior; Left Forearm (Active)   Site Assessment Clean, dry, & intact 07/26/20 1146   Phlebitis Assessment 0 07/26/20 1146   Infiltration Assessment 0 07/26/20 1146   Dressing Status Clean, dry, & intact 07/26/20 1146   Dressing Type Tape;Transparent 07/26/20 1146   Hub Color/Line Status Flushed;Patent 07/26/20 1146   Alcohol Cap Used No 07/26/20 1146        Opportunity for questions and clarification was provided.       Patient transported with:   O2 @ 5 liters  Tech

## 2020-07-26 NOTE — PROGRESS NOTES
TRANSFER - IN REPORT:    Verbal report received from [facility] (name) on [facility]  being received from 6th floor (unit) for change in patient condition(requiring BIPAP)      Report consisted of patients Situation, Background, Assessment and   Recommendations(SBAR). Information from the following report(s) SBAR, Kardex, ED Summary, OR Summary, Procedure Summary, Intake/Output, MAR, Recent Results, Med Rec Status and Cardiac Rhythm ST was reviewed with the receiving nurse. Opportunity for questions and clarification was provided. Assessment completed upon patients arrival to unit and care assumed.

## 2020-07-26 NOTE — PROGRESS NOTES
Patient arrived on 5L NC and placed on BIPAP. VSS. ST on monitor. Opens eyes to voice. Lethargic and unable to answer orientation questions or follow commands. Withdraws to pain. Pupils round, equal, and reactive. Pulses palpable all extremities. Dual skin assessment performed with Junior Burgess RN. Sacral wound noted. Nodules from gout present on elbows, knees, chest, and toes. Abdomen soft and distended.

## 2020-07-26 NOTE — PROGRESS NOTES
Called to reassess patient. Now much more lethargic. Awakens very briefly to stimulation. Some guarding abdomen. +BS. Has developed fever. HR remains tachy (did not get dilt this morning). BP stable. Concerning exam for intraabdominal process. Had 1mg ativan earlier for some withdrawal sx (confusion, shaking) and 2mg morphine early in the morning (0200), but no other sedating medications today. Labs including repeat blood cultures, lactic, cmp, bmp, abg  KUB stat pending.

## 2020-07-26 NOTE — PROGRESS NOTES
Comprehensive Nutrition Assessment    Type and Reason for Visit: Reassess, TPN Management (Hospitalist)  This assessment was completed remotely. Nutrition Recommendations/Plan:   TPN: Continue current macronutrients. Lytes/L of TPN:   Omit Sodium acetate, add 20 meq NaPO4, Potassium 0 meq (Omit KAcetate, and KPO4), 8 meq Mg,no calcium  Other additives: MTE, MVI. Vitamin and Mineral Supplement Therapy:  Nutrition support orders for electrolyte management replacement are activated and placed on the MAR.   Labs:   BMP daily, Phos, Mg MWF. Nutrition Assessment:  Nutrition History and Allergies: Pt reports hx of fall ~10 days ago with minimal mobility and oral intake since that time. He reports his family has been providing foods and beverages at best during this time he was able to consume [place] half a handful of food. \"  He c/o early satiety and abdominal fullness. He reports prior to this acute decline his overall intake had been declining for ~ 6 months. He has experienced wt loss of 4% over the past 2 months and a total of 8% over the past year based on current bed weight. This weight is likely falsely elevated secondary to edema. PMH remarkable for COPD, PVD, gout, CKD, and ETOH abuse. Admitted w/ severe sepsis . 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 PPN started 7/22 and changed to CPN on 7/23. Plan for diverting colostomy on 7/27. Also likely will need  addtional debridement. Central line:  Double lumen PICC 7/22. Remains TPN dependent for sacral/anal wound healing until able to consume >60% of needs from po diet. Pt did not answer room phone today. RN reports pt is more confused and less cooperative today. She reports that pt was drinking water all day yesterday but has little to drink today and refused breakfast.   Recorded po intake for 7/25: 25% of breakfast 10% of dinner.   PO Folate and thiamine added 7/24, but pt refused to take this AM  Malnutrition Assessment:  Malnutrition Status:  Severe malnutrition    Context:  Acute illness     Findings of the clinical characteristics of malnutrition:   Energy Intake:  7 - 50% or less of est energy requirements for 5 or more days  Body Fat Loss:  7 - Moderate body fat loss, Triceps   Muscle Mass Loss:  7 - Moderate muscle mass loss, Calf, Clavicles (pectoralis & deltoids), Hand (interosseous), Scapula (trapezius), Temples (temporalis)    Estimated Daily Nutrient Needs:  Energy (kcal):  0122-0080(30-35kcal/kg IBW 70kg secondary edema, hx CKD, wound  Protein (g):  105-150(1.5-2 g/kg IBW d/t wnd)-GFR now >60       Fluid (ml/day):  1ml/kcal/day    Nutrition Related Findings:       Edema: 1+ generalized and BLE. Lab Results   Component Value Date/Time     07/26/2020 04:50 AM    K 5.0 07/26/2020 04:50 AM     (H) 07/26/2020 04:50 AM    CO2 23 07/26/2020 04:50 AM    AGAP 5 (L) 07/26/2020 04:50 AM     (H) 07/26/2020 04:50 AM    BUN 25 (H) 07/26/2020 04:50 AM    CREA 0.89 07/26/2020 04:50 AM    GFRAA >60 07/26/2020 04:50 AM    GFRNA >60 07/26/2020 04:50 AM    Corrected calcium 10.94-current TPN contains no Ca  Current TPN provides ~80 meq K/d. 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6 kg (179 lb 14.3 oz)   · Admission Body Wt:  180 lb(stated wt)    · Usual Body Wt:  195 lb     · Ideal Body Wt:  154:  116.8 %   · Body mass index is 27.37 kg/m². · BMI Categories:  Overweight (BMI 25.0-29. 9)       Nutrition Diagnosis:   · Severe malnutrition related to catabolic illness, inadequate protein-energy intake as evidenced by (malnutrition criteria as identified above)      Nutrition Interventions:   Food and/or Nutrient Delivery: Modify parenteral nutrition, Continue oral nutrition supplement  Coordination of Nutrition Care: Discussed with Julien Galan RN    Goals:  Continue to meet >75% of needs       Nutrition Monitoring and Evaluation:   Food/Nutrient Intake Outcomes: Food and nutrient intake, Supplement intake, Parenteral nutrition intake/tolerance  Physical Signs/Symptoms Outcomes: Skin(wound healing)    Discharge Planning:     Too soon to determine     Electronically signed by Esmer Elam RD on 7/26/2020 at 11:19 AM    Contact: Sade Olguin, [address], [address] [place] [address], [address] [place] [address]

## 2020-07-26 NOTE — PROGRESS NOTES
Patient hypotensive and more lethargic. Grimaces to pain but will not open eyes. Dr. Jesus Monge and Dr. Colleen Strange at bedside.  Orders received for levophed, albumin, and repeat ABG in 1hr.

## 2020-07-26 NOTE — PROGRESS NOTES
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 No new issues. Planning to proceed with diverting colostomy tomorrow. AF, NAD. OBJECTIVE:  Constitutional:Sleepy cooperative patient in no acute distress; appears stated age   Visit Vitals  /78 (BP 1 Location: Left arm, BP Patient Position: At rest)   Pulse (!) 123   Temp 98.5 °F (36.9 °C)   Resp 20   Ht 5' 8\" (1.727 m)   Wt 180 lb (81.6 kg)   SpO2 95%   BMI 27.37 kg/m²     Eyes: Sclera are clear. ENMT: no external lesions gross hearing normal; no obvious neck masses, no ear or lip lesions  CV: Tachy. Normal perfusion  Resp: No JVD. Breathing is  non-labored; no audible wheezing.     GI: soft and non-distended     : Condom Cath - jesús urine  Integument: dressing intact  Musculoskeletal: No embolic signs or cyanosis. Neuro: no focal deficits  Psychiatric: normal affect and mood, no memory impairment      Patient Vitals for the past 24 hrs:   BP Temp Pulse Resp SpO2   07/26/20 0807 137/78 98.5 °F (36.9 °C) (!) 123 20 95 %   07/26/20 0419 119/68 98.5 °F (36.9 °C) (!) 102 20 98 %   07/26/20 0013 128/59 98.5 °F (36.9 °C) (!) 107 18 100 %   07/25/20 2033     98 %   07/25/20 2003 105/55 98.2 °F (36.8 °C) (!) 109 18 98 %   07/25/20 1544 117/64 97.8 °F (36.6 °C) (!) 109 20 97 %   07/25/20 1208 96/53 98.3 °F (36.8 °C) (!) 105 20 99 %     Labs:    Recent Labs     07/26/20  0450  07/24/20  0438   WBC  --   --  12.5*   HGB  --   --  9.6*   PLT  --   --  352      < > 145   K 5.0   < > 4.6   *   < > 117*   CO2 23   < > 22   BUN 25*   < > 20   CREA 0.89   < > 1.02   *   < > 157*   TBILI 0.2   < > 0.2   ALT 80*   < > 41   *   < > 131    < > = values in this interval not displayed.          Marcelino Bishop, WALKER

## 2020-07-26 NOTE — PROGRESS NOTES
Hospitalist Progress Note     Admit Date:  2020  3:20 PM   Name:  Vinod Russell   Age:  81 y.o.  :  1935   MRN:  [id number]   PCP:  Sophia Penaloza MD  Treatment Team: Attending Provider: Dion Cox MD; Consulting Provider: Hamilton Sorto MD; Utilization Review: Silvia Sibley; Care Manager: Joe Rome    Subjective:   Patient is an 80yo M with a history of COPD, PAD, tophaceous gout, and alcohol abuse who presented to ER for sacral sore worsening for two weeks. 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 Surgery consulted who performed debridement on . Confirmed presence of fistula and osteomyelitis. Wound care consulted. PICC in place for TPN, nutrition following.       Charliecarlyn Melendez but found to be confused/mildly agitated again this morning  Not complaining of sacral pain. Pending diverting colostomy tomorrow. Continues TPN, taking some PO. Nutrition following. No fevers, still tachy.  BP better  Complaining of needs to void but has leyva draining well    They are aware objective:     Patient Vitals for the past 24 hrs:   Temp Pulse Resp BP SpO2   20 0807 98.5 °F (36.9 °C) (!) 123 20 137/78 95 %   20 0419 98.5 °F (36.9 °C) (!) 102 20 119/68 98 %   20 0013 98.5 °F (36.9 °C) (!) 107 18 128/59 100 %   20     98 %   20 98.2 °F (36.8 °C) (!) 109 18 105/55 98 %   20 1544 97.8 °F (36.6 °C) (!) 109 20 117/64 97 %   20 1208 98.3 °F (36.8 °C) (!) 105 20 96/53 99 %     Oxygen Therapy  O2 Sat (%): 95 % (20 0807)  Pulse via Oximetry: 108 beats per minute (20)  O2 Device: Nasal cannula (20)  O2 Flow Rate (L/min): 3 l/min (20)    Intake/Output Summary (Last 24 hours) at 2020 0906  Last data filed at 2020 0306  Gross per 24 hour   Intake 2982 ml   Output 5600 ml   Net -2618 ml         REVIEW OF SYSTEMS: Comprehensive ROS performed and negative except as stated in HPI. Physical Examination:  General:    Confused, mild agitation  Head:  NCAT  CV:   RRR/tachy  Lungs:   CTAB, no distress on RA  Abdomen:   +BS, soft, NTND  Extremities: tophi , no obvious acute gout  Skin:     No petechia or purpura. Dressing on sacral ulcer, not examined today  Neuro:  Alert, mild agitation, some shaking hands      Data Review:  I have reviewed all labs, meds, telemetry events, and studies from the last 24 hours. Recent Results (from the past 24 hour(s))   PLEASE READ & DOCUMENT PPD TEST IN 48 HRS    Collection Time: 07/25/20  1:06 PM   Result Value Ref Range    PPD      mm Induration     METABOLIC PANEL, COMPREHENSIVE    Collection Time: 07/26/20  4:50 AM   Result Value Ref Range    Sodium 144 136 - 145 mmol/L    Potassium 5.0 3.5 - 5.1 mmol/L    Chloride 116 (H) 98 - 107 mmol/L    CO2 23 21 - 32 mmol/L    Anion gap 5 (L) 7 - 16 mmol/L    Glucose 111 (H) 65 - 100 mg/dL    BUN 25 (H) 8 - 23 MG/DL    Creatinine 0.89 0.8 - 1.5 MG/DL    GFR est AA >60 >60 ml/min/1.73m2    GFR est non-AA >60 >60 ml/min/1.73m2    Calcium 8.7 8.3 - 10.4 MG/DL    Bilirubin, total 0.2 0.2 - 1.1 MG/DL    ALT (SGPT) 80 (H) 12 - 65 U/L    AST (SGOT) 100 (H) 15 - 37 U/L    Alk.  phosphatase 181 (H) 50 - 136 U/L    Protein, total 6.4 6.3 - 8.2 g/dL    Albumin 1.2 (L) 3.2 - 4.6 g/dL    Globulin 5.2 (H) 2.3 - 3.5 g/dL    A-G Ratio 0.2 (L) 1.2 - 3.5          All Micro Results     Procedure Component Value Units Date/Time    CULTURE, BLOOD [[id number]] Collected:  07/21/20 2015    Order Status:  Completed Specimen:  Blood Updated:  07/26/20 0644     Special Requests: --        LEFT  Antecubital       Culture result: NO GROWTH 5 DAYS       CULTURE, URINE [[id number]] Collected:  07/21/20 1815    Order Status:  Canceled Specimen:  Cath Urine           Current Meds:  Current Facility-Administered Medications   Medication Dose Route Frequency    vancomycin (VANCOCIN) 1,000 mg in 0.9% sodium chloride (MBP/ADV) 250 mL  1,000 mg IntraVENous Q18H    TPN ADULT-CENTRAL - dextrose 15% amino acid 5%   IntraVENous QPM    ondansetron (ZOFRAN) injection 4 mg  4 mg IntraVENous A1I PRN    folic acid (FOLVITE) tablet 1 mg  1 mg Oral DAILY    thiamine HCL (B-1) tablet 100 mg  100 mg Oral DAILY    fat emulsion 20% (LIPOSYN, INTRAlipid) infusion 250 mL  250 mL IntraVENous QPM    glycopyrrolate-formoterol (BEVESPI AEROSPHERE) 9 mcg-4.8 mcg inhaler  2 Puff Inhalation BID RT    sodium hypochlorite (QUARTER STRENGTH DAKIN'S) 0.125% irrigation (bottle)   Topical BID    NUTRITIONAL SUPPORT ELECTROLYTE PRN ORDERS   Does Not Apply PRN    central line flush (saline) syringe 20 mL  20 mL InterCATHeter Q8H    sodium chloride (NS) flush 5-40 mL  5-40 mL IntraVENous Q8H    sodium chloride (NS) flush 5-40 mL  5-40 mL IntraVENous PRN    enoxaparin (LOVENOX) injection 40 mg  40 mg SubCUTAneous Q24H    [Held by provider] clopidogreL (PLAVIX) tablet 75 mg  75 mg Oral DAILY    albuterol (PROVENTIL VENTOLIN) nebulizer solution 2.5 mg  2.5 mg Nebulization Q4H PRN    [Held by provider] dilTIAZem ER (CARDIZEM CD) capsule 120 mg  120 mg Oral DAILY    probenecid-colchicine (ColBenemid) tablet 1 Tab (Patient Supplied)  1 Tab Oral DAILY    piperacillin-tazobactam (ZOSYN) 3.375 g in 0.9% sodium chloride (MBP/ADV) 100 mL  3.375 g IntraVENous Q8H    LORazepam (ATIVAN) tablet 1 mg  1 mg Oral Q6H PRN    morphine injection 2 mg  2 mg IntraVENous Q4H PRN    acetaminophen (TYLENOL) tablet 650 mg  650 mg Oral Q4H PRN       Diet:  DIET NUTRITIONAL SUPPLEMENTS  DIET NUTRITIONAL SUPPLEMENTS  DIET REGULAR    Other Studies (last 24 hours):  No results found.     Assessment and Plan:     Hospital Problems as of 7/26/2020 Date Reviewed: 7/20/2020          Codes Class Noted - Resolved POA    Hypomagnesemia ICD-10-CM: H23.74  ICD-9-CM: 275.2  7/22/2020 - Present Unknown        * (Principal) Severe sepsis ([facility] [address] ICD-10-CM: A41.9, R65.20  ICD-9-CM: 038.9, 995.92 7/21/2020 - Present Unknown        Pressure injury of deep tissue of sacral region ICD-10-CM: L89.156  ICD-9-CM: 707.03, 707.20  7/21/2020 - Present Unknown        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-CM: K60.4  ICD-9-CM: 565.1  7/21/2020 - Present Unknown        Hyponatremia ICD-10-CM: E87.1  ICD-9-CM: 276.1  7/21/2020 - Present Unknown        Hypokalemia ICD-10-CM: E87.6  ICD-9-CM: 276.8  7/21/2020 - Present Unknown        Acute kidney injury (MARY) with acute tubular necrosis (ATN) (HCC) ICD-10-CM: N17.0  ICD-9-CM: 584.5  7/21/2020 - Present Unknown        Chronic hypoxemic respiratory failure (HCC) ICD-10-CM: J96.11  ICD-9-CM: 518.83, 799.02  7/21/2020 - Present Unknown        Normocytic anemia ICD-10-CM: D64.9  ICD-9-CM: 285.9  7/21/2020 - Present Unknown        Severe protein-calorie malnutrition (HCC) (Chronic) ICD-10-CM: E43  ICD-9-CM: 262  7/21/2020 - Present Yes        Chronic obstructive pulmonary disease ([facility] [address] ICD-10-CM: J44.9  ICD-9-CM: 211  6/22/2020 - Present Yes        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  ICD-9-CM: 274.03  5/18/2018 - Present Unknown        Atherosclerosis of native artery of extremity with intermittent claudication (HCC) (Chronic) ICD-10-CM: J77.203  ICD-9-CM: 440.21  5/13/2014 - Present Yes        Former cigarette smoker ICD-10-CM: T88.632  ICD-9-CM: V15.82  5/13/2014 - Present Yes        HTN (hypertension) (Chronic) ICD-10-CM: I10  ICD-9-CM: 401.9  5/13/2014 - Present Yes              A/P:    1. Severe sepsis secondary to sacral decubitus/rectocutaneous fistula. Continue vanc/zosyn. S/p debridement. Single blood culture on admit, NGTD. No operative culture available. Vitals/MARY improved  Planned diverting colostomy  BP better, restart diltiazem     2. Acute kidney injury - resolved      3. Hyponatremia hypokalemia. Resolvedrepleted     4. 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    Significant protein and total calorie malnutritionappreciate nutrition managementTPN and monitor intake     6.  alcohol abuse - thiamin/folate PO. PRN ativan if withdrawal signs - might account for confusing/shaking today    7. Peripheral vascular disease continue home Plavixhave held Pletal for now. This is unchanged  8. History of oxygen dependent COPDcontinue PRN albuterol and oxygen as needed. Continuing with substitutes for home meds.     Discharge planning: Skilled nursing facility/LTAC  DVT ppx: Lovenox     Code status: Full code  Decision Maker: Selfoldest child is Mendez Bocanegra [phone]

## 2020-07-27 ENCOUNTER — ANESTHESIA (OUTPATIENT)
Dept: SURGERY | Age: 85
DRG: 853 | End: 2020-07-27
Payer: MEDICARE

## 2020-07-27 PROBLEM — G93.41 METABOLIC ENCEPHALOPATHY: Status: ACTIVE | Noted: 2020-07-27

## 2020-07-27 PROBLEM — R57.9 SHOCK, UNSPECIFIED (HCC): Status: ACTIVE | Noted: 2020-07-27

## 2020-07-27 LAB
ALBUMIN SERPL-MCNC: 2 G/DL (ref 3.2–4.6)
ALBUMIN/GLOB SERPL: 0.4 {RATIO} (ref 1.2–3.5)
ALP SERPL-CCNC: 165 U/L (ref 50–136)
ALT SERPL-CCNC: 64 U/L (ref 12–65)
ANION GAP SERPL CALC-SCNC: 8 MMOL/L (ref 7–16)
ARTERIAL PATENCY WRIST A: YES
AST SERPL-CCNC: 73 U/L (ref 15–37)
BASE DEFICIT BLD-SCNC: 1 MMOL/L
BDY SITE: ABNORMAL
BILIRUB SERPL-MCNC: 0.2 MG/DL (ref 0.2–1.1)
BUN SERPL-MCNC: 26 MG/DL (ref 8–23)
CALCIUM SERPL-MCNC: 9.5 MG/DL (ref 8.3–10.4)
CHLORIDE SERPL-SCNC: 114 MMOL/L (ref 98–107)
CO2 BLD-SCNC: 23 MMOL/L
CO2 SERPL-SCNC: 24 MMOL/L (ref 21–32)
COLLECT TIME,HTIME: 315
CREAT SERPL-MCNC: 0.85 MG/DL (ref 0.8–1.5)
ERYTHROCYTE [DISTWIDTH] IN BLOOD BY AUTOMATED COUNT: 14.7 % (ref 11.9–14.6)
EXHALED MINUTE VOLUME, VE: 14.6 L/MIN
GAS FLOW.O2 O2 DELIVERY SYS: ABNORMAL L/MIN
GAS FLOW.O2 SETTING OXYMISER: 8 BPM
GLOBULIN SER CALC-MCNC: 5.1 G/DL (ref 2.3–3.5)
GLUCOSE BLD STRIP.AUTO-MCNC: 127 MG/DL (ref 65–100)
GLUCOSE SERPL-MCNC: 151 MG/DL (ref 65–100)
HCO3 BLD-SCNC: 21.9 MMOL/L (ref 22–26)
HCT VFR BLD AUTO: 30 % (ref 41.1–50.3)
HGB BLD-MCNC: 9.4 G/DL (ref 13.6–17.2)
INSPIRATION.DURATION SETTING TIME VENT: 0.9 SEC
MAGNESIUM SERPL-MCNC: 2.5 MG/DL (ref 1.8–2.4)
MCH RBC QN AUTO: 31.3 PG (ref 26.1–32.9)
MCHC RBC AUTO-ENTMCNC: 31.3 G/DL (ref 31.4–35)
MCV RBC AUTO: 100 FL (ref 79.6–97.8)
NRBC # BLD: 0 K/UL (ref 0–0.2)
O2/TOTAL GAS SETTING VFR VENT: 21 %
PCO2 BLD: 30.4 MMHG (ref 35–45)
PEEP RESPIRATORY: 10 CMH2O
PH BLD: 7.47 [PH] (ref 7.35–7.45)
PHOSPHATE SERPL-MCNC: 3.1 MG/DL (ref 2.3–3.7)
PIP ISTAT,IPIP: 19
PLATELET # BLD AUTO: 289 K/UL (ref 150–450)
PMV BLD AUTO: 8.9 FL (ref 9.4–12.3)
PO2 BLD: 77 MMHG (ref 75–100)
POTASSIUM SERPL-SCNC: 3.8 MMOL/L (ref 3.5–5.1)
PROT SERPL-MCNC: 7.1 G/DL (ref 6.3–8.2)
RBC # BLD AUTO: 3 M/UL (ref 4.23–5.6)
SAO2 % BLD: 96 % (ref 95–98)
SERVICE CMNT-IMP: ABNORMAL
SERVICE CMNT-IMP: ABNORMAL
SODIUM SERPL-SCNC: 146 MMOL/L (ref 136–145)
SPECIMEN TYPE: ABNORMAL
VT SETTING VENT: 734 ML
WBC # BLD AUTO: 13.6 K/UL (ref 4.3–11.1)

## 2020-07-27 PROCEDURE — 84100 ASSAY OF PHOSPHORUS: CPT

## 2020-07-27 PROCEDURE — 94640 AIRWAY INHALATION TREATMENT: CPT

## 2020-07-27 PROCEDURE — 85027 COMPLETE CBC AUTOMATED: CPT

## 2020-07-27 PROCEDURE — 77010033678 HC OXYGEN DAILY

## 2020-07-27 PROCEDURE — 74011250636 HC RX REV CODE- 250/636: Performed by: FAMILY MEDICINE

## 2020-07-27 PROCEDURE — 82803 BLOOD GASES ANY COMBINATION: CPT

## 2020-07-27 PROCEDURE — 74011000250 HC RX REV CODE- 250: Performed by: INTERNAL MEDICINE

## 2020-07-27 PROCEDURE — 99233 SBSQ HOSP IP/OBS HIGH 50: CPT | Performed by: INTERNAL MEDICINE

## 2020-07-27 PROCEDURE — 74011250637 HC RX REV CODE- 250/637: Performed by: INTERNAL MEDICINE

## 2020-07-27 PROCEDURE — 80202 ASSAY OF VANCOMYCIN: CPT

## 2020-07-27 PROCEDURE — 74011250637 HC RX REV CODE- 250/637: Performed by: FAMILY MEDICINE

## 2020-07-27 PROCEDURE — 82962 GLUCOSE BLOOD TEST: CPT

## 2020-07-27 PROCEDURE — 74011250636 HC RX REV CODE- 250/636: Performed by: SURGERY

## 2020-07-27 PROCEDURE — 94760 N-INVAS EAR/PLS OXIMETRY 1: CPT

## 2020-07-27 PROCEDURE — 83735 ASSAY OF MAGNESIUM: CPT

## 2020-07-27 PROCEDURE — 74011000258 HC RX REV CODE- 258: Performed by: FAMILY MEDICINE

## 2020-07-27 PROCEDURE — 92610 EVALUATE SWALLOWING FUNCTION: CPT

## 2020-07-27 PROCEDURE — 74011250636 HC RX REV CODE- 250/636: Performed by: INTERNAL MEDICINE

## 2020-07-27 PROCEDURE — 36600 WITHDRAWAL OF ARTERIAL BLOOD: CPT

## 2020-07-27 PROCEDURE — 74011000250 HC RX REV CODE- 250: Performed by: SURGERY

## 2020-07-27 PROCEDURE — 80053 COMPREHEN METABOLIC PANEL: CPT

## 2020-07-27 PROCEDURE — 74011000258 HC RX REV CODE- 258: Performed by: INTERNAL MEDICINE

## 2020-07-27 PROCEDURE — 74011000258 HC RX REV CODE- 258: Performed by: SURGERY

## 2020-07-27 PROCEDURE — 65270000029 HC RM PRIVATE

## 2020-07-27 PROCEDURE — 97530 THERAPEUTIC ACTIVITIES: CPT

## 2020-07-27 RX ORDER — HEPARIN 100 UNIT/ML
300 SYRINGE INTRAVENOUS
Status: DISCONTINUED | OUTPATIENT
Start: 2020-07-27 | End: 2020-07-29 | Stop reason: HOSPADM

## 2020-07-27 RX ORDER — CALCIUM CARBONATE 200(500)MG
200 TABLET,CHEWABLE ORAL
Status: DISCONTINUED | OUTPATIENT
Start: 2020-07-27 | End: 2020-07-29 | Stop reason: HOSPADM

## 2020-07-27 RX ORDER — ALBUTEROL SULFATE 90 UG/1
2 AEROSOL, METERED RESPIRATORY (INHALATION)
Status: DISCONTINUED | OUTPATIENT
Start: 2020-07-27 | End: 2020-07-29 | Stop reason: ALTCHOICE

## 2020-07-27 RX ADMIN — DILTIAZEM HYDROCHLORIDE 120 MG: 120 CAPSULE, COATED, EXTENDED RELEASE ORAL at 08:30

## 2020-07-27 RX ADMIN — ALBUTEROL SULFATE 2.5 MG: 2.5 SOLUTION RESPIRATORY (INHALATION) at 03:19

## 2020-07-27 RX ADMIN — ALBUTEROL SULFATE 2 PUFF: 108 AEROSOL, METERED RESPIRATORY (INHALATION) at 22:12

## 2020-07-27 RX ADMIN — PROBENECID AND COLCHICINE 1 TABLET: 500; .5 TABLET ORAL at 08:30

## 2020-07-27 RX ADMIN — PIPERACILLIN AND TAZOBACTAM 3.38 G: 3; .375 INJECTION, POWDER, FOR SOLUTION INTRAVENOUS at 16:12

## 2020-07-27 RX ADMIN — Medication 10 ML: at 16:12

## 2020-07-27 RX ADMIN — FOLIC ACID 1 MG: 1 TABLET ORAL at 08:30

## 2020-07-27 RX ADMIN — Medication 10 ML: at 05:05

## 2020-07-27 RX ADMIN — Medication 100 MG: at 08:30

## 2020-07-27 RX ADMIN — VANCOMYCIN HYDROCHLORIDE 1000 MG: 1 INJECTION, POWDER, LYOPHILIZED, FOR SOLUTION INTRAVENOUS at 23:37

## 2020-07-27 RX ADMIN — ALBUTEROL SULFATE 2.5 MG: 2.5 SOLUTION RESPIRATORY (INHALATION) at 00:20

## 2020-07-27 RX ADMIN — Medication 20 ML: at 06:00

## 2020-07-27 RX ADMIN — ALBUTEROL SULFATE 2 PUFF: 108 AEROSOL, METERED RESPIRATORY (INHALATION) at 11:36

## 2020-07-27 RX ADMIN — ENOXAPARIN SODIUM 40 MG: 40 INJECTION SUBCUTANEOUS at 20:00

## 2020-07-27 RX ADMIN — CALCIUM CARBONATE (ANTACID) CHEW TAB 500 MG 200 MG: 500 CHEW TAB at 11:20

## 2020-07-27 RX ADMIN — DAKIN'S SOLUTION 0.125% (QUARTER STRENGTH): 0.12 SOLUTION at 08:30

## 2020-07-27 RX ADMIN — ALBUTEROL SULFATE 2.5 MG: 2.5 SOLUTION RESPIRATORY (INHALATION) at 07:53

## 2020-07-27 RX ADMIN — VANCOMYCIN HYDROCHLORIDE 1000 MG: 1 INJECTION, POWDER, LYOPHILIZED, FOR SOLUTION INTRAVENOUS at 05:04

## 2020-07-27 RX ADMIN — Medication 20 ML: at 22:00

## 2020-07-27 RX ADMIN — Medication 20 ML: at 14:12

## 2020-07-27 RX ADMIN — Medication 10 ML: at 22:00

## 2020-07-27 RX ADMIN — CALCIUM CARBONATE (ANTACID) CHEW TAB 500 MG 200 MG: 500 CHEW TAB at 17:02

## 2020-07-27 RX ADMIN — PIPERACILLIN AND TAZOBACTAM 3.38 G: 3; .375 INJECTION, POWDER, FOR SOLUTION INTRAVENOUS at 08:30

## 2020-07-27 NOTE — PROGRESS NOTES
ICU Daily progress NOTE    Gissel Franco    7/27/2020     The patient is an 81 y.o.  male seen and evaluated at the request of Dr. Yuliana Ponce for assistance with management of respiratory failure. He has a hx of COPD, PVD, gout, CKD, HTN, alcohol use, and malnutrition presenting with sepsis from a deep sacral decubitus with rectocutaneous fistula. He underwent surgery by Dr. Kayleen Mata on 7/23 with wound debridement and evidence of osteomyelitis of coccygeal bone. He was started on TPN via a PICC line. He initially did well with plans for a diverting colostomy this coming week. Today he became confused and agitated and then obtunded. He was seen by the hospitalist and transfer to the ICU arranged for BIPAP. Because of bilateral pulmonary infiltrates, COVID testing was felt necessary. He has been on Vanc and zosyn since admit with no fevers reported. Date of Admission:  7/21/2020    The patient's chart is reviewed and the patient is discussed with the staff. Subjective: On NC and using BIPAP for treatment and using QHS. Patient apparently came down to ICU since was lethargic and confuses/hypotensive. Now awake, but waxing and waning mental status. On 3 LPM now. Did go on BIPAP for nebulizer treatment to decrease risk of COVID spread      Review of Systems  Limited since going back to sleep mid conversation  -pain  +weakness  +poor po intake  +soreness of backside from wound  +edema  Other systems grossly normal for 12 systems       Patient Active Problem List   Diagnosis Code    Atherosclerosis of native artery of extremity with intermittent claudication ([facility] [address] I70.219    Former cigarette smoker Z87.891    HTN (hypertension) I10    Hyperlipidemia E78.5    Gout M10.9    Peripheral vascular disease ([address] I73.9    Tophaceous gout M1A. 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    Severe sepsis (HCC) A41.9, R65.20    Pressure injury of deep tissue of sacral region L89.156    Rectocutaneous fistula K60.4    Hyponatremia E87.1    Hypokalemia E87.6    Acute kidney injury (MARY) with acute tubular necrosis (ATN) ([facility]) N17.0    Acute on chronic respiratory failure with hypoxia and hypercapnia ([facility]) J96.21, J96.22    Normocytic anemia D64.9    Severe protein-calorie malnutrition (HCC) E43    Hypomagnesemia E83.42    Hyperchloremic acidosis E87.2    Hypoproteinemia ([facility]) E77.8    Hyperkalemia E87.5    Shock, unspecified ([facility]) U85.6    Metabolic encephalopathy J62.72           Prior to Admission Medications   Prescriptions Last Dose Informant Patient Reported? Taking?   acetaminophen (TYLENOL) 500 mg tablet   Yes Yes   Sig: Take 1,000 mg by mouth every six (6) hours as needed for Pain. albuterol (ProAir HFA) 90 mcg/actuation inhaler   No No   Sig: Take 2 Puffs by inhalation every four (4) hours as needed for Wheezing or Shortness of Breath. albuterol (ProAir HFA) 90 mcg/actuation inhaler   No Yes   Sig: Take 2 Puffs by inhalation every six (6) hours as needed for Wheezing or Shortness of Breath. cilostazoL (PLETAL) 100 mg tablet   Yes Yes   Sig: Take  by mouth Before breakfast and dinner. clopidogreL (Plavix) 75 mg tab   Yes Yes   Sig: Take 75 mg by mouth. dilTIAZem CD (CARDIZEM CD) 120 mg ER capsule   No Yes   Sig: Take 1 Cap by mouth daily. doxycycline (MONODOX) 100 mg capsule   No No   Sig: Take 1 Cap by mouth two (2) times a day for 7 days. furosemide (Lasix) 40 mg tablet   No Yes   Sig: Take 1 Tab by mouth daily. probenecid-colchicine (ColBenemid) 500-0.5 mg per tablet   No Yes   Sig: TAKE ONE TABLET BY MOUTH DAILY   umeclidinium-vilanteroL (Anoro Ellipta) 62.5-25 mcg/actuation inhaler   No Yes   Sig: Take 1 Puff by inhalation daily. Facility-Administered Medications: None       Past Medical History:   Diagnosis Date    Atherosclerosis of native arteries of the extremities with intermittent claudication 5/13/2014    Chronic kidney disease     elevated labs see's dr Adelaida Burnett ulcer ([address] [facility] 9/29/2017    Former cigarette smoker 5/13/2014    Gout     HTN (hypertension) 5/13/2014    Hypercholesterolemia     Hyperlipidemia 5/13/2014    Peripheral vascular disease ([address]      Past Surgical History:   Procedure Laterality Date    HX COLONOSCOPY      HX HEMORRHOIDECTOMY      HX MALIGNANT SKIN LESION EXCISION       Social History     Socioeconomic History    Marital status:      Spouse name: Not on file    Number of children: Not on file    Years of education: Not on file    Highest education level: Not on file   Occupational History    Not on file   Social Needs    Financial resource strain: Not on file    Food insecurity     Worry: Not on file     Inability: Not on file   [employer] needs     Medical: Not on file     Non-medical: Not on file   Tobacco Use    Smoking status: Former Smoker     Packs/day: 2.00     Years: 25.00     Pack years: 50.00     Types: Cigarettes    Smokeless tobacco: Former User   Substance and Sexual Activity    Alcohol use:  Yes     Alcohol/week: 6.7 standard drinks     Types: 8 Shots of liquor per week    Drug use: Not on file    Sexual activity: Not on file   Lifestyle    Physical activity     Days per week: Not on file     Minutes per session: Not on file    Stress: Not on file   Relationships    Social connections     Talks on phone: Not on file     Gets together: Not on file     Attends Zoroastrian service: Not on file     Active member of club or organization: Not on file     Attends meetings of clubs or organizations: Not on file     Relationship status: Not on file    Intimate partner violence     Fear of current or ex partner: Not on file     Emotionally abused: Not on file Physically abused: Not on file     Forced sexual activity: Not on file   Other Topics Concern    Not on file   Social History Narrative    Not on file     Family History   Problem Relation Age of Onset    Hypertension Mother     Breast Cancer Mother      Allergies   Allergen Reactions    Aspirin Swelling    Prednisone Myalgia       Current Facility-Administered Medications   Medication Dose Route Frequency    LORazepam (ATIVAN) injection 1 mg  1 mg IntraVENous Q6H PRN    TPN ADULT-CENTRAL - dextrose 15% amino acid 5%   IntraVENous QPM    NOREPINephrine (LEVOPHED) 4 mg in 5% dextrose 250 mL infusion  0.5-30 mcg/min IntraVENous TITRATE    albuterol (PROVENTIL VENTOLIN) nebulizer solution 2.5 mg  2.5 mg Nebulization Q4H RT    vancomycin (VANCOCIN) 1,000 mg in 0.9% sodium chloride (MBP/ADV) 250 mL  1,000 mg IntraVENous Q18H    ondansetron (ZOFRAN) injection 4 mg  4 mg IntraVENous X3R PRN    folic acid (FOLVITE) tablet 1 mg  1 mg Oral DAILY    thiamine HCL (B-1) tablet 100 mg  100 mg Oral DAILY    fat emulsion 20% (LIPOSYN, INTRAlipid) infusion 250 mL  250 mL IntraVENous QPM    sodium hypochlorite (QUARTER STRENGTH DAKIN'S) 0.125% irrigation (bottle)   Topical BID    NUTRITIONAL SUPPORT ELECTROLYTE PRN ORDERS   Does Not Apply PRN    central line flush (saline) syringe 20 mL  20 mL InterCATHeter Q8H    sodium chloride (NS) flush 5-40 mL  5-40 mL IntraVENous Q8H    sodium chloride (NS) flush 5-40 mL  5-40 mL IntraVENous PRN    enoxaparin (LOVENOX) injection 40 mg  40 mg SubCUTAneous Q24H    [Held by provider] clopidogreL (PLAVIX) tablet 75 mg  75 mg Oral DAILY    albuterol (PROVENTIL VENTOLIN) nebulizer solution 2.5 mg  2.5 mg Nebulization Q4H PRN    dilTIAZem ER (CARDIZEM CD) capsule 120 mg  120 mg Oral DAILY    probenecid-colchicine (ColBenemid) tablet 1 Tab (Patient Supplied)  1 Tab Oral DAILY    piperacillin-tazobactam (ZOSYN) 3.375 g in 0.9% sodium chloride (MBP/ADV) 100 mL  3.375 g IntraVENous Q8H    LORazepam (ATIVAN) tablet 1 mg  1 mg Oral Q6H PRN    morphine injection 2 mg  2 mg IntraVENous Q4H PRN    acetaminophen (TYLENOL) tablet 650 mg  650 mg Oral Q4H PRN         Objective:     Vitals:    07/27/20 0631 07/27/20 0646 07/27/20 0701 07/27/20 0753   BP: 134/77 142/82 150/77    Pulse: 100 (!) 101 (!) 101    Resp: 25 23 27    Temp:       SpO2: 96% 97% 97% 96%   Weight:    182 lb 11.2 oz (82.9 kg)   Height:           PHYSICAL EXAM     Constitutional:  the patient is well developed and in no acute distress on NC at 3 L and mental status is waxing and waning. EENMT:  Sclera clear, pupils equal, oral mucosa moist  Respiratory: Decreased BS bilaterally  Cardiovascular:  RRR without M,G,R  Gastrointestinal: soft and non-tender; with positive bowel sounds. Musculoskeletal: warm without cyanosis. There is trace lower extremity edema. Skin:  no jaundice or rashes, unable to visualize sacral wound due to critical condition  Neurologic: no gross neuro deficits     Psychiatric:  Waxing and wanning mental status but when alert was alert and oriented to person, place, time and concentration. CXR:            Recent Labs     07/27/20  0401 07/26/20  1430   WBC 13.6* 13.4*   HGB 9.4* 10.3*   HCT 30.0* 32.7*    324     Recent Labs     07/27/20  0401 07/26/20  1430 07/26/20  1345  07/25/20  0658   * 143 120*   < > 140   K 3.8 5.5* >10.0*   < > 4.5   * 117* 92*   < > 114*   * 119* 2,071*   < > 119*   CO2 24 23 18*   < > 19*   BUN 26* 26* 21   < > 20   CREA 0.85 0.86 1.44   < > 0.87   MG 2.5* 2.4  --   --  2.1   PHOS 3.1 3.6  --   --  4.0*   CA 9.5 8.6 7.2*   < > 8.9   ALB 2.0* 1.3* 1.0*   < > 1.2*   TBILI 0.2 0.2 0.1*   < > 0.1*   ALT 64 76* 54   < > 83*    < > = values in this interval not displayed.      Recent Labs     07/27/20  0320 07/26/20  1943 07/26/20  1429   PHI 7.47* 7.34* 7.25*   PCO2I 30.4* 40.3 50.8*   PO2I 77 152* 66*   HCO3I 21.9* 21.6* 22.4     Recent Labs 07/26/20  1726 07/26/20  1345   LAC 1.0 0.5       Assessment:  (Medical Decision Making)     Hospital Problems  Date Reviewed: 7/20/2020          Codes Class Noted POA    Hyperchloremic acidosis ICD-10-CM: E87.2  ICD-9-CM: 276.2  7/26/2020 Unknown    Resolving     Hypoproteinemia ([facility] ICD-10-CM: E77.8  ICD-9-CM: 273.8  7/26/2020 Unknown    Very severe and contributing to hypotension and likely interstitial edema. Hypomagnesemia ICD-10-CM: G10.63  ICD-9-CM: 275.2  7/22/2020 Unknown        * (Principal) Severe sepsis ([facility] ICD-10-CM: A41.9, R65.20  ICD-9-CM: 038.9, 995.92  7/21/2020 Unknown    Due to wound infection. Cannot exclude a developing pneumonia    Pressure injury of deep tissue of sacral region ICD-10-CM: L89.156  ICD-9-CM: 707.03, 707.20  7/21/2020 Unknown        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-CM: K60.4  ICD-9-CM: 565.1  7/21/2020 Unknown        Hyponatremia ICD-10-CM: E87.1  ICD-9-CM: 276.1  7/21/2020 Unknown    Resolved. Hypokalemia ICD-10-CM: E87.6  ICD-9-CM: 276.8  7/21/2020 Unknown        Acute kidney injury (MARY) with acute tubular necrosis (ATN) (HCC) ICD-10-CM: N17.0  ICD-9-CM: 584.5  7/21/2020 Unknown    Creatinine only 0.86    Acute on chronic respiratory failure with hypoxia and hypercapnia (HCC) ICD-10-CM: J96.21, J96.22  ICD-9-CM: 518.84, 786.09, 799.02  7/21/2020     On NC    Normocytic anemia ICD-10-CM: D64.9  ICD-9-CM: 285.9  7/21/2020 Unknown        Severe protein-calorie malnutrition (HCC) (Chronic) ICD-10-CM: C38  ICD-9-CM: 262  7/21/2020 Yes    Albumin 1.3    Chronic obstructive pulmonary disease ([address] ICD-10-CM: J44.9  ICD-9-CM: 496  6/22/2020 Yes    Severe by spirometry this year. 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  ICD-9-CM: 274.03  5/18/2018 Unknown        Atherosclerosis of native artery of extremity with intermittent claudication (HCC) (Chronic) ICD-10-CM: L90.409  ICD-9-CM: 440.21  5/13/2014 Yes        Former cigarette smoker ICD-10-CM: O27.514  ICD-9-CM: V15.82 5/13/2014 Yes        HTN (hypertension) (Chronic) ICD-10-CM: I10  ICD-9-CM: 401.9  5/13/2014 Yes              Plan:  (Medical Decision Making)     --taper oxygen  --check sugars since noted waxing and waning mental study  --off pressors eusebio keep MAP > 65  --f/u with surgery for ?intevention for decubitus  --Inhaled BD. --Await COVID results. --continue remaining treatment per PMD    Respiratory status and BP improved since came to ICU. Since no longer an issues, will be on Standby. Call if needed. More than 50% of the time documented was spent in face-to-face contact with the patient and in the care of the patient on the floor/unit where the patient is located.        Makayla Guidry MD

## 2020-07-27 NOTE — PROGRESS NOTES
During initial shift assessment and ABG check, patient was unresponsive and withdrawing from pain in all 4 extremities. ABG was improved in comparison to previous, however, there was a mental status change. Ammonia levels drawn. 1930 MAP was in the 50's. Levo titrated accordingly. BP increased and now patient will open eyes to voice. Still lethargic, but improved from shift assessment. Current BP is 115/53 MAP 73. SpO2 96% on BiPAP FiO2 40%. HR 98. Will continue to monitor for any changes.

## 2020-07-27 NOTE — PROGRESS NOTES
Problem: Dysphagia (Adult)  Goal: *Acute Goals and Plan of Care (Insert Text)  Description: LTG: Patient will tolerate least restrictive diet without overt signs or symptoms of airway compromise. STG: Patient will tolerate mechanical soft diet and thin liquids without overt signs or symptoms of airway compromise. STG: Patient will participate in modified barium swallow study as clinically indicated. Outcome: Progressing Towards Goal     SPEECH LANGUAGE PATHOLOGY: DYSPHAGIA- Initial Assessment    NAME/AGE/GENDER: Shelbie Dorado is a 81 y.o. male  DATE: 7/27/2020  PRIMARY DIAGNOSIS: Severe sepsis ([facility] [address] [A41.9, R65.20]  Procedure(s) (LRB):  SACRAL WOUND DEBRIDEMENT (N/A) 4 Days Post-Op  ICD-10: Treatment Diagnosis: R13.12 Dysphagia, Oropharyngeal Phase    RECOMMENDATIONS   DIET:   PO:  Mechanical soft  Liquids:  regular thin    MEDICATIONS:  1 at at time with thin liquid or floated in puree     ASPIRATION PRECAUTIONS  Slow rate of intake  Small bites/sips  Upright at 90 degrees during meal     COMPENSATORY STRATEGIES/MODIFICATIONS  None     EDUCATION:  Recommendations discussed with Nursing  Patient     CONTINUATION OF SKILLED SERVICES/MEDICAL NECESSITY:  Patient is expected to demonstrate progress in  swallow function, diet tolerance, and swallow safety in order to  improve swallow safety, work toward diet advancement, and decrease aspiration risk. Patient continues to require skilled intervention due to dysphagia. RECOMMENDATIONS for CONTINUED SPEECH THERAPY:   YES: Anticipate need for ongoing speech therapy during this hospitalization and at next level of care. ASSESSMENT   Patient presents with mild oral dysphagia and possible pharyngeal dysphagia. Limited assessment this date as patient with minimal intake despite encouragement. Mildly prolonged oral prep time with chewables due to limited dentition.  Delayed coughing approximately 20 seconds post swallow x1 with serial sips thin; however, no further overt s/sx airway compromise with thin by straw, mixed, or solid consistency. Recommend downgrade to mechanical soft diet. Continue thin liquids by single cup/straw sips. Meds 1 at a time with liquid rinse or floated in puree. Will continue to follow to assess diet tolerance. REHABILITATION POTENTIAL FOR STATED GOALS: Good    PLAN    FREQUENCY/DURATION: Continue to follow patient 2 times a week for duration of hospital stay to address above goals. - Recommendations for next treatment session: Next treatment will address diet tolerance. SUBJECTIVE   Patient alert upright in bed. Hard of hearing. Denies any trouble swallowing. Follows commands. Does not respond to open ended questions at time; however, suspect due to hearing loss. History of Present Injury/Illness: Mr. Edwige Cardoza  has a past medical history of Atherosclerosis of native arteries of the extremities with intermittent claudication (5/13/2014), Chronic kidney disease, Foot ulcer ([facility] [address] (9/29/2017), Former cigarette smoker (5/13/2014), Gout, HTN (hypertension) (5/13/2014), Hypercholesterolemia, Hyperlipidemia (5/13/2014), and Peripheral vascular disease ([facility] [address] . He also  has a past surgical history that includes hx hemorrhoidectomy; hx malignant skin lesion excision; and hx colonoscopy. Problem List:  (Impairments causing functional limitations):  Oropharyngeal dysphagia    Previous Dysphagia: NONE REPORTED  Diet Prior to Evaluation: Regular/thin    Orientation:   Person  Place  Time    Pain: Pain Scale 1: Numeric (0 - 10)  Pain Intensity 1: 0      OBJECTIVE   Oral Motor:   Labial: No impairment  Dentition: Natural and Limited  Oral Hygiene: Adequate  Lingual: No impairment    Swallow evaluation:   Patient consumed trials of thin straw/serial sips, mixed, and solid consistency. Declined cup sips saying straw were easier to access. Initially no overt s/sx airway compromise with solids or liquids.  Mildly prolonged, but functional oral prep time with chewables. Delayed coughing approximately 20 seconds post swallow with serial sips thin by straw. Patient adamantly refused further trials despite encouragement. INTERDISCIPLINARY COLLABORATION: Registered Nurse  PRECAUTIONS/ALLERGIES: Aspirin and Prednisone     Tool Used: Dysphagia Outcome and Severity Scale (ROLLY)    Score Comments   Normal Diet  [] 7 With no strategies or extra time needed   Functional Swallow  [] 6 May have mild oral or pharyngeal delay   Mild Dysphagia  [x] 5 Which may require one diet consistency restricted    Mild-Moderate Dysphagia  [] 4 With 1-2 diet consistencies restricted   Moderate Dysphagia  [] 3 With 2 or more diet consistencies restricted   Moderate-Severe Dysphagia  [] 2 With partial PO strategies (trials with ST only)   Severe Dysphagia  [] 1 With inability to tolerate any PO safely      Score:  Initial: 5 Most Recent: x (Date 07/27/20 )   Interpretation of Tool: The Dysphagia Outcome and Severity Scale (ROLLY) is a simple, easy-to-use, 7-point scale developed to systematically rate the functional severity of dysphagia based on objective assessment and make recommendations for diet level, independence level, and type of nutrition. Current Medications:   No current facility-administered medications on file prior to encounter. Current Outpatient Medications on File Prior to Encounter   Medication Sig Dispense Refill    furosemide (Lasix) 40 mg tablet Take 1 Tab by mouth daily. 30 Tab 1    umeclidinium-vilanteroL (Anoro Ellipta) 62.5-25 mcg/actuation inhaler Take 1 Puff by inhalation daily. 1 Inhaler 11    albuterol (ProAir HFA) 90 mcg/actuation inhaler Take 2 Puffs by inhalation every six (6) hours as needed for Wheezing or Shortness of Breath. 1 Inhaler 1    clopidogreL (Plavix) 75 mg tab Take 75 mg by mouth.      cilostazoL (PLETAL) 100 mg tablet Take  by mouth Before breakfast and dinner.       probenecid-colchicine (ColBenemid) 500-0.5 mg per tablet TAKE ONE TABLET BY MOUTH DAILY 90 Tab 3    dilTIAZem CD (CARDIZEM CD) 120 mg ER capsule Take 1 Cap by mouth daily. 90 Cap 3    doxycycline (MONODOX) 100 mg capsule Take 1 Cap by mouth two (2) times a day for 7 days. 14 Cap 0    albuterol (ProAir HFA) 90 mcg/actuation inhaler Take 2 Puffs by inhalation every four (4) hours as needed for Wheezing or Shortness of Breath.  1 Inhaler 0       After treatment position/precautions:  Upright in bed  RN notified  Call light within reach    Total Treatment Duration:   Time In: 3051  Time Out: Natan Glenn [address] [facility] [address], [address] [facility] [place]

## 2020-07-27 NOTE — PROGRESS NOTES
Changed Pt wound dressing per order. Pt tolerated procedure well. Wound edges red and rolled. Small amount serosanguinous drainage.

## 2020-07-27 NOTE — PROGRESS NOTES
Bedside, Verbal and Written shift change report given to Angela RN (oncoming nurse) by Joel RN (offgoing nurse). Report included the following information SBAR, Kardex, ED Summary, Procedure Summary, Intake/Output, MAR, Recent Results, Med Rec Status, Cardiac Rhythm NSR/ST and Alarm Parameters .

## 2020-07-27 NOTE — PROGRESS NOTES
Manav Holden, daughter, called for updates on patient. All questions answered and concerns addressed.      Manav Hatch: [phone]

## 2020-07-27 NOTE — PROGRESS NOTES
Patient off pressors and moved out of ICU  Will Plan colostomy and debridement in OR tomorrow.     Lance Maldonado, NP

## 2020-07-27 NOTE — PROGRESS NOTES
Scheduled for colostomy today- Will cancel for now  Chart reviewed of weekend events  Moved to ICU  Currently on Levo  Pneumonia    Will reassess for colostomy when more stable. Please continue dressing changes to wound at this time.     Apoorva Chavez NP

## 2020-07-27 NOTE — PROGRESS NOTES
Pt still very drowsy, however he is much more alert from initial assessment. Will answer questions appropriately and follow some commands but seems to be very weak when asked to move any limbs and gets tired quickly after answering many questions. States he is not in pain but he feels tired.

## 2020-07-27 NOTE — PROGRESS NOTES
Problem: Mobility Impaired (Adult and Pediatric)  Goal: *Acute Goals and Plan of Care (Insert Text)  Outcome: Progressing Towards Goal  Note: LTG:  (1.)Mr. Hayley Rodriguez will move from supine to sit and sit to supine , scoot up and down, and roll side to side in bed with STAND BY ASSIST within 7 treatment day(s). (2.)Mr. Hayley Rodriguez will transfer from bed to chair and chair to bed with CONTACT GUARD ASSIST using the least restrictive device within 7 treatment day(s). (3.)Mr. Hayley Rodriguez will ambulate with CONTACT GUARD ASSIST for 40 feet with the least restrictive device within 7 treatment day(s). (4.)Mr. Hayley Rodriguez will tolerate at least 8 min of dynamic standing activity to assist standing ADLs with the least restrictive device within 7 treatment days. PHYSICAL THERAPY: Daily Note and PM 7/27/2020  INPATIENT: PT Visit Days : 3  [place] / [place] / Product Type: Medicare /       NAME/AGE/GENDER: Chana Zayas is a 81 y.o. male   PRIMARY DIAGNOSIS: Severe sepsis ([facility] [address] [A41.9, R65.20] Severe sepsis ([facility] [address] Severe sepsis (HCC)  Procedure(s) (LRB):  SACRAL WOUND DEBRIDEMENT (N/A)  4 Days Post-Op  ICD-10: Treatment Diagnosis:    · Generalized Muscle Weakness (M62.81)  · Other lack of cordination (R27.8)  · Difficulty in walking, Not elsewhere classified (R26.2)  · History of falling (Z91.81)   Precaution/Allergies:  Aspirin and Prednisone      ASSESSMENT:     Mr. Hayley Rodriguez is an 81year old M who presents for severe sepsis. Prior to hospital admission pt lives with his adult grandson in 1 story home with 3 step(s) to enter to backdoor. Pt endorses 1 fall in past 6 months. Prior to admission Mr. Hayley Rodriguez uses a RW for mobility. Patient was supine upon contact and agreeable to PT. There is a profound sacral wound with dressing. Patient performs supine to sit with MOD assist, additional time, and cues for proper technique. Patient had more difficulty due to wound bed.  Patient sits EOB where he performs transfer training x 3 reps needing MOD A x 1 assist and cues for proper technique. Once standing patient demonstrates fair static standing balance. He ambulates a few steps next to bed with the 2 wheel rolling walker. His dynamic standing balance is poor to fair. Patient returns to supine with mod assist and cues for technique. Overall slow progress towards physical therapy goals. Patient's goals listed above are still appropriate. Will continue skilled PT to address remaining deficits. This section established at most recent assessment   PROBLEM LIST (Impairments causing functional limitations):  1. Decreased Strength  2. Decreased ADL/Functional Activities  3. Decreased Transfer Abilities  4. Decreased Ambulation Ability/Technique  5. Decreased Balance  6. Increased Pain   INTERVENTIONS PLANNED: (Benefits and precautions of physical therapy have been discussed with the patient.)  1. Balance Exercise  2. Bed Mobility  3. Electrical Stimulation  4. Family Education  5. Gait Training  6. Manual Therapy  7. Neuromuscular Re-education/Strengthening  8. Range of Motion (ROM)  9. Therapeutic Activites  10. Therapeutic Exercise/Strengthening  11. Transfer Training     TREATMENT PLAN: Frequency/Duration: 3 times a week for duration of hospital stay  Rehabilitation Potential For Stated Goals: Good     REHAB RECOMMENDATIONS (at time of discharge pending progress):    Placement: It is my opinion, based on this patient's performance to date, that Mr. Katarzyna Whitney may benefit from intensive therapy at a [address] [place] [address] after discharge due to the functional deficits listed above that are likely to improve with skilled rehabilitation and concerns that he/she may be unsafe to be unsupervised at home due to fall history and poor  functional mobility .   Equipment:    None at this time              HISTORY:   History of Present Injury/Illness (Reason for Referral):  See H&P  Past Medical History/Comorbidities:   Mr. Dwight Marie  has a past medical history of Atherosclerosis of native arteries of the extremities with intermittent claudication (5/13/2014), Chronic kidney disease, Foot ulcer ([facility] [address] (9/29/2017), Former cigarette smoker (5/13/2014), Gout, HTN (hypertension) (5/13/2014), Hypercholesterolemia, Hyperlipidemia (5/13/2014), and Peripheral vascular disease ([facility] [address] Mr. Dwight Marie  has a past surgical history that includes hx hemorrhoidectomy; hx malignant skin lesion excision; and hx colonoscopy. Social History/Living Environment:   Home Environment: Private residence  One/Two Story Residence: One story  Living Alone: No  Support Systems: Family member(s)  Patient Expects to be Discharged to[de-identified] Unknown  Current DME Used/Available at Home: Oxygen, portable, Shower chair, Walker, rollator  Prior Level of Function/Work/Activity:  Mod I household ambulator w/ RW     Number of Personal Factors/Comorbidities that affect the Plan of Care: 1-2: MODERATE COMPLEXITY   EXAMINATION:   Most Recent Physical Functioning:   Gross Assessment:                  Posture:  Posture (WDL): Exceptions to WDL  Posture Assessment: Trunk flexion  Balance:  Sitting: Intact  Standing: Impaired  Standing - Static: Fair  Standing - Dynamic : Fair Bed Mobility:  Rolling: Moderate assistance  Supine to Sit: Moderate assistance  Sit to Supine: Moderate assistance  Scooting: Moderate assistance  Wheelchair Mobility:     Transfers:  Sit to Stand: Moderate assistance  Stand to Sit: Moderate assistance  Gait:     Base of Support: Center of gravity altered  Speed/She: Delayed; Fluctuations; Pace decreased (<100 feet/min); Shuffled  Step Length: Left shortened;Right shortened  Swing Pattern: Left asymmetrical;Right asymmetrical  Stance: Left decreased;Right decreased;Time;Weight shift  Gait Abnormalities: Decreased step clearance  Distance (ft): 3 Feet (ft)  Assistive Device: Walker, rolling  Ambulation - Level of Assistance:  Moderate assistance  Interventions: Manual cues; Safety awareness training; Tactile cues; Verbal cues; Visual/Demos      Body Structures Involved:  1. Bones  2. Joints  3. Muscles Body Functions Affected:  1. Neuromusculoskeletal  2. Movement Related  3. Skin Related Activities and Participation Affected:  1. Learning and Applying Knowledge  2. General Tasks and Demands  3. Communication  4. Mobility  5. Self Care  6. Domestic Life  7. Interpersonal Interactions and Relationships   Number of elements that affect the Plan of Care: 3: MODERATE COMPLEXITY   CLINICAL PRESENTATION:   Presentation: Stable and uncomplicated: LOW COMPLEXITY   CLINICAL DECISION MAKIN[address] [place] [address] AM-PAC 6 Clicks   Basic Mobility Inpatient Short Form  How much difficulty does the patient currently have. .. Unable A Lot A Little None   1. Turning over in bed (including adjusting bedclothes, sheets and blankets)? [] 1   [] 2   [x] 3   [] 4   2. Sitting down on and standing up from a chair with arms ( e.g., wheelchair, bedside commode, etc.)   [] 1   [x] 2   [] 3   [] 4   3. Moving from lying on back to sitting on the side of the bed? [] 1   [x] 2   [] 3   [] 4   How much help from another person does the patient currently need. .. Total A Lot A Little None   4. Moving to and from a bed to a chair (including a wheelchair)? [] 1   [x] 2   [] 3   [] 4   5. Need to walk in hospital room? [] 1   [x] 2   [] 3   [] 4   6. Climbing 3-5 steps with a railing? [] 1   [x] 2   [] 3   [] 4   © , Trustees of [address] [place] [address], under license to [employer]. All rights reserved      Score:  Initial: 13 Most Recent: X (Date: -- )    Interpretation of Tool:  Represents activities that are increasingly more difficult (i.e. Bed mobility, Transfers, Gait). Medical Necessity:     · Skilled intervention continues to be required due to poor functional mobility.   Reason for Services/Other Comments:  ·    Use of outcome tool(s) and clinical judgement create a POC that gives a: Clear prediction of patient's progress: LOW COMPLEXITY            TREATMENT:   (In addition to Assessment/Re-Assessment sessions the following treatments were rendered)   Pre-treatment Symptoms/Complaints:  No specific pain reported at this time. Pain: Initial:   Pain Intensity 1: 0  Post Session:  0     Therapeutic Activity: (    40 Minutes): Therapeutic activities including bed mobility training, transfer training, ambulation on level ground, static/dynamic sitting/standing balance, scooting, posture training, instruction in sequencing with rolling walker and patient education to improve mobility, strength, balance, and coordination. Required moderate Manual cues; Safety awareness training; Tactile cues; Verbal cues; Visual/Demos to promote static and dynamic balance in standing and promote coordination of bilateral, lower extremity(s). Braces/Orthotics/Lines/Etc:   · IV  · leyva catheter  · O2 Device: Room air  Treatment/Session Assessment:    · Response to Treatment:  See above  · Interdisciplinary Collaboration:   o Physical Therapist  o Registered Nurse  · After treatment position/precautions:   o Supine in bed  o Bed alarm/tab alert on  o Bed/Chair-wheels locked  o Bed in low position  o Call light within reach  o RN notified   · Compliance with Program/Exercises: Compliant most of the time\  · Recommendations/Intent for next treatment session: \"Next visit will focus on advancements to more challenging activities and decreased skin risk. \"    Total Treatment Duration:  PT Patient Time In/Time Out  Time In: 0309  Time Out: [address] [place], [place]

## 2020-07-27 NOTE — PROGRESS NOTES
Comprehensive Nutrition Assessment    Type and Reason for Visit: Reassess, TPN Management (Hospitalist)  This assessment was completed remotely. Nutrition Recommendations/Plan:   TPN: Continue current macronutrients.   Lytes/L of TPN:   Omit Na Phos, Potassium 30 meq (10 meq KAcetate, 20 meq KPO4), 5 meq Mg,no calcium  Other additives: MTE, MVI.   Vitamin and Mineral Supplement Therapy:  Nutrition support orders for electrolyte management replacement are activated and placed on the MAR.   Labs:   BMP daily, Phos and Mg MWF  Consider add SSI if POC glucose are > 180 mg/dl    Nutrition Assessment:   Nutrition History and Allergies: Pt reports hx of fall ~10 days ago with minimal mobility and oral intake since that time. He reports his family has been providing foods and beverages at best during this time he was able to consume [place] half a handful of food. \"  He c/o early satiety and abdominal fullness. He reports prior to this acute decline his overall intake had been declining for ~ 6 months. He has experienced wt loss of 4% over the past 2 months and a total of 8% over the past year based on current bed weight. This weight is likely falsely elevated secondary to edema. PMH remarkable for COPD, PVD, gout, CKD, and ETOH abuse. Admitted w/ severe sepsis . 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 PPN started 7/22 and changed to CPN on 7/23. Plan for diverting colostomy on 7/27. Also likely will need  addtional debridement. Central line:  Double lumen PICC 7/22. Remains TPN dependent for sacral/anal wound healing until able to consume >60% of needs from po diet. Diverting colostomy planned for 7/27 was cancelled as pt was transferred to ICU 7/26 PM d/t fever, hypotension and lethargy. COVID test pending. Transferred out of ICU 7/27  Malnutrition Assessment:  Malnutrition Status:  Severe malnutrition    Context:  Acute illness Findings of the clinical characteristics of malnutrition:   Energy Intake:  7 - 50% or less of est energy requirements for 5 or more days  Body Fat Loss:  7 - Moderate body fat loss, Triceps   Muscle Mass Loss:  7 - Moderate muscle mass loss, Calf, Clavicles (pectoralis & deltoids), Hand (interosseous), Scapula (trapezius), Temples (temporalis)    Estimated Daily Nutrient Needs:  Energy (kcal):  5069-5020(30-35kcal/kg IBW 70kg secondary edema, hx CKD, wound  Protein (g):  105-150(1.5-2 g/kg IBW d/t wnd)-GFR now >60       Fluid (ml/day):  1ml/kcal/day  Nutrition Related Findings:       Edema: 1+ generalized, BUE and BLE. Lab Results   Component Value Date/Time     (H) 07/27/2020 04:01 AM    K 3.8 07/27/2020 04:01 AM     (H) 07/27/2020 04:01 AM    CO2 24 07/27/2020 04:01 AM    AGAP 8 07/27/2020 04:01 AM     (H) 07/27/2020 04:01 AM    BUN 26 (H) 07/27/2020 04:01 AM    CREA 0.85 07/27/2020 04:01 AM    GFRAA >60 07/27/2020 04:01 AM    GFRNA >60 07/27/2020 04:01 AM    Corrected calcium 11.1-current TPN contains no calcium. Pt does have active order for TUMS  K omitted from TPN last night. Would benefit from addition of K back to TPN  Na decreased from 30 meq/L to 20 meq/L in TPN last night. Pt continues to receive significant Na from Zosyn  Mg 2.5-current TPN provides ~16 meq Mg/d. May benefit from decreased Mg in TPN. 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)       Current Nutrition Therapies:   DIET NUTRITIONAL SUPPLEMENTS All Meals;  [employer] ( )  DIET NUTRITIONAL SUPPLEMENTS Lunch, Dinner; [employer] ( )  DIET REGULAR  Current Parenteral Nutrition Orders:  · Type and Formula: 15%DEX/ 5%AA   · Lipids: 250ml, Daily  · Duration: Continuous  · Rate/Volume: 85 ml/hr or 2L/d  · Current PN Order Provides: 1920 kcal/d (91% of EERs), 100 grams of protein/d (95% of EPR), 300 grams of CHO/d, ~2300 ml of total volume/d    Anthropometric Measures:  · Height:  5' 8\" (172.7 cm)  Current Body Wt:   Last 3 Recorded Weights in this Encounter    07/21/20 1526 07/23/20 1622 07/27/20 0753   Weight: 81.6 kg (180 lb) 81.6 kg (180 lb) 82.9 kg (182 lb 11.2 oz)   · Last weight source: not specified  · Admission Body Wt:  180 lb(stated wt)    · Usual Body Wt:  195 lb     · Body mass index is 27.78 kg/m². · BMI Categories:  Overweight (BMI 25.0-29. 9)       Nutrition Diagnosis:   · Severe malnutrition related to catabolic illness, inadequate protein-energy intake as evidenced by (malnutrition criteria as identified above)    · Inadequate oral intake related to (sacral and anal wound with plan for diverting colostomy) as evidenced by nutrition support-parenteral nutrition(until diverting colostomy and improve po intake post-op)      Nutrition Interventions:   Food and/or Nutrient Delivery: Modify parenteral nutrition, Continue oral nutrition supplement  Coordination of Nutrition Care:  Goals:  Continue to meet >75% of needs       Nutrition Monitoring and Evaluation:   Behavioral-Environmental Outcomes:    Food/Nutrient Intake Outcomes: Food and nutrient intake, Supplement intake, Parenteral nutrition intake/tolerance  Physical Signs/Symptoms Outcomes: Skin(wound healing)    Discharge Planning:     Too soon to determine     Electronically signed by Natalya Sandra RD on 7/27/2020 at 11:43 AM    Contact: Shahana Ferguson, [address], [facility] [address] [place] [address]

## 2020-07-27 NOTE — PROGRESS NOTES
TRANSFER - IN REPORT:    Verbal report received from RN on Sissy Ocampo  being received from 5th floor room [id number] for routine progression of care      Report consisted of patients Situation, Background, Assessment and   Recommendations(SBAR). Information from the following report(s) SBAR was reviewed with the receiving nurse. Opportunity for questions and clarification was provided. Assessment completed upon patients arrival to unit and care assumed.

## 2020-07-27 NOTE — PROGRESS NOTES
Hospitalist Progress Note     Admit Date:  2020  3:20 PM   Name:  Thaddeus Vieyra   Age:  81 y.o.  :  1935   MRN:  [id number]   PCP:  Day Ward MD  Treatment Team: Attending Provider: Radha Mejia MD; Consulting Provider: Daniel Jordan MD; Utilization Review: Brooke Blizzard; Care Manager: Guerline Zhu; Primary Nurse: Bonita Chavarria; Primary Nurse: Justus Manzanares; Physical Therapist: Kevin Johnson PT; Primary Nurse: June Mack    Subjective:   Patient is an 82yo M with a history of COPD, PAD, tophaceous gout, and alcohol abuse who presented to ER for sacral sore worsening for two weeks. 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 Surgery consulted who performed debridement on . Confirmed presence of fistula and osteomyelitis. Wound care consulted. PICC in place for TPN, nutrition following. Interval History (): patient examined at bedside. Weaned off pressors. More alert at bedside this morning. Says \"do whatever you got to do to keep me alive! \" Denies any current complaints when asked.      They are aware objective:     Patient Vitals for the past 24 hrs:   Temp Pulse Resp BP SpO2   20 1136     98 %   20 1116  99 24 131/79 97 %   20 1101  (!) 103 24 132/73 99 %   20 1046  (!) 103 30 112/74 98 %   20 1032  (!) 103 22 145/80 98 %   20 1019  (!) 103 (!) 31 123/56 94 %   20 1001  (!) 104 19 140/90 97 %   20 0946  (!) 105 29 150/86 94 %   20 0931  (!) 103 (!) 40 145/80 97 %   20 0916  (!) 103 22 148/81 96 %   20 0901  100 (!) 33 152/85 97 %   20 0846  98 22 142/83 99 %   20 0832  99 25 144/83 93 %   20 0816  (!) 104 23 141/88 96 %   20 0801  98 (!) 31 137/86 99 %   20 0753     96 %   20 0746  (!) 101 (!) 38 138/80 94 %   20 0732  100 (!) 33 141/81 95 %   20 0716  (!) 103 (!) 55 141/82 95 %   07/27/20 0701 96.9 °F (36.1 °C) (!) 101 27 150/77 97 %   07/27/20 0646  (!) 101 23 142/82 97 %   07/27/20 0631  100 25 134/77 96 %   07/27/20 0616  (!) 103 25 137/75 97 %   07/27/20 0601  (!) 101 24 133/78 97 %   07/27/20 0546  (!) 101 (!) 34 144/74 98 %   07/27/20 0531  100 (!) 37 133/76 98 %   07/27/20 0516  (!) 103 29 143/89 99 %   07/27/20 0501  100 30 137/81 98 %   07/27/20 0446  (!) 101 16 139/73 99 %   07/27/20 0431  (!) 103 22 145/83 99 %   07/27/20 0416  (!) 104 29 135/87 98 %   07/27/20 0401  (!) 105 26 129/84 96 %   07/27/20 0345  (!) 103 (!) 36 137/81 97 %   07/27/20 0331 98.7 °F (37.1 °C) 96 24 128/70 99 %   07/27/20 0325     96 %   07/27/20 0319     97 %   07/27/20 0316  93 (!) 46 133/71 98 %   07/27/20 0301  92 28 135/76 97 %   07/27/20 0246  91 20 131/73 96 %   07/27/20 0231  (!) 101 (!) 35 134/80 98 %   07/27/20 0216  93 30 125/71 98 %   07/27/20 0201  93 26 123/74 96 %   07/27/20 0145  93 22 117/69 97 %   07/27/20 0131  93 28 113/66 97 %   07/27/20 0116  93 18 120/69 97 %   07/27/20 0101  96 23 119/70 96 %   07/27/20 0045  97 27 116/69 97 %   07/27/20 0031  95 22 128/70 97 %   07/27/20 0020     98 %   07/27/20 0016  94 23 124/71 97 %   07/26/20 2345 98.8 °F (37.1 °C) 98 16 119/71 97 %   07/26/20 2331  (!) 101 28 145/79 95 %   07/26/20 2316  (!) 102 28 123/63 96 %   07/26/20 2300  (!) 106 (!) 32 127/65 96 %   07/26/20 2245  (!) 105 22 114/54 96 %   07/26/20 2231  (!) 105 (!) 33 116/57 96 %   07/26/20 2216  (!) 105 28 113/58 95 %   07/26/20 2200  (!) 106 27 121/55 95 %   07/26/20 2146  (!) 103 24 111/49 96 %   07/26/20 2131  (!) 102 26 108/49 96 %   07/26/20 2116  (!) 101 26 112/47 97 %   07/26/20 2109  98 22 102/52 96 %   07/26/20 2100  97 23 102/51 97 %   07/26/20 2046  99 21 115/53 97 %   07/26/20 2031  96 27 115/60 96 %   07/26/20 2000  96 19 102/50 100 %   07/26/20 1954  (!) 104 27 (!) 57/33 96 %   07/26/20 1940     97 %   07/26/20 1934     100 %   07/26/20 1931 (!) 100.6 °F (38.1 °C) 99 24 92/42 99 %   07/26/20 1916  100 20 93/48 100 %   07/26/20 1914  99 18 108/57 100 %   07/26/20 1901  (!) 101 24 108/57 100 %   07/26/20 1846  (!) 102 21 111/47 100 %   07/26/20 1837  (!) 102 21 116/54 100 %   07/26/20 1830  (!) 111 20 (!) 64/40 100 %   07/26/20 1824  (!) 112 29 (!) 68/40 100 %   07/26/20 1816  (!) 114 26 (!) 81/51 100 %   07/26/20 1811     100 %   07/26/20 1801 100 °F (37.8 °C) (!) 118 (!) 32 93/61 100 %   07/26/20 1539 98.1 °F (36.7 °C)       07/26/20 1538 100 °F (37.8 °C) (!) 126 (!) 32 130/63 95 %   07/26/20 1250 (!) 100.9 °F (38.3 °C) (!) 123  122/70    07/26/20 1249 98.1 °F (36.7 °C)       07/26/20 1214 97.3 °F (36.3 °C)       07/26/20 1213 (!) 102.1 °F (38.9 °C) (!) 129 (!) 33 129/73 97 %     Oxygen Therapy  O2 Sat (%): 98 % (07/27/20 1136)  Pulse via Oximetry: 99 beats per minute (07/27/20 1136)  O2 Device: Nasal cannula (07/27/20 1136)  O2 Flow Rate (L/min): 2 l/min (07/27/20 1136)  FIO2 (%): 21 % (07/27/20 0319)    Intake/Output Summary (Last 24 hours) at 7/27/2020 1205  Last data filed at 7/27/2020 1043  Gross per 24 hour   Intake 1811.28 ml   Output 3700 ml   Net -1888.72 ml         REVIEW OF SYSTEMS: Comprehensive ROS performed and negative except as stated in HPI. Physical Examination:  General:    Mildly confused but will answer questions appropriately, no apparent distress  Head:  NCAT  CV:   RRR  Lungs:   CTA b/l with no audible wheezing, on 2L NC  Abdomen:   +BS, soft, NTND  Skin:     No petechia or purpura. Dressing on sacral ulcer with no visible bleeding  Neuro:  Alert, no gross focal deficits    Data Review:  I have reviewed all labs, meds, telemetry events, and studies from the last 24 hours.     Recent Results (from the past 24 hour(s))   GLUCOSE, POC    Collection Time: 07/26/20  1:39 PM   Result Value Ref Range    Glucose (POC) 119 (H) 65 - 122 mg/dL   METABOLIC PANEL, COMPREHENSIVE Collection Time: 07/26/20  1:45 PM   Result Value Ref Range    Sodium 120 (LL) 136 - 145 mmol/L    Potassium >10.0 (HH) 3.5 - 5.1 mmol/L    Chloride 92 (L) 98 - 107 mmol/L    CO2 18 (L) 21 - 32 mmol/L    Anion gap 10 7 - 16 mmol/L    Glucose 2,071 (HH) 65 - 100 mg/dL    BUN 21 8 - 23 MG/DL    Creatinine 1.44 0.8 - 1.5 MG/DL    GFR est AA >60 >60 ml/min/1.73m2    GFR est non-AA 50 (L) >60 ml/min/1.73m2    Calcium 7.2 (L) 8.3 - 10.4 MG/DL    Bilirubin, total 0.1 (L) 0.2 - 1.1 MG/DL    ALT (SGPT) 54 12 - 65 U/L    AST (SGOT) 65 (H) 15 - 37 U/L    Alk.  phosphatase 144 (H) 50 - 136 U/L    Protein, total 5.9 (L) 6.3 - 8.2 g/dL    Albumin 1.0 (L) 3.2 - 4.6 g/dL    Globulin 4.9 (H) 2.3 - 3.5 g/dL    A-G Ratio 0.2 (L) 1.2 - 3.5     LACTIC ACID    Collection Time: 07/26/20  1:45 PM   Result Value Ref Range    Lactic acid 0.5 0.4 - 2.0 MMOL/L   POC G3    Collection Time: 07/26/20  2:29 PM   Result Value Ref Range    Device: NASAL CANNULA      FIO2 (POC) 32 %    pH (POC) 7.25 (L) 7.35 - 7.45      pCO2 (POC) 50.8 (H) 35 - 45 MMHG    pO2 (POC) 66 (L) 75 - 100 MMHG    HCO3 (POC) 22.4 22 - 26 MMOL/L    sO2 (POC) 89 (L) 95 - 98 %    Base deficit (POC) 5 mmol/L    Allens test (POC) YES      Site RIGHT RADIAL      Specimen type (POC) ARTERIAL      Performed by FixMelindaRT     CO2, POC 24 MMOL/L    Flow rate (POC) 3.000 L/min    Respiratory comment: PhysicianNotified     COLLECT TIME 1,427     CULTURE, BLOOD    Collection Time: 07/26/20  2:30 PM    Specimen: Blood   Result Value Ref Range    Special Requests: RIGHT  ARM        Culture result: NO GROWTH AFTER 17 HOURS     CULTURE, BLOOD    Collection Time: 07/26/20  2:30 PM    Specimen: Blood   Result Value Ref Range    Special Requests: RIGHT  FOREARM        Culture result: NO GROWTH AFTER 17 HOURS     MAGNESIUM    Collection Time: 07/26/20  2:30 PM   Result Value Ref Range    Magnesium 2.4 1.8 - 2.4 mg/dL   PHOSPHORUS    Collection Time: 07/26/20  2:30 PM   Result Value Ref Range Phosphorus 3.6 2.3 - 3.7 MG/DL   METABOLIC PANEL, COMPREHENSIVE    Collection Time: 07/26/20  2:30 PM   Result Value Ref Range    Sodium 143 136 - 145 mmol/L    Potassium 5.5 (H) 3.5 - 5.1 mmol/L    Chloride 117 (H) 98 - 107 mmol/L    CO2 23 21 - 32 mmol/L    Anion gap 3 (L) 7 - 16 mmol/L    Glucose 119 (H) 65 - 100 mg/dL    BUN 26 (H) 8 - 23 MG/DL    Creatinine 0.86 0.8 - 1.5 MG/DL    GFR est AA >60 >60 ml/min/1.73m2    GFR est non-AA >60 >60 ml/min/1.73m2    Calcium 8.6 8.3 - 10.4 MG/DL    Bilirubin, total 0.2 0.2 - 1.1 MG/DL    ALT (SGPT) 76 (H) 12 - 65 U/L    AST (SGOT) 82 (H) 15 - 37 U/L    Alk. phosphatase 207 (H) 50 - 136 U/L    Protein, total 6.2 (L) 6.3 - 8.2 g/dL    Albumin 1.3 (L) 3.2 - 4.6 g/dL    Globulin 4.9 (H) 2.3 - 3.5 g/dL    A-G Ratio 0.3 (L) 1.2 - 3.5     CBC WITH AUTOMATED DIFF    Collection Time: 07/26/20  2:30 PM   Result Value Ref Range    WBC 13.4 (H) 4.3 - 11.1 K/uL    RBC 3.24 (L) 4.23 - 5.6 M/uL    HGB 10.3 (L) 13.6 - 17.2 g/dL    HCT 32.7 (L) 41.1 - 50.3 %    .9 (H) 79.6 - 97.8 FL    MCH 31.8 26.1 - 32.9 PG    MCHC 31.5 31.4 - 35.0 g/dL    RDW 14.8 (H) 11.9 - 14.6 %    PLATELET 276 603 - 379 K/uL    MPV 8.8 (L) 9.4 - 12.3 FL    ABSOLUTE NRBC 0.00 0.0 - 0.2 K/uL    DF AUTOMATED      NEUTROPHILS 63 43 - 78 %    LYMPHOCYTES 18 13 - 44 %    MONOCYTES 10 4.0 - 12.0 %    EOSINOPHILS 2 0.5 - 7.8 %    BASOPHILS 0 0.0 - 2.0 %    IMMATURE GRANULOCYTES 7 (H) 0.0 - 5.0 %    ABS. NEUTROPHILS 8.5 (H) 1.7 - 8.2 K/UL    ABS. LYMPHOCYTES 2.4 0.5 - 4.6 K/UL    ABS. MONOCYTES 1.4 (H) 0.1 - 1.3 K/UL    ABS. EOSINOPHILS 0.3 0.0 - 0.8 K/UL    ABS. BASOPHILS 0.0 0.0 - 0.2 K/UL    ABS. IMM.  GRANS. 0.9 (H) 0.0 - 0.5 K/UL   LACTIC ACID    Collection Time: 07/26/20  5:26 PM   Result Value Ref Range    Lactic acid 1.0 0.4 - 2.0 MMOL/L   SARS-COV-2    Collection Time: 07/26/20  6:04 PM   Result Value Ref Range    Specimen source Nasopharyngeal      COVID-19 rapid test Not detected NOTD      SARS CoV-2 PENDING POC G3    Collection Time: 07/26/20  7:43 PM   Result Value Ref Range    Device: BIPAP      FIO2 (POC) 40 %    pH (POC) 7.34 (L) 7.35 - 7.45      pCO2 (POC) 40.3 35 - 45 MMHG    pO2 (POC) 152 (H) 75 - 100 MMHG    HCO3 (POC) 21.6 (L) 22 - 26 MMOL/L    sO2 (POC) 99 (H) 95 - 98 %    Base deficit (POC) 4 mmol/L    Tidal volume 745 ml    Set Rate 8 bpm    PEEP/CPAP (POC) 10 cmH2O    PIP (POC) 19      Allens test (POC) YES      Inspiratory Time 0.9 sec    Site RIGHT RADIAL      Specimen type (POC) ARTERIAL      Performed by StoddardBrandonRT     CO2, POC 23 MMOL/L    Respiratory comment: NurseNotified     Exhaled minute volume 15.20 L/min    COLLECT TIME 1,940     AMMONIA    Collection Time: 07/26/20  7:49 PM   Result Value Ref Range    Ammonia 20 11 - 32 UMOL/L   POC G3    Collection Time: 07/27/20  3:20 AM   Result Value Ref Range    Device: BIPAP      FIO2 (POC) 21 %    pH (POC) 7.47 (H) 7.35 - 7.45      pCO2 (POC) 30.4 (L) 35 - 45 MMHG    pO2 (POC) 77 75 - 100 MMHG    HCO3 (POC) 21.9 (L) 22 - 26 MMOL/L    sO2 (POC) 96 95 - 98 %    Base deficit (POC) 1 mmol/L    Tidal volume 734 ml    Set Rate 8 bpm    PEEP/CPAP (POC) 10 cmH2O    PIP (POC) 19      Allens test (POC) YES      Inspiratory Time 0.9 sec    Site RIGHT RADIAL      Specimen type (POC) ARTERIAL      Performed by StoddardBrandonRT     CO2, POC 23 MMOL/L    Respiratory comment: NurseNotified     Exhaled minute volume 14.60 L/min    COLLECT TIME 599     METABOLIC PANEL, COMPREHENSIVE    Collection Time: 07/27/20  4:01 AM   Result Value Ref Range    Sodium 146 (H) 136 - 145 mmol/L    Potassium 3.8 3.5 - 5.1 mmol/L    Chloride 114 (H) 98 - 107 mmol/L    CO2 24 21 - 32 mmol/L    Anion gap 8 7 - 16 mmol/L    Glucose 151 (H) 65 - 100 mg/dL    BUN 26 (H) 8 - 23 MG/DL    Creatinine 0.85 0.8 - 1.5 MG/DL    GFR est AA >60 >60 ml/min/1.73m2    GFR est non-AA >60 >60 ml/min/1.73m2    Calcium 9.5 8.3 - 10.4 MG/DL    Bilirubin, total 0.2 0.2 - 1.1 MG/DL    ALT (SGPT) 64 12 - 65 U/L    AST (SGOT) 73 (H) 15 - 37 U/L    Alk.  phosphatase 165 (H) 50 - 136 U/L    Protein, total 7.1 6.3 - 8.2 g/dL    Albumin 2.0 (L) 3.2 - 4.6 g/dL    Globulin 5.1 (H) 2.3 - 3.5 g/dL    A-G Ratio 0.4 (L) 1.2 - 3.5     PHOSPHORUS    Collection Time: 07/27/20  4:01 AM   Result Value Ref Range    Phosphorus 3.1 2.3 - 3.7 MG/DL   MAGNESIUM    Collection Time: 07/27/20  4:01 AM   Result Value Ref Range    Magnesium 2.5 (H) 1.8 - 2.4 mg/dL   CBC W/O DIFF    Collection Time: 07/27/20  4:01 AM   Result Value Ref Range    WBC 13.6 (H) 4.3 - 11.1 K/uL    RBC 3.00 (L) 4.23 - 5.6 M/uL    HGB 9.4 (L) 13.6 - 17.2 g/dL    HCT 30.0 (L) 41.1 - 50.3 %    .0 (H) 79.6 - 97.8 FL    MCH 31.3 26.1 - 32.9 PG    MCHC 31.3 (L) 31.4 - 35.0 g/dL    RDW 14.7 (H) 11.9 - 14.6 %    PLATELET 222 298 - 173 K/uL    MPV 8.9 (L) 9.4 - 12.3 FL    ABSOLUTE NRBC 0.00 0.0 - 0.2 K/uL   GLUCOSE, POC    Collection Time: 07/27/20  8:26 AM   Result Value Ref Range    Glucose (POC) 127 (H) 65 - 100 mg/dL        All Micro Results     Procedure Component Value Units Date/Time    CULTURE, BLOOD [[id number]] Collected:  07/26/20 1430    Order Status:  Completed Specimen:  Blood Updated:  07/27/20 0959     Special Requests: --        RIGHT  ARM       Culture result: NO GROWTH AFTER 17 HOURS       CULTURE, BLOOD [[id number]] Collected:  07/26/20 1430    Order Status:  Completed Specimen:  Blood Updated:  07/27/20 0959     Special Requests: --        RIGHT  FOREARM       Culture result: NO GROWTH AFTER 17 HOURS       CULTURE, BLOOD [[id number]] Collected:  07/21/20 2015    Order Status:  Completed Specimen:  Blood Updated:  07/26/20 0644     Special Requests: --        LEFT  Antecubital       Culture result: NO GROWTH 5 DAYS       CULTURE, URINE [[id number]] Collected:  07/21/20 1815    Order Status:  Canceled Specimen:  Cath Urine           Current Meds:  Current Facility-Administered Medications   Medication Dose Route Frequency    albuterol (PROVENTIL HFA, VENTOLIN HFA, PROAIR HFA) inhaler 2 Puff  2 Puff Inhalation QID RT    calcium carbonate (TUMS) chewable tablet 200 mg [elemental]  200 mg Oral TID WITH MEALS    Vancomycin Trough Reminder   Other ONCE    TPN ADULT-CENTRAL - dextrose 15% amino acid 5%   IntraVENous QPM    LORazepam (ATIVAN) injection 1 mg  1 mg IntraVENous Q6H PRN    TPN ADULT-CENTRAL - dextrose 15% amino acid 5%   IntraVENous QPM    NOREPINephrine (LEVOPHED) 4 mg in 5% dextrose 250 mL infusion  0.5-30 mcg/min IntraVENous TITRATE    vancomycin (VANCOCIN) 1,000 mg in 0.9% sodium chloride (MBP/ADV) 250 mL  1,000 mg IntraVENous Q18H    ondansetron (ZOFRAN) injection 4 mg  4 mg IntraVENous Z4U PRN    folic acid (FOLVITE) tablet 1 mg  1 mg Oral DAILY    thiamine HCL (B-1) tablet 100 mg  100 mg Oral DAILY    fat emulsion 20% (LIPOSYN, INTRAlipid) infusion 250 mL  250 mL IntraVENous QPM    sodium hypochlorite (QUARTER STRENGTH DAKIN'S) 0.125% irrigation (bottle)   Topical BID    NUTRITIONAL SUPPORT ELECTROLYTE PRN ORDERS   Does Not Apply PRN    central line flush (saline) syringe 20 mL  20 mL InterCATHeter Q8H    sodium chloride (NS) flush 5-40 mL  5-40 mL IntraVENous Q8H    sodium chloride (NS) flush 5-40 mL  5-40 mL IntraVENous PRN    enoxaparin (LOVENOX) injection 40 mg  40 mg SubCUTAneous Q24H    [Held by provider] clopidogreL (PLAVIX) tablet 75 mg  75 mg Oral DAILY    albuterol (PROVENTIL VENTOLIN) nebulizer solution 2.5 mg  2.5 mg Nebulization Q4H PRN    dilTIAZem ER (CARDIZEM CD) capsule 120 mg  120 mg Oral DAILY    probenecid-colchicine (ColBenemid) tablet 1 Tab (Patient Supplied)  1 Tab Oral DAILY    piperacillin-tazobactam (ZOSYN) 3.375 g in 0.9% sodium chloride (MBP/ADV) 100 mL  3.375 g IntraVENous Q8H    LORazepam (ATIVAN) tablet 1 mg  1 mg Oral Q6H PRN    morphine injection 2 mg  2 mg IntraVENous Q4H PRN    acetaminophen (TYLENOL) tablet 650 mg  650 mg Oral Q4H PRN       Diet:  DIET NUTRITIONAL SUPPLEMENTS  DIET NUTRITIONAL SUPPLEMENTS  DIET REGULAR    Other Studies (last 24 hours):  Xr Chest Sngl V    Result Date: 7/26/2020  Chest x-ray and KUB 7/26/2020 CLINICAL HISTORY: Dyspnea, abdominal guarding, and lethargy. COMPARISON: Chest x-ray 7/22/2020, and CT abdomen and pelvis with contrast 7/22/2020 FINDINGS: Chest x-ray: A right upper trauma the PICC line is now seen. The catheter tip overlies the superior cavoatrial junction grossly good position. Stable mild to moderate cardiomegaly is seen. No appreciable pneumothorax is seen. However, there are evolving relatively central infiltrates of the bilateral lungs and evolving small to moderate bilateral pleural effusions. The appearance is most suggestive of evolving heart failure. KUB: 2 flat views are submitted. They show nonspecific bowel gas pattern without abnormal bowel dilation seen. Clearly visualized colonic gas is seen with some colonic gas seen down to the rectal vault. IMPRESSION: 1. Evolving changes of the chest as described above most suggestive of heart failure. 2. Nonspecific bowel gas pattern. Xr Abd (kub)    Result Date: 7/26/2020  Chest x-ray and KUB 7/26/2020 CLINICAL HISTORY: Dyspnea, abdominal guarding, and lethargy. COMPARISON: Chest x-ray 7/22/2020, and CT abdomen and pelvis with contrast 7/22/2020 FINDINGS: Chest x-ray: A right upper trauma the PICC line is now seen. The catheter tip overlies the superior cavoatrial junction grossly good position. Stable mild to moderate cardiomegaly is seen. No appreciable pneumothorax is seen. However, there are evolving relatively central infiltrates of the bilateral lungs and evolving small to moderate bilateral pleural effusions. The appearance is most suggestive of evolving heart failure. KUB: 2 flat views are submitted. They show nonspecific bowel gas pattern without abnormal bowel dilation seen. Clearly visualized colonic gas is seen with some colonic gas seen down to the rectal vault.      IMPRESSION: 1. Evolving changes of the chest as described above most suggestive of heart failure. 2. Nonspecific bowel gas pattern.        Assessment and Plan:     Hospital Problems as of 7/27/2020 Date Reviewed: 7/20/2020          Codes Class Noted - Resolved POA    Shock, unspecified ([facility] ICD-10-CM: R57.9  ICD-9-CM: 785.50  7/27/2020 - Present Unknown        Metabolic encephalopathy UUI-17-XY: G93.41  ICD-9-CM: 348.31  7/27/2020 - Present Unknown        Hyperchloremic acidosis ICD-10-CM: E87.2  ICD-9-CM: 276.2  7/26/2020 - Present Unknown        Hypoproteinemia ([facility] ICD-10-CM: E77.8  ICD-9-CM: 273.8  7/26/2020 - Present Unknown        Hyperkalemia ICD-10-CM: E87.5  ICD-9-CM: 276.7  7/26/2020 - Present Unknown        Hypomagnesemia ICD-10-CM: E83.42  ICD-9-CM: 275.2  7/22/2020 - Present Unknown        * (Principal) Severe sepsis ([facility] ICD-10-CM: A41.9, R65.20  ICD-9-CM: 038.9, 995.92  7/21/2020 - Present Unknown        Pressure injury of deep tissue of sacral region ICD-10-CM: L89.156  ICD-9-CM: 707.03, 707.20  7/21/2020 - Present Unknown        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-CM: K60.4  ICD-9-CM: 565.1  7/21/2020 - Present Unknown        Hyponatremia ICD-10-CM: E87.1  ICD-9-CM: 276.1  7/21/2020 - Present Unknown        Hypokalemia ICD-10-CM: E87.6  ICD-9-CM: 276.8  7/21/2020 - Present Unknown        Acute kidney injury (MARY) with acute tubular necrosis (ATN) ([facility] ICD-10-CM: N17.0  ICD-9-CM: 584.5  7/21/2020 - Present Unknown        Acute on chronic respiratory failure with hypoxia and hypercapnia (HCC) ICD-10-CM: J96.21, J96.22  ICD-9-CM: 518.84, 786.09, 799.02  7/21/2020 - Present         Normocytic anemia ICD-10-CM: D64.9  ICD-9-CM: 285.9  7/21/2020 - Present Unknown        Severe protein-calorie malnutrition ([facility] (Chronic) ICD-10-CM: E43  ICD-9-CM: 262  7/21/2020 - Present Yes        Chronic obstructive pulmonary disease ([facility] ICD-10-CM: J44.9  ICD-9-CM: 727  6/22/2020 - Present Yes        Tophaceous gout ICD-10-CM: T1Y.8YJ3  ICD-9-CM: 274.03  5/18/2018 - Present Unknown        Atherosclerosis of native artery of extremity with intermittent claudication (HCC) (Chronic) ICD-10-CM: I70.219  ICD-9-CM: 440.21  5/13/2014 - Present Yes        Former cigarette smoker ICD-10-CM: C49.741  ICD-9-CM: V15.82  5/13/2014 - Present Yes        HTN (hypertension) (Chronic) ICD-10-CM: I10  ICD-9-CM: 401.9  5/13/2014 - Present Yes              A/P:        # Septic shock, patient with known sacral decubitus/rectocutaneous fistula   - CT imaging showing coccyx bone destruction/osteomyelitis with extension over to anal sphincter  - blood cultures collected x2 from 7/26/2020 with NGTD  - general surgery following with plans for diverting colostomy tomorrow  - continue with empiric vancomycin/Zosyn for now  - consult ID tomorrow for further recs regarding antibiotics  - swabbed for COVID-19 yesterday due to intermittent confusion, result pending    # Acute hypoxic respiratory failure, known history of COPD  - CXR showing evolving infiltrates/effusions on both lungs  - stop fluids  - last TTE showing preserved EF and indeterminate LV diastolic function  - jet nebs as needed  - COVID swab collected and pending  - wean supplemental oxygen as tolerated  - goal SpO2 is 88-92% with COPD  - pulmonary signed off    # Acute metabolic encephalopathy, likely due to sepsis/hypoxemia  - improved overall  - sepsis management  - avoid sedative/hypnotics   - nonpharmacologic interventions (blinds open, glasses/hearing aids nearby, etc)    # MARY, improved overall  - continue to monitor    # Pseudohyponatremia   - due to profound hyperglycemia  - corrected now    # ? Alcohol withdrawal   - Ativan per [facility] protocol   - thiamine/folate supplementation      # PVD  - hold Plavix    # Severe protein-calorie malnutrition  - stop TPN  - encourage po intake  - speech therapy consult for swallow assessment     F/E/N: no fluids, replete electrolytes as needed, regular diet    Ppx: Lovenox for VTE    Code Status: FULL CODE    Disposition: transfer from ICU to floor with plan as above. Following surgery patient will need PT/OT consults but will likely need SNF at discharge. Discussed with patient and family via phone.  All questions answered.       Decision Maker: Selfoldest child is Lety Daniels [phone]

## 2020-07-28 ENCOUNTER — APPOINTMENT (OUTPATIENT)
Dept: GENERAL RADIOLOGY | Age: 85
DRG: 853 | End: 2020-07-28
Attending: ANESTHESIOLOGY
Payer: MEDICARE

## 2020-07-28 LAB
ALBUMIN SERPL-MCNC: 1.5 G/DL (ref 3.2–4.6)
ALBUMIN/GLOB SERPL: 0.3 {RATIO} (ref 1.2–3.5)
ALP SERPL-CCNC: 153 U/L (ref 50–136)
ALT SERPL-CCNC: 92 U/L (ref 12–65)
ANION GAP SERPL CALC-SCNC: 6 MMOL/L (ref 7–16)
ARTERIAL PATENCY WRIST A: YES
AST SERPL-CCNC: 135 U/L (ref 15–37)
BASE DEFICIT BLD-SCNC: 4 MMOL/L
BDY SITE: ABNORMAL
BILIRUB SERPL-MCNC: 0.2 MG/DL (ref 0.2–1.1)
BUN SERPL-MCNC: 28 MG/DL (ref 8–23)
CALCIUM SERPL-MCNC: 8.9 MG/DL (ref 8.3–10.4)
CHLORIDE SERPL-SCNC: 113 MMOL/L (ref 98–107)
CO2 BLD-SCNC: 24 MMOL/L
CO2 SERPL-SCNC: 24 MMOL/L (ref 21–32)
COLLECT TIME,HTIME: 1505
COVID-19 RAPID TEST, COVR: NOT DETECTED
CREAT SERPL-MCNC: 0.72 MG/DL (ref 0.8–1.5)
EXHALED MINUTE VOLUME, VE: 9.1 L/MIN
GAS FLOW.O2 O2 DELIVERY SYS: ABNORMAL L/MIN
GAS FLOW.O2 SETTING OXYMISER: 20 BPM
GLOBULIN SER CALC-MCNC: 4.8 G/DL (ref 2.3–3.5)
GLUCOSE SERPL-MCNC: 91 MG/DL (ref 65–100)
HCO3 BLD-SCNC: 22.4 MMOL/L (ref 22–26)
INSPIRATION.DURATION SETTING TIME VENT: 0.9 SEC
O2/TOTAL GAS SETTING VFR VENT: 50 %
PCO2 BLD: 44.9 MMHG (ref 35–45)
PEEP RESPIRATORY: 8 CMH2O
PH BLD: 7.31 [PH] (ref 7.35–7.45)
PO2 BLD: 129 MMHG (ref 75–100)
POTASSIUM SERPL-SCNC: 3.7 MMOL/L (ref 3.5–5.1)
PROT SERPL-MCNC: 6.3 G/DL (ref 6.3–8.2)
SAO2 % BLD: 99 % (ref 95–98)
SARS COV-2, XPGCVT: NEGATIVE
SERVICE CMNT-IMP: ABNORMAL
SERVICE CMNT-IMP: ABNORMAL
SODIUM SERPL-SCNC: 143 MMOL/L (ref 136–145)
SOURCE, COVRS: NORMAL
SPECIMEN TYPE: ABNORMAL
VANCOMYCIN TROUGH SERPL-MCNC: 15.2 UG/ML (ref 5–20)
VENTILATION MODE VENT: ABNORMAL
VT SETTING VENT: 450 ML

## 2020-07-28 PROCEDURE — 0D5Q0ZZ DESTRUCTION OF ANUS, OPEN APPROACH: ICD-10-PCS | Performed by: SURGERY

## 2020-07-28 PROCEDURE — P9047 ALBUMIN (HUMAN), 25%, 50ML: HCPCS | Performed by: INTERNAL MEDICINE

## 2020-07-28 PROCEDURE — 77030005401 HC CATH RAD ARRO -A: Performed by: ANESTHESIOLOGY

## 2020-07-28 PROCEDURE — 74011250637 HC RX REV CODE- 250/637: Performed by: ANESTHESIOLOGY

## 2020-07-28 PROCEDURE — 77030034628 HC LIGASURE LAP SEAL DIV MD COVD -F: Performed by: SURGERY

## 2020-07-28 PROCEDURE — 77030018836 HC SOL IRR NACL ICUM -A: Performed by: SURGERY

## 2020-07-28 PROCEDURE — 82803 BLOOD GASES ANY COMBINATION: CPT

## 2020-07-28 PROCEDURE — 71045 X-RAY EXAM CHEST 1 VIEW: CPT

## 2020-07-28 PROCEDURE — 77030008522 HC TBNG INSUF LAPRO STRY -B: Performed by: SURGERY

## 2020-07-28 PROCEDURE — 74011250636 HC RX REV CODE- 250/636: Performed by: INTERNAL MEDICINE

## 2020-07-28 PROCEDURE — 77030003029 HC SUT VCRL J&J -B: Performed by: SURGERY

## 2020-07-28 PROCEDURE — 77030008518 HC TBNG INSUF ENDO STRY -B: Performed by: SURGERY

## 2020-07-28 PROCEDURE — 77030008756 HC TU IRR SUC STRY -B: Performed by: SURGERY

## 2020-07-28 PROCEDURE — 86923 COMPATIBILITY TEST ELECTRIC: CPT

## 2020-07-28 PROCEDURE — 77030000038 HC TIP SCIS LAPSCP SURI -B: Performed by: SURGERY

## 2020-07-28 PROCEDURE — 77030002996 HC SUT SLK J&J -A: Performed by: SURGERY

## 2020-07-28 PROCEDURE — 77030021678 HC GLIDESCP STAT DISP VERT -B: Performed by: ANESTHESIOLOGY

## 2020-07-28 PROCEDURE — 74011250636 HC RX REV CODE- 250/636: Performed by: NURSE PRACTITIONER

## 2020-07-28 PROCEDURE — 74011250636 HC RX REV CODE- 250/636: Performed by: NURSE ANESTHETIST, CERTIFIED REGISTERED

## 2020-07-28 PROCEDURE — 77030034850: Performed by: SURGERY

## 2020-07-28 PROCEDURE — 77030031139 HC SUT VCRL2 J&J -A: Performed by: SURGERY

## 2020-07-28 PROCEDURE — 74011000250 HC RX REV CODE- 250: Performed by: NURSE ANESTHETIST, CERTIFIED REGISTERED

## 2020-07-28 PROCEDURE — 86900 BLOOD TYPING SEROLOGIC ABO: CPT

## 2020-07-28 PROCEDURE — 77030027876 HC STPLR ENDOSC FLX PWR J&J -G1: Performed by: SURGERY

## 2020-07-28 PROCEDURE — 77030013794 HC KT TRNSDUC BLD EDWD -B: Performed by: ANESTHESIOLOGY

## 2020-07-28 PROCEDURE — 65610000006 HC RM INTENSIVE CARE

## 2020-07-28 PROCEDURE — 77030040922 HC BLNKT HYPOTHRM STRY -A: Performed by: ANESTHESIOLOGY

## 2020-07-28 PROCEDURE — 0DBN4ZZ EXCISION OF SIGMOID COLON, PERCUTANEOUS ENDOSCOPIC APPROACH: ICD-10-PCS | Performed by: SURGERY

## 2020-07-28 PROCEDURE — 94002 VENT MGMT INPAT INIT DAY: CPT

## 2020-07-28 PROCEDURE — 74011000250 HC RX REV CODE- 250: Performed by: ANESTHESIOLOGY

## 2020-07-28 PROCEDURE — 99291 CRITICAL CARE FIRST HOUR: CPT | Performed by: INTERNAL MEDICINE

## 2020-07-28 PROCEDURE — 77030002888 HC SUT CHRMC J&J -A: Performed by: SURGERY

## 2020-07-28 PROCEDURE — 88307 TISSUE EXAM BY PATHOLOGIST: CPT

## 2020-07-28 PROCEDURE — 74011250636 HC RX REV CODE- 250/636: Performed by: ANESTHESIOLOGY

## 2020-07-28 PROCEDURE — 0KBP0ZZ EXCISION OF LEFT HIP MUSCLE, OPEN APPROACH: ICD-10-PCS | Performed by: SURGERY

## 2020-07-28 PROCEDURE — 74011250636 HC RX REV CODE- 250/636: Performed by: SURGERY

## 2020-07-28 PROCEDURE — 77030040361 HC SLV COMPR DVT MDII -B: Performed by: SURGERY

## 2020-07-28 PROCEDURE — 77030010513 HC APPL CLP LIG J&J -C: Performed by: SURGERY

## 2020-07-28 PROCEDURE — 77030034849: Performed by: SURGERY

## 2020-07-28 PROCEDURE — 77030016151 HC PROTCTR LNS DFOG COVD -B: Performed by: SURGERY

## 2020-07-28 PROCEDURE — 77030034154 HC SHR COAG HARM ACE J&J -F: Performed by: SURGERY

## 2020-07-28 PROCEDURE — 80053 COMPREHEN METABOLIC PANEL: CPT

## 2020-07-28 PROCEDURE — 76210000016 HC OR PH I REC 1 TO 1.5 HR: Performed by: SURGERY

## 2020-07-28 PROCEDURE — 77030040506 HC DRN WND MDII -A: Performed by: SURGERY

## 2020-07-28 PROCEDURE — 74011000258 HC RX REV CODE- 258: Performed by: SURGERY

## 2020-07-28 PROCEDURE — 76010000172 HC OR TIME 2.5 TO 3 HR INTENSV-TIER 1: Performed by: SURGERY

## 2020-07-28 PROCEDURE — 74011250637 HC RX REV CODE- 250/637: Performed by: INTERNAL MEDICINE

## 2020-07-28 PROCEDURE — 77030013292 HC BOWL MX PRSM J&J -A: Performed by: ANESTHESIOLOGY

## 2020-07-28 PROCEDURE — 0D1N4Z4 BYPASS SIGMOID COLON TO CUTANEOUS, PERCUTANEOUS ENDOSCOPIC APPROACH: ICD-10-PCS | Performed by: SURGERY

## 2020-07-28 PROCEDURE — 76060000036 HC ANESTHESIA 2.5 TO 3 HR: Performed by: SURGERY

## 2020-07-28 PROCEDURE — 74011250636 HC RX REV CODE- 250/636: Performed by: FAMILY MEDICINE

## 2020-07-28 PROCEDURE — 77030019908 HC STETH ESOPH SIMS -A: Performed by: ANESTHESIOLOGY

## 2020-07-28 PROCEDURE — 74011000258 HC RX REV CODE- 258: Performed by: NURSE PRACTITIONER

## 2020-07-28 PROCEDURE — 36600 WITHDRAWAL OF ARTERIAL BLOOD: CPT

## 2020-07-28 PROCEDURE — 77030021158 HC TRCR BLN GELPRT AMR -B: Performed by: SURGERY

## 2020-07-28 PROCEDURE — 77030037088 HC TUBE ENDOTRACH ORAL NSL COVD-A: Performed by: ANESTHESIOLOGY

## 2020-07-28 RX ORDER — MIDAZOLAM HYDROCHLORIDE 1 MG/ML
2 INJECTION, SOLUTION INTRAMUSCULAR; INTRAVENOUS ONCE
Status: DISCONTINUED | OUTPATIENT
Start: 2020-07-28 | End: 2020-07-28 | Stop reason: HOSPADM

## 2020-07-28 RX ORDER — OXYCODONE HYDROCHLORIDE 5 MG/1
5 TABLET ORAL
Status: DISCONTINUED | OUTPATIENT
Start: 2020-07-28 | End: 2020-07-28 | Stop reason: HOSPADM

## 2020-07-28 RX ORDER — NEOSTIGMINE METHYLSULFATE 1 MG/ML
INJECTION, SOLUTION INTRAVENOUS AS NEEDED
Status: DISCONTINUED | OUTPATIENT
Start: 2020-07-28 | End: 2020-07-28 | Stop reason: HOSPADM

## 2020-07-28 RX ORDER — LIDOCAINE HYDROCHLORIDE 10 MG/ML
0.1 INJECTION INFILTRATION; PERINEURAL AS NEEDED
Status: DISCONTINUED | OUTPATIENT
Start: 2020-07-28 | End: 2020-07-28 | Stop reason: HOSPADM

## 2020-07-28 RX ORDER — SODIUM CHLORIDE, SODIUM LACTATE, POTASSIUM CHLORIDE, CALCIUM CHLORIDE 600; 310; 30; 20 MG/100ML; MG/100ML; MG/100ML; MG/100ML
INJECTION, SOLUTION INTRAVENOUS
Status: DISCONTINUED | OUTPATIENT
Start: 2020-07-28 | End: 2020-07-28 | Stop reason: HOSPADM

## 2020-07-28 RX ORDER — DIPHENHYDRAMINE HYDROCHLORIDE 50 MG/ML
12.5 INJECTION, SOLUTION INTRAMUSCULAR; INTRAVENOUS
Status: DISCONTINUED | OUTPATIENT
Start: 2020-07-28 | End: 2020-07-28 | Stop reason: HOSPADM

## 2020-07-28 RX ORDER — FENTANYL CITRATE 50 UG/ML
INJECTION, SOLUTION INTRAMUSCULAR; INTRAVENOUS AS NEEDED
Status: DISCONTINUED | OUTPATIENT
Start: 2020-07-28 | End: 2020-07-28 | Stop reason: HOSPADM

## 2020-07-28 RX ORDER — MIDAZOLAM HYDROCHLORIDE 1 MG/ML
2 INJECTION, SOLUTION INTRAMUSCULAR; INTRAVENOUS
Status: DISCONTINUED | OUTPATIENT
Start: 2020-07-28 | End: 2020-07-28 | Stop reason: HOSPADM

## 2020-07-28 RX ORDER — PROPOFOL 10 MG/ML
INJECTION, EMULSION INTRAVENOUS AS NEEDED
Status: DISCONTINUED | OUTPATIENT
Start: 2020-07-28 | End: 2020-07-28 | Stop reason: HOSPADM

## 2020-07-28 RX ORDER — ONDANSETRON 2 MG/ML
INJECTION INTRAMUSCULAR; INTRAVENOUS AS NEEDED
Status: DISCONTINUED | OUTPATIENT
Start: 2020-07-28 | End: 2020-07-28 | Stop reason: HOSPADM

## 2020-07-28 RX ORDER — SODIUM CHLORIDE 9 MG/ML
50 INJECTION, SOLUTION INTRAVENOUS CONTINUOUS
Status: DISCONTINUED | OUTPATIENT
Start: 2020-07-28 | End: 2020-07-28 | Stop reason: HOSPADM

## 2020-07-28 RX ORDER — ROCURONIUM BROMIDE 10 MG/ML
INJECTION, SOLUTION INTRAVENOUS AS NEEDED
Status: DISCONTINUED | OUTPATIENT
Start: 2020-07-28 | End: 2020-07-28 | Stop reason: HOSPADM

## 2020-07-28 RX ORDER — OXYCODONE AND ACETAMINOPHEN 5; 325 MG/1; MG/1
1 TABLET ORAL AS NEEDED
Status: DISCONTINUED | OUTPATIENT
Start: 2020-07-28 | End: 2020-07-28 | Stop reason: HOSPADM

## 2020-07-28 RX ORDER — SUCCINYLCHOLINE CHLORIDE 20 MG/ML
INJECTION INTRAMUSCULAR; INTRAVENOUS AS NEEDED
Status: DISCONTINUED | OUTPATIENT
Start: 2020-07-28 | End: 2020-07-28 | Stop reason: HOSPADM

## 2020-07-28 RX ORDER — SODIUM CHLORIDE 0.9 % (FLUSH) 0.9 %
5-40 SYRINGE (ML) INJECTION EVERY 8 HOURS
Status: DISCONTINUED | OUTPATIENT
Start: 2020-07-28 | End: 2020-07-28 | Stop reason: HOSPADM

## 2020-07-28 RX ORDER — HYDROMORPHONE HYDROCHLORIDE 2 MG/ML
0.5 INJECTION, SOLUTION INTRAMUSCULAR; INTRAVENOUS; SUBCUTANEOUS
Status: DISCONTINUED | OUTPATIENT
Start: 2020-07-28 | End: 2020-07-28 | Stop reason: HOSPADM

## 2020-07-28 RX ORDER — ALBUMIN HUMAN 250 G/1000ML
12.5 SOLUTION INTRAVENOUS EVERY 6 HOURS
Status: DISCONTINUED | OUTPATIENT
Start: 2020-07-28 | End: 2020-07-29 | Stop reason: HOSPADM

## 2020-07-28 RX ORDER — CEFAZOLIN SODIUM/WATER 2 G/20 ML
SYRINGE (ML) INTRAVENOUS AS NEEDED
Status: DISCONTINUED | OUTPATIENT
Start: 2020-07-28 | End: 2020-07-28

## 2020-07-28 RX ORDER — FENTANYL CITRATE 50 UG/ML
25 INJECTION, SOLUTION INTRAMUSCULAR; INTRAVENOUS ONCE
Status: DISCONTINUED | OUTPATIENT
Start: 2020-07-28 | End: 2020-07-28 | Stop reason: HOSPADM

## 2020-07-28 RX ORDER — SODIUM CHLORIDE 9 MG/ML
250 INJECTION, SOLUTION INTRAVENOUS AS NEEDED
Status: DISCONTINUED | OUTPATIENT
Start: 2020-07-28 | End: 2020-07-29 | Stop reason: HOSPADM

## 2020-07-28 RX ORDER — IPRATROPIUM BROMIDE AND ALBUTEROL SULFATE 2.5; .5 MG/3ML; MG/3ML
SOLUTION RESPIRATORY (INHALATION)
Status: ACTIVE
Start: 2020-07-28 | End: 2020-07-29

## 2020-07-28 RX ORDER — METRONIDAZOLE 500 MG/100ML
500 INJECTION, SOLUTION INTRAVENOUS EVERY 12 HOURS
Status: DISCONTINUED | OUTPATIENT
Start: 2020-07-28 | End: 2020-07-29 | Stop reason: HOSPADM

## 2020-07-28 RX ORDER — IPRATROPIUM BROMIDE AND ALBUTEROL SULFATE 2.5; .5 MG/3ML; MG/3ML
3 SOLUTION RESPIRATORY (INHALATION)
Status: COMPLETED | OUTPATIENT
Start: 2020-07-28 | End: 2020-07-28

## 2020-07-28 RX ORDER — LIDOCAINE HYDROCHLORIDE 20 MG/ML
INJECTION, SOLUTION EPIDURAL; INFILTRATION; INTRACAUDAL; PERINEURAL AS NEEDED
Status: DISCONTINUED | OUTPATIENT
Start: 2020-07-28 | End: 2020-07-28 | Stop reason: HOSPADM

## 2020-07-28 RX ORDER — SODIUM CHLORIDE 0.9 % (FLUSH) 0.9 %
5-40 SYRINGE (ML) INJECTION AS NEEDED
Status: DISCONTINUED | OUTPATIENT
Start: 2020-07-28 | End: 2020-07-28 | Stop reason: HOSPADM

## 2020-07-28 RX ORDER — FAMOTIDINE 20 MG/1
20 TABLET, FILM COATED ORAL ONCE
Status: COMPLETED | OUTPATIENT
Start: 2020-07-28 | End: 2020-07-28

## 2020-07-28 RX ORDER — SODIUM CHLORIDE, SODIUM LACTATE, POTASSIUM CHLORIDE, CALCIUM CHLORIDE 600; 310; 30; 20 MG/100ML; MG/100ML; MG/100ML; MG/100ML
75 INJECTION, SOLUTION INTRAVENOUS CONTINUOUS
Status: DISCONTINUED | OUTPATIENT
Start: 2020-07-28 | End: 2020-07-28 | Stop reason: HOSPADM

## 2020-07-28 RX ORDER — GLYCOPYRROLATE 0.2 MG/ML
INJECTION INTRAMUSCULAR; INTRAVENOUS AS NEEDED
Status: DISCONTINUED | OUTPATIENT
Start: 2020-07-28 | End: 2020-07-28 | Stop reason: HOSPADM

## 2020-07-28 RX ORDER — PROPOFOL 10 MG/ML
0-50 VIAL (ML) INTRAVENOUS
Status: DISCONTINUED | OUTPATIENT
Start: 2020-07-28 | End: 2020-07-29 | Stop reason: HOSPADM

## 2020-07-28 RX ORDER — SODIUM CHLORIDE, SODIUM LACTATE, POTASSIUM CHLORIDE, CALCIUM CHLORIDE 600; 310; 30; 20 MG/100ML; MG/100ML; MG/100ML; MG/100ML
100 INJECTION, SOLUTION INTRAVENOUS CONTINUOUS
Status: DISCONTINUED | OUTPATIENT
Start: 2020-07-28 | End: 2020-07-28 | Stop reason: HOSPADM

## 2020-07-28 RX ADMIN — FENTANYL CITRATE 25 MCG: 50 INJECTION INTRAMUSCULAR; INTRAVENOUS at 12:01

## 2020-07-28 RX ADMIN — PHENYLEPHRINE HYDROCHLORIDE 120 MCG: 10 INJECTION INTRAVENOUS at 10:39

## 2020-07-28 RX ADMIN — ROCURONIUM BROMIDE 30 MG: 10 INJECTION, SOLUTION INTRAVENOUS at 10:22

## 2020-07-28 RX ADMIN — ONDANSETRON 4 MG: 2 INJECTION INTRAMUSCULAR; INTRAVENOUS at 12:11

## 2020-07-28 RX ADMIN — FENTANYL CITRATE 50 MCG: 50 INJECTION INTRAMUSCULAR; INTRAVENOUS at 10:03

## 2020-07-28 RX ADMIN — SODIUM CHLORIDE, SODIUM LACTATE, POTASSIUM CHLORIDE, AND CALCIUM CHLORIDE: 600; 310; 30; 20 INJECTION, SOLUTION INTRAVENOUS at 09:47

## 2020-07-28 RX ADMIN — VANCOMYCIN HYDROCHLORIDE 1000 MG: 1 INJECTION, POWDER, LYOPHILIZED, FOR SOLUTION INTRAVENOUS at 17:50

## 2020-07-28 RX ADMIN — SUCCINYLCHOLINE CHLORIDE 140 MG: 20 INJECTION, SOLUTION INTRAMUSCULAR; INTRAVENOUS at 10:03

## 2020-07-28 RX ADMIN — ENOXAPARIN SODIUM 40 MG: 40 INJECTION SUBCUTANEOUS at 21:29

## 2020-07-28 RX ADMIN — LIDOCAINE HYDROCHLORIDE 100 MG: 20 INJECTION, SOLUTION EPIDURAL; INFILTRATION; INTRACAUDAL; PERINEURAL at 10:03

## 2020-07-28 RX ADMIN — ROCURONIUM BROMIDE 5 MG: 10 INJECTION, SOLUTION INTRAVENOUS at 11:28

## 2020-07-28 RX ADMIN — PHENYLEPHRINE HYDROCHLORIDE 200 MCG: 10 INJECTION INTRAVENOUS at 10:30

## 2020-07-28 RX ADMIN — GLYCOPYRROLATE 0.4 MG: 0.2 INJECTION, SOLUTION INTRAMUSCULAR; INTRAVENOUS at 12:11

## 2020-07-28 RX ADMIN — Medication 10 ML: at 22:18

## 2020-07-28 RX ADMIN — PHENYLEPHRINE HYDROCHLORIDE 50 MCG/MIN: 10 INJECTION INTRAVENOUS at 10:30

## 2020-07-28 RX ADMIN — FAMOTIDINE 20 MG: 20 TABLET, FILM COATED ORAL at 08:08

## 2020-07-28 RX ADMIN — PHENYLEPHRINE HYDROCHLORIDE 200 MCG: 10 INJECTION INTRAVENOUS at 10:07

## 2020-07-28 RX ADMIN — SODIUM CHLORIDE, SODIUM LACTATE, POTASSIUM CHLORIDE, AND CALCIUM CHLORIDE: 600; 310; 30; 20 INJECTION, SOLUTION INTRAVENOUS at 10:10

## 2020-07-28 RX ADMIN — Medication 1 MG: at 12:16

## 2020-07-28 RX ADMIN — PROPOFOL INJECTABLE EMULSION 15 MCG/KG/MIN: 10 INJECTION, EMULSION INTRAVENOUS at 22:17

## 2020-07-28 RX ADMIN — SODIUM CHLORIDE, SODIUM LACTATE, POTASSIUM CHLORIDE, AND CALCIUM CHLORIDE 75 ML/HR: 600; 310; 30; 20 INJECTION, SOLUTION INTRAVENOUS at 08:08

## 2020-07-28 RX ADMIN — PIPERACILLIN AND TAZOBACTAM 3.38 G: 3; .375 INJECTION, POWDER, FOR SOLUTION INTRAVENOUS at 00:58

## 2020-07-28 RX ADMIN — CEFTRIAXONE 2 G: 2 INJECTION, POWDER, FOR SOLUTION INTRAMUSCULAR; INTRAVENOUS at 16:55

## 2020-07-28 RX ADMIN — PHENYLEPHRINE HYDROCHLORIDE 200 MCG: 10 INJECTION INTRAVENOUS at 10:21

## 2020-07-28 RX ADMIN — PROPOFOL 130 MG: 10 INJECTION, EMULSION INTRAVENOUS at 10:03

## 2020-07-28 RX ADMIN — ROCURONIUM BROMIDE 10 MG: 10 INJECTION, SOLUTION INTRAVENOUS at 10:03

## 2020-07-28 RX ADMIN — IPRATROPIUM BROMIDE AND ALBUTEROL SULFATE 3 ML: .5; 3 SOLUTION RESPIRATORY (INHALATION) at 13:04

## 2020-07-28 RX ADMIN — ALBUMIN (HUMAN) 12.5 G: 0.25 INJECTION, SOLUTION INTRAVENOUS at 17:49

## 2020-07-28 RX ADMIN — FAMOTIDINE 20 MG: 10 INJECTION INTRAVENOUS at 22:15

## 2020-07-28 RX ADMIN — Medication 3 MG: at 12:11

## 2020-07-28 RX ADMIN — DILTIAZEM HYDROCHLORIDE 120 MG: 120 CAPSULE, COATED, EXTENDED RELEASE ORAL at 07:12

## 2020-07-28 RX ADMIN — Medication 20 ML: at 22:17

## 2020-07-28 RX ADMIN — PIPERACILLIN AND TAZOBACTAM 3.38 G: 3; .375 INJECTION, POWDER, FOR SOLUTION INTRAVENOUS at 08:00

## 2020-07-28 RX ADMIN — PHENYLEPHRINE HYDROCHLORIDE 30 MCG/MIN: 10 INJECTION INTRAVENOUS at 13:28

## 2020-07-28 RX ADMIN — PROPOFOL INJECTABLE EMULSION 10 MCG/KG/MIN: 10 INJECTION, EMULSION INTRAVENOUS at 13:28

## 2020-07-28 RX ADMIN — METRONIDAZOLE 500 MG: 500 INJECTION, SOLUTION INTRAVENOUS at 21:29

## 2020-07-28 RX ADMIN — PHENYLEPHRINE HYDROCHLORIDE 200 MCG: 10 INJECTION INTRAVENOUS at 10:16

## 2020-07-28 NOTE — CONSULTS
Critical Care NOTE    Micah Khan    7/28/2020    Date of Admission:  7/21/2020    The patient's chart is reviewed and the patient is discussed with the staff. Subjective:     Patient is a 81 y.o.  male, PMH HTN, Gout, COPD, HLD, HTN, and rectocutaneous fistula, seen and evaluated at the request of Dr. Megan Olivares for difficulty weaning patient from ventilator. The patient came in initially on 7/21/2020 after being found to have a significant sacral wound at home by home health. The patient evidently had been experiencing poor intake of food and fluids while at home as well. Today Dr. Mei Muniz performed sigmoidectomy with colostomy creation and incision and drainaged of sacrum. The patient is a former smoker and was last seen in our office last month; at that time spirometry was performed revealing FEV1 36%. He was placed on Anoro daily with as needed albuterol. Patient also had JEANNETTE which revealed need for 2L O2 NC nightly. The patient was transported to PACU following surgery where it has been difficult to wean patient from ventilator. He was re intubated and we are asked to assist in his vent management      Review of Systems  Unable to assess    Patient Active Problem List   Diagnosis Code    Atherosclerosis of native artery of extremity with intermittent claudication ([facility] I70.219    Former cigarette smoker Z87.891    HTN (hypertension) I10    Hyperlipidemia E78.5    Gout M10.9    Peripheral vascular disease ([facility] [address] I73.9    Tophaceous gout M1A. 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 Prior to Admission Medications   Prescriptions Last Dose Informant Patient Reported? Taking?   acetaminophen (TYLENOL) 500 mg tablet   Yes Yes   Sig: Take 1,000 mg by mouth every six (6) hours as needed for Pain. albuterol (ProAir HFA) 90 mcg/actuation inhaler   No No   Sig: Take 2 Puffs by inhalation every four (4) hours as needed for Wheezing or Shortness of Breath. albuterol (ProAir HFA) 90 mcg/actuation inhaler   No Yes   Sig: Take 2 Puffs by inhalation every six (6) hours as needed for Wheezing or Shortness of Breath. cilostazoL (PLETAL) 100 mg tablet   Yes Yes   Sig: Take  by mouth Before breakfast and dinner. clopidogreL (Plavix) 75 mg tab   Yes Yes   Sig: Take 75 mg by mouth. dilTIAZem CD (CARDIZEM CD) 120 mg ER capsule   No Yes   Sig: Take 1 Cap by mouth daily. doxycycline (MONODOX) 100 mg capsule   No No   Sig: Take 1 Cap by mouth two (2) times a day for 7 days. furosemide (Lasix) 40 mg tablet   No Yes   Sig: Take 1 Tab by mouth daily. probenecid-colchicine (ColBenemid) 500-0.5 mg per tablet   No Yes   Sig: TAKE ONE TABLET BY MOUTH DAILY   umeclidinium-vilanteroL (Anoro Ellipta) 62.5-25 mcg/actuation inhaler   No Yes   Sig: Take 1 Puff by inhalation daily.       Facility-Administered Medications: None       Past Medical History:   Diagnosis Date    Atherosclerosis of native arteries of the extremities with intermittent claudication 5/13/2014    Chronic kidney disease     elevated labs see's dr Felisha Cote ulcer ([facility] 9/29/2017    Former cigarette smoker 5/13/2014    Gout     HTN (hypertension) 5/13/2014    Hypercholesterolemia     Hyperlipidemia 5/13/2014    Peripheral vascular disease ([facility]      Past Surgical History:   Procedure Laterality Date    HX COLONOSCOPY      HX HEMORRHOIDECTOMY      HX MALIGNANT SKIN LESION EXCISION       Social History     Socioeconomic History    Marital status:      Spouse name: Not on file    Number of children: Not on file    Years of education: Not on file    Highest education level: Not on file   Occupational History    Not on file   Social Needs    Financial resource strain: Not on file    Food insecurity     Worry: Not on file     Inability: Not on file   [employer] needs     Medical: Not on file     Non-medical: Not on file   Tobacco Use    Smoking status: Former Smoker     Packs/day: 2.00     Years: 25.00     Pack years: 50.00     Types: Cigarettes    Smokeless tobacco: Former User   Substance and Sexual Activity    Alcohol use:  Yes     Alcohol/week: 6.7 standard drinks     Types: 8 Shots of liquor per week    Drug use: Not on file    Sexual activity: Not on file   Lifestyle    Physical activity     Days per week: Not on file     Minutes per session: Not on file    Stress: Not on file   Relationships    Social connections     Talks on phone: Not on file     Gets together: Not on file     Attends Spiritism service: Not on file     Active member of club or organization: Not on file     Attends meetings of clubs or organizations: Not on file     Relationship status: Not on file    Intimate partner violence     Fear of current or ex partner: Not on file     Emotionally abused: Not on file     Physically abused: Not on file     Forced sexual activity: Not on file   Other Topics Concern    Not on file   Social History Narrative    Not on file     Family History   Problem Relation Age of Onset    Hypertension Mother     Breast Cancer Mother Allergies   Allergen Reactions    Aspirin Swelling    Prednisone Myalgia       Current Facility-Administered Medications   Medication Dose Route Frequency    lactated Ringers infusion  100 mL/hr IntraVENous CONTINUOUS    sodium chloride (NS) flush 5-40 mL  5-40 mL IntraVENous Q8H    sodium chloride (NS) flush 5-40 mL  5-40 mL IntraVENous PRN    oxyCODONE IR (ROXICODONE) tablet 5 mg  5 mg Oral ONCE PRN    oxyCODONE-acetaminophen (PERCOCET) 5-325 mg per tablet 1 Tab  1 Tab Oral PRN    HYDROmorphone (PF) (DILAUDID) injection 0.5 mg  0.5 mg IntraVENous Multiple    promethazine (PHENERGAN) with saline injection 6.25 mg  6.25 mg IntraVENous ONCE PRN    diphenhydrAMINE (BENADRYL) injection 12.5 mg  12.5 mg IntraVENous ONCE PRN    0.9% sodium chloride infusion 250 mL  250 mL IntraVENous PRN    PHENYLephrine (RIGO-SYNEPHRINE) 30 mg in 0.9% sodium chloride 250 mL infusion   mcg/min IntraVENous TITRATE    propofol (DIPRIVAN) 10 mg/mL infusion  0-50 mcg/kg/min IntraVENous TITRATE    albuterol-ipratropium (DUO-NEB) 2.5 mg-0.5 mg/3 ml nebulizer solution        cefTRIAXone (ROCEPHIN) 2 g in 0.9% sodium chloride (MBP/ADV) 50 mL  2 g IntraVENous Q24H    metroNIDAZOLE (FLAGYL) IVPB premix 500 mg  500 mg IntraVENous Q12H    albumin human 25% (BUMINATE) solution 12.5 g  12.5 g IntraVENous Q6H    albuterol (PROVENTIL HFA, VENTOLIN HFA, PROAIR HFA) inhaler 2 Puff  2 Puff Inhalation QID RT    calcium carbonate (TUMS) chewable tablet 200 mg [elemental]  200 mg Oral TID WITH MEALS    heparin (porcine) pf 300 Units  300 Units InterCATHeter Q8H PRN    LORazepam (ATIVAN) injection 1 mg  1 mg IntraVENous Q6H PRN    vancomycin (VANCOCIN) 1,000 mg in 0.9% sodium chloride (MBP/ADV) 250 mL  1,000 mg IntraVENous Q18H    ondansetron (ZOFRAN) injection 4 mg  4 mg IntraVENous J4H PRN    folic acid (FOLVITE) tablet 1 mg  1 mg Oral DAILY    thiamine HCL (B-1) tablet 100 mg  100 mg Oral DAILY    NUTRITIONAL SUPPORT ELECTROLYTE PRN ORDERS   Does Not Apply PRN    central line flush (saline) syringe 20 mL  20 mL InterCATHeter Q8H    sodium chloride (NS) flush 5-40 mL  5-40 mL IntraVENous Q8H    sodium chloride (NS) flush 5-40 mL  5-40 mL IntraVENous PRN    enoxaparin (LOVENOX) injection 40 mg  40 mg SubCUTAneous Q24H    [Held by provider] clopidogreL (PLAVIX) tablet 75 mg  75 mg Oral DAILY    albuterol (PROVENTIL VENTOLIN) nebulizer solution 2.5 mg  2.5 mg Nebulization Q4H PRN    dilTIAZem ER (CARDIZEM CD) capsule 120 mg  120 mg Oral DAILY    probenecid-colchicine (ColBenemid) tablet 1 Tab (Patient Supplied)  1 Tab Oral DAILY    LORazepam (ATIVAN) tablet 1 mg  1 mg Oral Q6H PRN    morphine injection 2 mg  2 mg IntraVENous Q4H PRN    acetaminophen (TYLENOL) tablet 650 mg  650 mg Oral Q4H PRN         Objective:     Vitals:    07/28/20 1624 07/28/20 1629 07/28/20 1634 07/28/20 1717   BP: 100/60 101/61 102/59    Pulse: 62 63 60 61   Resp:    20   Temp:       SpO2: 98% 98% 98% 100%   Weight:       Height:           PHYSICAL EXAM     Constitutional:  the patient is orally intubated, on vent  EENMT:  Sclera clear, pupils equal, oral mucosa moist  Respiratory: decreased BS in the bases  Cardiovascular:  RRR without M,G,R  Gastrointestinal: soft and tender; with no bowel sounds. + TIARRA drain and colostomy  Musculoskeletal: warm without cyanosis. There is  lower extremity edema. Skin:  no jaundice or rashes,  wounds   Neurologic: no gross neuro deficits     Psychiatric:  sedated    CXR: 7/28/2020     Impression:  Interval intubation, with improved lung volumes.     Recent Labs     07/27/20  0401 07/26/20  1430   WBC 13.6* 13.4*   HGB 9.4* 10.3*   HCT 30.0* 32.7*    324     Recent Labs     07/28/20  0442 07/27/20  0401 07/26/20  1430    146* 143   K 3.7 3.8 5.5*   * 114* 117*   GLU 91 151* 119*   CO2 24 24 23   BUN 28* 26* 26*   CREA 0.72* 0.85 0.86   MG  --  2.5* 2.4   PHOS  --  3.1 3.6   CA 8.9 9.5 8.6   ALB 1. 5* 2.0* 1.3*   TBILI 0.2 0.2 0.2   ALT 92* 64 76*     Recent Labs     07/28/20  1504 07/27/20  0320 07/26/20  1943   PHI 7.31* 7.47* 7.34*   PCO2I 44.9 30.4* 40.3   PO2I 129* 77 152*   HCO3I 22.4 21.9* 21.6*     Recent Labs     07/26/20  1726 07/26/20  1345   LAC 1.0 0.5       Assessment:  (Medical Decision Making)     Hospital Problems  Date Reviewed: 7/20/2020          Codes Class Noted POA    Shock, unspecified ([facility] ICD-10-CM: R57.9  ICD-9-CM: 785.50  7/27/2020 Unknown        Metabolic encephalopathy Q-04-NH: G93.41  ICD-9-CM: 348.31  7/27/2020 Unknown        Hyperchloremic acidosis ICD-10-CM: E87.2  ICD-9-CM: 276.2  7/26/2020 Unknown        Hypoproteinemia ([facility] ICD-10-CM: E77.8  ICD-9-CM: 273.8  7/26/2020 Unknown        Hypomagnesemia ICD-10-CM: E83.42  ICD-9-CM: 275.2  7/22/2020 Unknown        * (Principal) Severe sepsis ([facility] ICD-10-CM: A41.9, R65.20  ICD-9-CM: 038.9, 995.92  7/21/2020 Unknown        Pressure injury of deep tissue of sacral region ICD-10-CM: L89.156  ICD-9-CM: 707.03, 707.20  7/21/2020 Unknown        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-CM: K60.4  ICD-9-CM: 565.1  7/21/2020 Unknown        Hyponatremia ICD-10-CM: E87.1  ICD-9-CM: 276.1  7/21/2020 Unknown        Hypokalemia ICD-10-CM: E87.6  ICD-9-CM: 276.8  7/21/2020 Unknown        Acute kidney injury (MARY) with acute tubular necrosis (ATN) ([facility] ICD-10-CM: N17.0  ICD-9-CM: 584.5  7/21/2020 Unknown        Acute on chronic respiratory failure with hypoxia and hypercapnia (HCC) ICD-10-CM: J96.21, J96.22  ICD-9-CM: 518.84, 786.09, 799.02  7/21/2020         Normocytic anemia ICD-10-CM: D64.9  ICD-9-CM: 285.9  7/21/2020 Unknown        Severe protein-calorie malnutrition ([facility] (Chronic) ICD-10-CM: E43  ICD-9-CM: 899  7/21/2020 Yes        Chronic obstructive pulmonary disease ([facility] ICD-10-CM: J44.9  ICD-9-CM: 540  6/22/2020 Yes        Tophaceous gout ICD-10-CM: M1A. 1FM3  ICD-9-CM: 274.03  5/18/2018 Unknown        Atherosclerosis of native artery of extremity with intermittent claudication (HCC) (Chronic) ICD-10-CM: I70.219  ICD-9-CM: 440.21  5/13/2014 Yes        Former cigarette smoker ICD-10-CM: U95.389  ICD-9-CM: V15.82  5/13/2014 Yes        HTN (hypertension) (Chronic) ICD-10-CM: I10  ICD-9-CM: 401.9  5/13/2014 Yes              Plan:  (Medical Decision Making)     --Continue mechanical ventilation support, wean as tolerated  --Continue albuterol prn, on Duonebs  --On Rocephin, Vancomycin, Flagyl  --Previous COVID testing on 7/26/20 negative  --Full Code  --IV albumin  --wean Oscar drip when able  --DVT/GI prophylaxis      More than 50% of the time documented was spent in face-to-face contact with the patient and in the care of the patient on the floor/unit where the patient is located. Thank you very much for this referral.  We appreciate the opportunity to participate in this patient's care. Will follow along with above stated plan.     Uday Ordaz MD

## 2020-07-28 NOTE — PROGRESS NOTES
Patient has not returned to floor from PACU. Patient's wife called requesting an update. Called recovery to obtain update and patient was transferred to PACU overflow bed 12 for urgent transfer. Dr. Viviana Woodward, anesthesiologist, is going to call patient's wife as well as Dr. Bebe Oshea.   I spoke with patient's wife and apologized that I did not have an adequate update. Contact information given to Adina RN in PACU. Patient's wife's name is Bib Navarro at [phone].

## 2020-07-28 NOTE — PROGRESS NOTES
Pt still on vent post op. Pt is on phenylephrine. Will sign off. Spoke to Nursing staff - Angie Arias. Will sign off  Intensivist will be the primary.

## 2020-07-28 NOTE — ANESTHESIA POSTPROCEDURE EVALUATION
Procedure(s):  COLOSTOMY CREATION DIVERTING LAPAROSCOPIC/ 632  INCISION AND DRAINAGE OF SACRUM - 1ST PROCEDURE LATERAL. general    Anesthesia Post Evaluation      Multimodal analgesia: multimodal analgesia used between 6 hours prior to anesthesia start to PACU discharge  Patient location during evaluation: ICU  Patient participation: complete - patient cannot participate  Post-procedure mental status: sedated. Pain management: adequate  Airway patency: patent (OETT)  Anesthetic complications: no  Cardiovascular status: acceptable and stable  Respiratory status: acceptable, ETT, intubated and ventilator  Hydration status: stable  Comments: Sat 83% Reintubated after 20 minutes in PACU. Taken with NRBM and WOB continued to increase. Will get CXR after intubation. Dr. Maryellen Rosario notified. CXR showed good OETT placement. Attempted weaning again in PACU without success. Will transfer to ICU for overnight ventilation. 7.5 OETT using glidescope # 3. Attempt x 1 after preoxygenation and propofol 60 mg and succinylcholine 60 mg. Positive ETCO2 on indicator and BS equal and bilateral.  Taped at 23 cm at lips by RT; placed on vent. Attraumatic. Received notice from RN that patient's daughter, Yohannes Omalley, would like to be contacted; spoke with her by phone at around 6:15 pm.  She was upset that she had not been updated about patient's status since 12:15 by Dr. Maryellen Rosario.  I explained to her the course in PACU requiring reintubation and overnight stay in ICU (which is in PACU tonight). She was given my number and the number for the nurses station in the PACU for communication tonight. INITIAL Post-op Vital signs:   Vitals Value Taken Time   /65 7/28/2020  1:34 PM   Temp 36.9 °C (98.5 °F) 7/28/2020 12:42 PM   Pulse 74 7/28/2020  1:36 PM   Resp 20 7/28/2020  1:27 PM   SpO2 100 % 7/28/2020  1:36 PM   Vitals shown include unvalidated device data.

## 2020-07-28 NOTE — PROGRESS NOTES
Pharmacokinetic Consult to Pharmacist    Santos Ramirezks is a 81 y.o. male being treated for skin and structure infection  with vanc/zosyn/clindamycin. Height: 5' 8\" (172.7 cm)  Weight: 82.9 kg (182 lb 11.2 oz)  Lab Results   Component Value Date/Time    BUN 28 (H) 07/28/2020 04:42 AM    Creatinine 0.72 (L) 07/28/2020 04:42 AM    WBC 13.6 (H) 07/27/2020 04:01 AM    Lactic acid 1.0 07/26/2020 05:26 PM      Estimated Creatinine Clearance: 80.2 mL/min (A) (based on SCr of 0.72 mg/dL (L)). Lab Results   Component Value Date/Time    Vancomycin,trough 15.2 07/27/2020 11:36 PM     Day 8 of vancomycin. Goal trough is 15-20mcg/ml. Trough therapeutic - will continue current regimen. Will continue to follow patient and order levels when clinically indicated.     Thank you,  Cristopher Murrell, PharmD, [address]  Clinical Pharmacist  [phone]

## 2020-07-28 NOTE — WOUND CARE
Noted patient going to surgery this am for colostomy and debridement. Wound/ostomy team will follow up tomorrow for both issues.

## 2020-07-28 NOTE — BRIEF OP NOTE
Brief Postoperative Note    Patient: Savanah De Paz  YOB: 1935  MRN: [id number]    Date of Procedure: 7/28/2020     Pre-Op Diagnosis: Sacral wound, initial encounter [S31.000A]    Post-Op Diagnosis: Same as preoperative diagnosis. Procedure(s):  Sigmoidectomy with COLOSTOMY CREATION DIVERTING LAPAROSCOPIC/ 632  INCISION AND DRAINAGE OF SACRUM - 1ST PROCEDURE LATERAL, excisional down to fascia    Surgeon(s): Gage Perez MD    Surgical Assistant: Nurse Practitioner: Baltazar Irby NP    Anesthesia: General     Estimated Blood Loss (mL): less than 50     Complications: None    Specimens:   ID Type Source Tests Collected by Time Destination   1 : SIGMOID COLON Fresh Colon  Gage Perez MD 7/28/2020 12:03 PM Pathology        Implants: * No implants in log *    Drains:   Menendez Beets #1 07/28/20 Right; Anterior Abdomen (Active)       Condom Catheter 07/23/20 (Active)   Indications for Use Open sacral or perineal wounds 07/27/20 0701   Status Draining;Patent 07/27/20 0701   Site Condition No abnormalities 07/27/20 0701   Drainage Tube Clipped to Bed Yes 07/27/20 0701   Catheter Secured to Thigh Yes 07/27/20 0701   Tamper Seal Intact Yes 07/27/20 0701   Bag Below Bladder/Not on Floor Yes 07/27/20 0701   Lack of Dependent Loop in Tubing Yes 07/27/20 0701   Drainage Bag Less Than Half Full Yes 07/27/20 0701   Sterile Solution Used for  Irrigation N/A 07/27/20 0701   Urine Output (mL) 550 ml 07/28/20 0401       Findings: 1 more necrotic tissue is debrided down to fascia, overall better wound    Electronically Signed by Nahomi Red MD on 7/28/2020 at 12:16 PM

## 2020-07-28 NOTE — PROGRESS NOTES
TRANSFER - IN REPORT:    Verbal report received from Jackie Hedrick on Nikky Sow  being received from PACU for urgent transfer      Report consisted of patients Situation, Background, Assessment and   Recommendations(SBAR). Information from the following report(s) SBAR, Kardex, OR Summary, Procedure Summary, Intake/Output, MAR, Accordion, Recent Results, Cardiac Rhythm NSR and Alarm Parameters  was reviewed with the receiving nurse. Opportunity for questions and clarification was provided. Assessment completed upon patients arrival to unit and care assumed.

## 2020-07-28 NOTE — PROGRESS NOTES
Comprehensive Nutrition Assessment    Type and Reason for Visit: Reassess(TPN management)    Nutrition Assessment:  Admitted w/ severe sepsis . 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 PMH remarkable for COPD, PVD, gout, CKD, and ETOH abuse. Patient reported to RD last assessment poor PO and weight loss for ~6 months prior to admission. PPN started 7/22, changed to TPN 7/23.    7/27: TPN was reordered for last evening and then discontinued by MD with intent to encourage PO intake. Patient is s/p debridement and diverting colostomy today, still in PACU during RD assessment. Order in for transfer to ICU. Patient was re-intubated in PACU due to labored breathing and sats in the 80s. Malnutrition Assessment:  Malnutrition Status:  Severe malnutrition    Context:  Acute illness     Findings of the 6 clinical characteristics of malnutrition:   Energy Intake:  7 - 50% or less of est energy requirements for 5 or more days  Body Fat Loss:  7 - Moderate body fat loss, Triceps   Muscle Mass Loss:  7 - Moderate muscle mass loss, Calf, Clavicles (pectoralis & deltoids), Hand (interosseous), Scapula (trapezius), Temples (temporalis)    Estimated Daily Nutrient Needs:  Energy (kcal):  7799-4956(30-35kcal/kg IBW 70kg secondary edema, hx CKD, wound  Protein (g):  105-150(1.5-2 g/kg IBW d/t wnd)-GFR now >60       Fluid (ml/day):  1ml/kcal/day    Wounds:    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 · BMI Categories:  Overweight (BMI 25.0-29. 9)       Nutrition Diagnosis:   · Severe malnutrition related to catabolic illness, inadequate protein-energy intake as evidenced by (malnutrition criteria as identified above)    · Inadequate oral intake related to (medical status) as evidenced by (NPO, on vent)      Nutrition Interventions:   Food and/or Nutrient Delivery: (Advance diet as medically appropriate vs nutrition support.)  If unable to advance diet in next 48-72 hrs, consider nutrition support. EN would be preferred method if okay with surgery. Goals:  Meet at least 75% nutrition nees within 7 days, either PO or nutrition support       Nutrition Monitoring and Evaluation:   Food/Nutrient Intake Outcomes: Food and nutrient intake  Physical Signs/Symptoms Outcomes: Skin(wound healing)    Discharge Planning:     Too soon to determine     [address] [place], LD on 7/28/2020 at 3:49 PM  Contact: [phone]

## 2020-07-28 NOTE — PERIOP NOTES
Dr. Adelina Tapia at bedside to assess pt's respiratory status. Pt with labored breathing, 88% on NRB, then to 82% while getting breathing treatment. Dr. Adelina Tapia to intubate at bedside.  RT called, XR called for portable chest.

## 2020-07-28 NOTE — PROGRESS NOTES
Patient was intubated with a number 7.5 ET Tube. Tube placement verified by auscultation and ETCO2 monitor. ET Tube is secured at the 23 cm av at the teeth and on the right side. Patient was intubated by Dr. Vignesh Hinton on the 1 attempt. Breath sounds are diminished. Patient is Negative for subcutaneous air and chest excursion is symmetric. Trachea is midline. Patient is also Negative for cyanosis and is Negative for pitting edema. Patient placed on ventilator on documented settings. All alarms are set and audible. Resuscitation bag is at the head of the bed. Ventilator Settings  Mode FIO2 Rate Tidal Volume Pressure PEEP I:E Ratio   Assist control, VC+  80 %          8 cm H20  1:2.3      Peak airway pressure: 20 cm H2O   Minute ventilation: 8.9 l/min     ABG: No results for input(s): PH, PCO2, PO2, HCO3 in the last 72 hours.

## 2020-07-28 NOTE — PROGRESS NOTES
Problem: Pressure Injury - Risk of  Goal: *Prevention of pressure injury  Description: Document Luc Scale and appropriate interventions in the flowsheet. Outcome: Progressing Towards Goal  Note: Pressure Injury Interventions:  Sensory Interventions: Assess changes in LOC    Moisture Interventions: Absorbent underpads    Activity Interventions: Pressure redistribution bed/mattress(bed type)    Mobility Interventions: Pressure redistribution bed/mattress (bed type)    Nutrition Interventions: Document food/fluid/supplement intake    Friction and Shear Interventions: Minimize layers                Problem: Patient Education: Go to Patient Education Activity  Goal: Patient/Family Education  Outcome: Progressing Towards Goal     Problem: Falls - Risk of  Goal: *Absence of Falls  Description: Document Robert Fall Risk and appropriate interventions in the flowsheet.   Outcome: Progressing Towards Goal  Note: Fall Risk Interventions:  Mobility Interventions: Bed/chair exit alarm         Medication Interventions: Bed/chair exit alarm    Elimination Interventions: Bed/chair exit alarm    History of Falls Interventions: Bed/chair exit alarm         Problem: Patient Education: Go to Patient Education Activity  Goal: Patient/Family Education  Outcome: Progressing Towards Goal     Problem: Patient Education: Go to Patient Education Activity  Goal: Patient/Family Education  Outcome: Progressing Towards Goal     Problem: Delirium Treatment  Goal: *Level of consciousness restored to baseline  Outcome: Progressing Towards Goal  Goal: *Level of environmental perceptions restored to baseline  Outcome: Progressing Towards Goal  Goal: *Sensory perception restored to baseline  Outcome: Progressing Towards Goal  Goal: *Emotional stability restored to baseline  Outcome: Progressing Towards Goal  Goal: *Functional assessment restored to baseline  Outcome: Progressing Towards Goal  Goal: *Absence of falls  Outcome: Progressing Towards Goal  Goal: *Will remain free of delirium, CAM Score negative  Outcome: Progressing Towards Goal  Goal: *Cognitive status will be restored to baseline  Outcome: Progressing Towards Goal  Goal: Interventions  Outcome: Progressing Towards Goal     Problem: Patient Education: Go to Patient Education Activity  Goal: Patient/Family Education  Outcome: Progressing Towards Goal

## 2020-07-28 NOTE — ANESTHESIA PROCEDURE NOTES
Arterial Line Placement    Start time: 7/28/2020 10:08 AM  End time: 7/28/2020 10:11 AM  Performed by: Yaw Sevilla MD  Authorized by: Yaw Sevilla MD     Pre-Procedure  Indications:  Arterial pressure monitoring and blood sampling  Preanesthetic Checklist: patient identified, risks and benefits discussed, anesthesia consent, patient being monitored, timeout performed and patient being monitored    Timeout Time: 10:08        Procedure:   Prep:  ChloraPrep  Orientation:  Left  Location:  Radial artery  Catheter size:  20 G  Number of attempts:  1  Cont Cardiac Output Sensor: No      Assessment:   Post-procedure:  Line secured and sterile dressing applied  Patient Tolerance:  Patient tolerated the procedure well with no immediate complications  Comment:   Abhishek's test negative

## 2020-07-28 NOTE — PROGRESS NOTES
Shift assessment completed. Refer to flow sheet for details. Pt is drowsy at this time. Open eyes to voice. No acute distress noted. On 3 L O2 via nasal cannula. Pt laying on his rt side at this time. Sacral wound with c/d/i dressing. Will change later. Condom cath in place draining clear yellow urine. Bed in low and locked position. Call light within reach.

## 2020-07-28 NOTE — PROGRESS NOTES
SPEECH PATHOLOGY NOTE:    Per chart review, patient NPO for surgery this AM. Will hold speech therapy this date and follow up at later time/date as patient is able to participate and as schedule permits.        Warden Carol ROJAS, CCC-SLP

## 2020-07-29 ENCOUNTER — APPOINTMENT (OUTPATIENT)
Dept: GENERAL RADIOLOGY | Age: 85
End: 2020-07-29
Attending: INTERNAL MEDICINE
Payer: MEDICARE

## 2020-07-29 ENCOUNTER — HOSPITAL ENCOUNTER (OUTPATIENT)
Age: 85
Discharge: NURSING, REHAB OR LONG-TERM CARE | End: 2020-08-28
Attending: INTERNAL MEDICINE | Admitting: INTERNAL MEDICINE
Payer: MEDICARE

## 2020-07-29 ENCOUNTER — APPOINTMENT (OUTPATIENT)
Dept: GENERAL RADIOLOGY | Age: 85
DRG: 853 | End: 2020-07-29
Attending: INTERNAL MEDICINE
Payer: MEDICARE

## 2020-07-29 VITALS
HEART RATE: 83 BPM | OXYGEN SATURATION: 94 % | SYSTOLIC BLOOD PRESSURE: 89 MMHG | HEIGHT: 68 IN | BODY MASS INDEX: 28 KG/M2 | RESPIRATION RATE: 17 BRPM | DIASTOLIC BLOOD PRESSURE: 53 MMHG | WEIGHT: 184.75 LBS | TEMPERATURE: 98.1 F

## 2020-07-29 PROBLEM — Z99.11 ENCOUNTER FOR WEANING FROM VENTILATOR (HCC): Status: ACTIVE | Noted: 2020-07-29

## 2020-07-29 LAB
ALBUMIN SERPL-MCNC: 1.8 G/DL (ref 3.2–4.6)
ALBUMIN/GLOB SERPL: 0.4 {RATIO} (ref 1.2–3.5)
ALP SERPL-CCNC: 139 U/L (ref 50–136)
ALT SERPL-CCNC: 71 U/L (ref 12–65)
ANION GAP SERPL CALC-SCNC: 8 MMOL/L (ref 7–16)
ARTERIAL PATENCY WRIST A: YES
AST SERPL-CCNC: 65 U/L (ref 15–37)
BASE DEFICIT BLD-SCNC: 5 MMOL/L
BDY SITE: ABNORMAL
BILIRUB SERPL-MCNC: 0.2 MG/DL (ref 0.2–1.1)
BUN SERPL-MCNC: 29 MG/DL (ref 8–23)
CALCIUM SERPL-MCNC: 8.8 MG/DL (ref 8.3–10.4)
CHLORIDE SERPL-SCNC: 114 MMOL/L (ref 98–107)
CO2 BLD-SCNC: 22 MMOL/L
CO2 SERPL-SCNC: 22 MMOL/L (ref 21–32)
COLLECT TIME,HTIME: 510
CREAT SERPL-MCNC: 0.87 MG/DL (ref 0.8–1.5)
ERYTHROCYTE [DISTWIDTH] IN BLOOD BY AUTOMATED COUNT: 14.5 % (ref 11.9–14.6)
ERYTHROCYTE [DISTWIDTH] IN BLOOD BY AUTOMATED COUNT: 14.5 % (ref 11.9–14.6)
ERYTHROCYTE [DISTWIDTH] IN BLOOD BY AUTOMATED COUNT: 14.6 % (ref 11.9–14.6)
EXHALED MINUTE VOLUME, VE: 8.6 L/MIN
GAS FLOW.O2 O2 DELIVERY SYS: ABNORMAL L/MIN
GAS FLOW.O2 SETTING OXYMISER: 14 BPM
GLOBULIN SER CALC-MCNC: 4.5 G/DL (ref 2.3–3.5)
GLUCOSE SERPL-MCNC: 93 MG/DL (ref 65–100)
HCO3 BLD-SCNC: 20.6 MMOL/L (ref 22–26)
HCT VFR BLD AUTO: 24.3 % (ref 41.1–50.3)
HCT VFR BLD AUTO: 24.7 % (ref 41.1–50.3)
HCT VFR BLD AUTO: 25.7 % (ref 41.1–50.3)
HGB BLD-MCNC: 7.5 G/DL (ref 13.6–17.2)
HGB BLD-MCNC: 7.8 G/DL (ref 13.6–17.2)
HGB BLD-MCNC: 8.1 G/DL (ref 13.6–17.2)
INSPIRATION.DURATION SETTING TIME VENT: 0.9 SEC
MAGNESIUM SERPL-MCNC: 2.4 MG/DL (ref 1.8–2.4)
MCH RBC QN AUTO: 31.1 PG (ref 26.1–32.9)
MCH RBC QN AUTO: 31.5 PG (ref 26.1–32.9)
MCH RBC QN AUTO: 31.7 PG (ref 26.1–32.9)
MCHC RBC AUTO-ENTMCNC: 30.9 G/DL (ref 31.4–35)
MCHC RBC AUTO-ENTMCNC: 31.5 G/DL (ref 31.4–35)
MCHC RBC AUTO-ENTMCNC: 31.6 G/DL (ref 31.4–35)
MCV RBC AUTO: 100 FL (ref 79.6–97.8)
MCV RBC AUTO: 100.4 FL (ref 79.6–97.8)
MCV RBC AUTO: 100.8 FL (ref 79.6–97.8)
NRBC # BLD: 0 K/UL (ref 0–0.2)
O2/TOTAL GAS SETTING VFR VENT: 30 %
PCO2 BLD: 37.7 MMHG (ref 35–45)
PEEP RESPIRATORY: 8 CMH2O
PH BLD: 7.35 [PH] (ref 7.35–7.45)
PHOSPHATE SERPL-MCNC: 4.5 MG/DL (ref 2.3–3.7)
PLATELET # BLD AUTO: 224 K/UL (ref 150–450)
PLATELET # BLD AUTO: 232 K/UL (ref 150–450)
PLATELET # BLD AUTO: 233 K/UL (ref 150–450)
PMV BLD AUTO: 9.4 FL (ref 9.4–12.3)
PMV BLD AUTO: 9.5 FL (ref 9.4–12.3)
PMV BLD AUTO: 9.6 FL (ref 9.4–12.3)
PO2 BLD: 133 MMHG (ref 75–100)
POTASSIUM SERPL-SCNC: 4.3 MMOL/L (ref 3.5–5.1)
PROT SERPL-MCNC: 6.3 G/DL (ref 6.3–8.2)
RBC # BLD AUTO: 2.41 M/UL (ref 4.23–5.6)
RBC # BLD AUTO: 2.46 M/UL (ref 4.23–5.6)
RBC # BLD AUTO: 2.57 M/UL (ref 4.23–5.6)
SAO2 % BLD: 99 % (ref 95–98)
SERVICE CMNT-IMP: ABNORMAL
SERVICE CMNT-IMP: ABNORMAL
SODIUM SERPL-SCNC: 144 MMOL/L (ref 136–145)
SPECIMEN TYPE: ABNORMAL
VENTILATION MODE VENT: ABNORMAL
VT SETTING VENT: 450 ML
WBC # BLD AUTO: 11.2 K/UL (ref 4.3–11.1)
WBC # BLD AUTO: 8.8 K/UL (ref 4.3–11.1)
WBC # BLD AUTO: 9.6 K/UL (ref 4.3–11.1)

## 2020-07-29 PROCEDURE — 71045 X-RAY EXAM CHEST 1 VIEW: CPT

## 2020-07-29 PROCEDURE — 74011250636 HC RX REV CODE- 250/636: Performed by: NURSE PRACTITIONER

## 2020-07-29 PROCEDURE — 77010033678 HC OXYGEN DAILY

## 2020-07-29 PROCEDURE — 99233 SBSQ HOSP IP/OBS HIGH 50: CPT | Performed by: INTERNAL MEDICINE

## 2020-07-29 PROCEDURE — 74011000250 HC RX REV CODE- 250: Performed by: INTERNAL MEDICINE

## 2020-07-29 PROCEDURE — 82803 BLOOD GASES ANY COMBINATION: CPT

## 2020-07-29 PROCEDURE — 36600 WITHDRAWAL OF ARTERIAL BLOOD: CPT

## 2020-07-29 PROCEDURE — 80053 COMPREHEN METABOLIC PANEL: CPT

## 2020-07-29 PROCEDURE — 74011250636 HC RX REV CODE- 250/636: Performed by: ANESTHESIOLOGY

## 2020-07-29 PROCEDURE — 85027 COMPLETE CBC AUTOMATED: CPT

## 2020-07-29 PROCEDURE — P9047 ALBUMIN (HUMAN), 25%, 50ML: HCPCS | Performed by: INTERNAL MEDICINE

## 2020-07-29 PROCEDURE — 94003 VENT MGMT INPAT SUBQ DAY: CPT

## 2020-07-29 PROCEDURE — 84100 ASSAY OF PHOSPHORUS: CPT

## 2020-07-29 PROCEDURE — 74011000250 HC RX REV CODE- 250: Performed by: NURSE PRACTITIONER

## 2020-07-29 PROCEDURE — 74011000258 HC RX REV CODE- 258: Performed by: NURSE PRACTITIONER

## 2020-07-29 PROCEDURE — 36415 COLL VENOUS BLD VENIPUNCTURE: CPT

## 2020-07-29 PROCEDURE — 94640 AIRWAY INHALATION TREATMENT: CPT

## 2020-07-29 PROCEDURE — 74011250636 HC RX REV CODE- 250/636: Performed by: INTERNAL MEDICINE

## 2020-07-29 PROCEDURE — 83735 ASSAY OF MAGNESIUM: CPT

## 2020-07-29 PROCEDURE — 74011250637 HC RX REV CODE- 250/637: Performed by: NURSE PRACTITIONER

## 2020-07-29 RX ORDER — ALBUTEROL SULFATE 0.83 MG/ML
2.5 SOLUTION RESPIRATORY (INHALATION)
Status: DISCONTINUED | OUTPATIENT
Start: 2020-07-29 | End: 2020-07-29 | Stop reason: HOSPADM

## 2020-07-29 RX ADMIN — ALBUMIN (HUMAN) 12.5 G: 0.25 INJECTION, SOLUTION INTRAVENOUS at 05:58

## 2020-07-29 RX ADMIN — PHENYLEPHRINE HYDROCHLORIDE 30 MCG/MIN: 10 INJECTION INTRAVENOUS at 14:53

## 2020-07-29 RX ADMIN — MORPHINE SULFATE 2 MG: 2 INJECTION, SOLUTION INTRAMUSCULAR; INTRAVENOUS at 03:09

## 2020-07-29 RX ADMIN — ALBUMIN (HUMAN) 12.5 G: 0.25 INJECTION, SOLUTION INTRAVENOUS at 00:36

## 2020-07-29 RX ADMIN — Medication 10 ML: at 14:00

## 2020-07-29 RX ADMIN — ALBUTEROL SULFATE 2.5 MG: 2.5 SOLUTION RESPIRATORY (INHALATION) at 11:40

## 2020-07-29 RX ADMIN — ALBUMIN (HUMAN) 12.5 G: 0.25 INJECTION, SOLUTION INTRAVENOUS at 11:46

## 2020-07-29 RX ADMIN — METRONIDAZOLE 500 MG: 500 INJECTION, SOLUTION INTRAVENOUS at 08:47

## 2020-07-29 RX ADMIN — Medication 20 ML: at 06:02

## 2020-07-29 RX ADMIN — ALBUTEROL SULFATE 2.5 MG: 2.5 SOLUTION RESPIRATORY (INHALATION) at 09:35

## 2020-07-29 RX ADMIN — Medication 10 ML: at 06:03

## 2020-07-29 RX ADMIN — FAMOTIDINE 20 MG: 10 INJECTION INTRAVENOUS at 08:47

## 2020-07-29 RX ADMIN — VANCOMYCIN HYDROCHLORIDE 1000 MG: 1 INJECTION, POWDER, LYOPHILIZED, FOR SOLUTION INTRAVENOUS at 09:19

## 2020-07-29 RX ADMIN — CEFTRIAXONE 2 G: 2 INJECTION, POWDER, FOR SOLUTION INTRAMUSCULAR; INTRAVENOUS at 15:28

## 2020-07-29 RX ADMIN — DAKIN'S SOLUTION 0.125% (QUARTER STRENGTH): 0.12 SOLUTION at 08:47

## 2020-07-29 RX ADMIN — PROBENECID AND COLCHICINE 1 TABLET: 500; .5 TABLET ORAL at 13:46

## 2020-07-29 RX ADMIN — Medication 20 ML: at 14:00

## 2020-07-29 RX ADMIN — SODIUM CHLORIDE 500 ML: 900 INJECTION, SOLUTION INTRAVENOUS at 15:00

## 2020-07-29 RX ADMIN — CALCIUM CARBONATE (ANTACID) CHEW TAB 500 MG 200 MG: 500 CHEW TAB at 16:01

## 2020-07-29 RX ADMIN — ALBUMIN (HUMAN) 12.5 G: 0.25 INJECTION, SOLUTION INTRAVENOUS at 18:08

## 2020-07-29 NOTE — PROGRESS NOTES
CXR noted better aeration and improved atelectasis compared to yesterday. WBC is down but noted Hg lower. F/u serial Hg/hct and transfuse per surgery.  ABG is fine this AM    Rylee Messer MD      LABS    Recent Labs     07/29/20  0309 07/27/20  0401 07/26/20  1430   WBC 11.2* 13.6* 13.4*   HGB 8.1* 9.4* 10.3*   HCT 25.7* 30.0* 32.7*    289 324     Recent Labs     07/29/20  0309 07/28/20  0442 07/27/20  0401 07/26/20  1430    143 146* 143   K 4.3 3.7 3.8 5.5*   * 113* 114* 117*   GLU 93 91 151* 119*   CO2 22 24 24 23   BUN 29* 28* 26* 26*   CREA 0.87 0.72* 0.85 0.86   MG 2.4  --  2.5* 2.4   PHOS 4.5*  --  3.1 3.6     Recent Labs     07/29/20  0515 07/28/20  1504 07/27/20  0320   PHI 7.35 7.31* 7.47*   PCO2I 37.7 44.9 30.4*   PO2I 133* 129* 77   HCO3I 20.6* 22.4 21.9*     Recent Labs     07/26/20  1726 07/26/20  1345   LAC 1.0 0.5

## 2020-07-29 NOTE — OP NOTES
[facility]  OPERATIVE REPORT    Name:  Orelia Fleischer  MR#:  [id number]  :  1935  ACCOUNT #:  [de-identified]  DATE OF SERVICE:  2020    PREOPERATIVE DIAGNOSIS:  Infected sacral wound with anal fistula. POSTOPERATIVE DIAGNOSIS:  Infected sacral wound with anal fistula. PROCEDURES PERFORMED:  1. Sacral wound debridement, excisional, down to the fascia. 2.  Laparoscopic sigmoidectomy with descending colostomy. SURGEON:  Marionette Bloch, MD    ASSISTANT:  Rhonda Dorantes NP    ANESTHESIA:  General.    COMPLICATIONS: none    SPECIMENS REMOVED: as above    IMPLANTS:  Collins x 1    ESTIMATED BLOOD LOSS:  About 50 mL. INDICATION:  This is an 52-year-old male who presented with infected sacral wound and further examination showed anal fistula draining right into the wound and he needed diverting colostomy. The patient understood risks and benefits, agreed to proceed. As noted, the patient spiked a fever. He was treated for pneumonia but he is clinically stable and we felt he is ready to proceed with surgery, although the surgical risk is high and this all discussed with the patient and family. They agreed to proceed. FINDINGS:  1. His sacral wound has more necrotic tissues. We were able to debride it completely down to the fascia and but this is overall improved compared to last week. 2.  He does have a redundant sigmoid colon. So, additional sigmoid were removed. PROCEDURE:  After informed consent obtained, the patient brought into the operating room and general anesthesia was administered. The patient was first positioned to lateral position. The sacral area was exposed and this was prepped with Betadine and then draped in the routine fashion. The wound was examined and there was no stool contamination but there were no necrotic tissues identified.   On the left superior, this involving the buttock muscles and fascia and this was all debrided and then good hemostasis obtained. I also cauterized the anal fistula tract and then the wound was packed with wet-to-dry. The patient repositioned into supine. His abdomen was prepped and draped in the routine fashion. Then the supraumbilical incision was made. Dorian cannula inserted. Pneumoperitoneum created and then I placed two 5-mm trocars, one in the right lower quadrant, one at the left upper quadrant at the presumed colostomy site. With steep Trendelenburg position, the pelvis and the colon was able to identify easily and he does have redundant sigmoid colon. I isolated the distal sigmoid and upper rectum first.  This was done circumferentially and then the Endo-PRAVEEN stapler was brought in. The distal sigmoid was divided and then I further divided the mesentery of the sigmoid colon proximally and also the attachment of sigmoid to the lateral pelvic wall was taken down. The entire sigmoid was completely released and then the left upper trocar site was enlarged and the sigmoid colon was able to brought up to the skin. I tried to pull up the sigmoid to reduce the redundancy and then additional sigmoid colon was removed. The surgical field was inspected and I decided to leave a drain in the pelvis. This is a #19 Collins and then the trocars were removed. The pneumoperitoneum was evacuated and the umbilical port was closed with 0 Vicryl in figure-of-eight fashion. Skin closed with 4-0 Vicryl in subcuticular running fashion after the incision was dressed and the colostomy was matured, this was done with interrupted 3-0 Vicryl stitch. The patient tolerated the procedure well, transferred to recovery room in stable condition. All the instrument count and lap count were correct. As noted, Rosette Negrete is the first assistant in this case. She offered excellent exposure to make this surgery quicker and easier.       Eugene Fenton MD      [id number]  D:  07/28/2020 12:25  T:  07/28/2020 23:24  JOB #:  [id number]

## 2020-07-29 NOTE — PROGRESS NOTES
Critical Care Daily Progress Note: 7/29/2020  Admission Date: 7/21/2020     The patient's chart is reviewed and the patient is discussed with the staff. 81 y.o.  male, PMH HTN, Gout, COPD, HLD, HTN, and rectocutaneous fistula, seen and evaluated at the request of Dr. Tushar Puckett for difficulty weaning patient from ventilator. The patient came in initially on 7/21/2020 after being found to have a significant sacral wound at home by home health. The patient evidently had been experiencing poor intake of food and fluids while at home as well. Today Dr. Asuncion Smiley performed sigmoidectomy with colostomy creation and incision and drainaged of sacrum. The patient is a former smoker and was last seen in our office last month; at that time spirometry was performed revealing FEV1 36%. He was placed on Anoro daily with as needed albuterol. Patient also had JEANNETTE which revealed need for 2L O2 NC nightly. The patient was transported to PACU following surgery where it has been difficult to wean patient from ventilator. He was re intubated and we are asked to assist in his vent management  Subjective:    Has remained on vent overntight- tolerating ps of 8, alert and  calm    Current Facility-Administered Medications   Medication Dose Route Frequency    albuterol (PROVENTIL VENTOLIN) nebulizer solution 2.5 mg  2.5 mg Nebulization QID RT    0.9% sodium chloride infusion 250 mL  250 mL IntraVENous PRN    PHENYLephrine (RIGO-SYNEPHRINE) 30 mg in 0.9% sodium chloride 250 mL infusion   mcg/min IntraVENous TITRATE    propofol (DIPRIVAN) 10 mg/mL infusion  0-50 mcg/kg/min IntraVENous TITRATE    cefTRIAXone (ROCEPHIN) 2 g in 0.9% sodium chloride (MBP/ADV) 50 mL  2 g IntraVENous Q24H    metroNIDAZOLE (FLAGYL) IVPB premix 500 mg  500 mg IntraVENous Q12H    albumin human 25% (BUMINATE) solution 12.5 g  12.5 g IntraVENous Q6H    famotidine (PF) (PEPCID) 20 mg in 0.9% sodium chloride 10 mL injection  20 mg IntraVENous Q12H    sodium hypochlorite (QUARTER STRENGTH DAKIN'S) 0.125% irrigation (bottle)   Topical Q12H    calcium carbonate (TUMS) chewable tablet 200 mg [elemental]  200 mg Oral TID WITH MEALS    heparin (porcine) pf 300 Units  300 Units InterCATHeter Q8H PRN    LORazepam (ATIVAN) injection 1 mg  1 mg IntraVENous Q6H PRN    vancomycin (VANCOCIN) 1,000 mg in 0.9% sodium chloride (MBP/ADV) 250 mL  1,000 mg IntraVENous Q18H    ondansetron (ZOFRAN) injection 4 mg  4 mg IntraVENous C7K PRN    folic acid (FOLVITE) tablet 1 mg  1 mg Oral DAILY    thiamine HCL (B-1) tablet 100 mg  100 mg Oral DAILY    NUTRITIONAL SUPPORT ELECTROLYTE PRN ORDERS   Does Not Apply PRN    central line flush (saline) syringe 20 mL  20 mL InterCATHeter Q8H    sodium chloride (NS) flush 5-40 mL  5-40 mL IntraVENous Q8H    sodium chloride (NS) flush 5-40 mL  5-40 mL IntraVENous PRN    [Held by provider] clopidogreL (PLAVIX) tablet 75 mg  75 mg Oral DAILY    dilTIAZem ER (CARDIZEM CD) capsule 120 mg  120 mg Oral DAILY    probenecid-colchicine (ColBenemid) tablet 1 Tab (Patient Supplied)  1 Tab Oral DAILY    LORazepam (ATIVAN) tablet 1 mg  1 mg Oral Q6H PRN    morphine injection 2 mg  2 mg IntraVENous Q4H PRN    acetaminophen (TYLENOL) tablet 650 mg  650 mg Oral Q4H PRN       Review of Systems   Unobtainable due to patient status. Objective:     Vitals:    07/29/20 0949 07/29/20 1003 07/29/20 1017 07/29/20 1032   BP: 103/52 97/55 91/52 114/56   Pulse: 71 69 67 74   Resp:       Temp:       SpO2: 100% 100% 100% 100%   Weight:       Height:             Intake/Output Summary (Last 24 hours) at 7/29/2020 1053  Last data filed at 7/29/2020 0548  Gross per 24 hour   Intake 2150.89 ml   Output 1515 ml   Net 635.89 ml         Physical Exam:          Constitutional:  intubated and mechanically ventilated.   EENMT:  Sclera clear, pupils equal, oral mucosa moist  Respiratory: clear  Cardiovascular:  RRR with no M,G,R;  Gastrointestinal:  soft with no tenderness; positive bowel sounds present  Musculoskeletal:  warm with no cyanosis, no lower extremity edema  Skin:  no jaundice or ecchymosis  Neurologic: no gross neuro deficits     Psychiatric:  alert and responsive    LINES:    ETT    DRIPS:    None now- was on propofol    CXR:    Some atelectasis per report    Ventilator Settings  Mode FIO2 Rate Tidal Volume Pressure PEEP   Pressure support  30 %    405 ml  8 cm H2O  8 cm H20      Peak airway pressure: 16 cm H2O   Minute ventilation: 8.09 l/min     ABG:   Recent Labs     07/29/20  0515 07/28/20  1504 07/27/20  0320   PHI 7.35 7.31* 7.47*   PCO2I 37.7 44.9 30.4*   PO2I 133* 129* 77   HCO3I 20.6* 22.4 21.9*       LAB  Recent Labs     07/27/20  0826 07/26/20  1339   GLUCPOC 127* 119*     Recent Labs     07/29/20  0309 07/27/20  0401 07/26/20  1430   WBC 11.2* 13.6* 13.4*   HGB 8.1* 9.4* 10.3*   HCT 25.7* 30.0* 32.7*    289 324     Recent Labs     07/29/20  0309 07/28/20  0442 07/27/20  0401 07/26/20  1430    143 146* 143   K 4.3 3.7 3.8 5.5*   * 113* 114* 117*   CO2 22 24 24 23   GLU 93 91 151* 119*   BUN 29* 28* 26* 26*   CREA 0.87 0.72* 0.85 0.86   MG 2.4  --  2.5* 2.4   PHOS 4.5*  --  3.1 3.6   CA 8.8 8.9 9.5 8.6   ALB 1.8* 1.5* 2.0* 1.3*     Recent Labs     07/26/20  1726 07/26/20  1345   LAC 1.0 0.5         Patient Active Problem List   Diagnosis Code    Atherosclerosis of native artery of extremity with intermittent claudication ([facility] I70.219    Former cigarette smoker Z87.891    HTN (hypertension) I10    Hyperlipidemia E78.5    Gout M10.9    Peripheral vascular disease (HCC) I73.9    Tophaceous gout M1A. 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) E77.8    Hyperkalemia E87.5    Shock, unspecified ([facility] U80.6    Metabolic encephalopathy A52.72    Encounter for weaning from ventilator [facility]) Z99.11         Assessment:  (Medical Decision Making)     Hospital Problems  Date Reviewed: 7/20/2020          Codes Class Noted POA    Encounter for weaning from ventilator [facility]) ICD-10-CM: Z99.11  ICD-9-CM: V46.13  7/29/2020 Unknown    Tolerating pressure support    Shock, unspecified ([facility] ICD-10-CM: R57.9  ICD-9-CM: 785.50  7/27/2020 Unknown    Not on pressors    Metabolic encephalopathy [facility]: G93.41  ICD-9-CM: 348.31  7/27/2020 Unknown        Hyperchloremic acidosis ICD-10-CM: E87.2  ICD-9-CM: 276.2  7/26/2020 Unknown        Hypoproteinemia ([facility] ICD-10-CM: E77.8  ICD-9-CM: 273.8  7/26/2020 Unknown        Hypomagnesemia ICD-10-CM: E83.42  ICD-9-CM: 275.2  7/22/2020 Unknown        * (Principal) Severe sepsis ([facility] ICD-10-CM: A41.9, R65.20  ICD-9-CM: 038.9, 995.92  7/21/2020 Unknown        Pressure injury of deep tissue of sacral region ICD-10-CM: L89.156  ICD-9-CM: 707.03, 707.20  7/21/2020 Unknown        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-CM: K60.4  ICD-9-CM: 565.1  7/21/2020 Unknown        Hyponatremia ICD-10-CM: E87.1  ICD-9-CM: 276.1  7/21/2020 Unknown        Hypokalemia ICD-10-CM: E87.6  ICD-9-CM: 276.8  7/21/2020 Unknown        Acute kidney injury (MARY) with acute tubular necrosis (ATN) ([facility] ICD-10-CM: N17.0  ICD-9-CM: 584.5  7/21/2020 Unknown    stable    Acute on chronic respiratory failure with hypoxia and hypercapnia (HCC) ICD-10-CM: J96.21, J96.22  ICD-9-CM: 518.84, 786.09, 799.02  7/21/2020     Failed extubation yesterday    Normocytic anemia ICD-10-CM: D64.9  ICD-9-CM: 285.9  7/21/2020 Unknown        Severe protein-calorie malnutrition (HCC) (Chronic) ICD-10-CM: M32  ICD-9-CM: 262  7/21/2020 Yes        Chronic obstructive pulmonary disease ([facility] ICD-10-CM: J44.9  ICD-9-CM: 647  6/22/2020 Yes        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  ICD-9-CM: 274.03  5/18/2018 Unknown        Atherosclerosis of native artery of extremity with intermittent claudication (HCC) (Chronic) ICD-10-CM: I70.219  ICD-9-CM: 440.21  5/13/2014 Yes        Former cigarette smoker ICD-10-CM: U45.482  ICD-9-CM: V15.82  5/13/2014 Yes        HTN (hypertension) (Chronic) ICD-10-CM: I10  ICD-9-CM: 401.9  5/13/2014 Yes              Plan:  (Medical Decision Making)   1   cpap trial, if ok extubate to bipap  2   antibx-flagyl, vanc, rocephine  3   albumin  --    More than 50% of the time documented was spent in face-to-face contact with the patient and in the care of the patient on the floor/unit where the patient is located.     Chhaya Hoskins MD

## 2020-07-29 NOTE — PROGRESS NOTES
Physical Therapy Note:    Therapist is discontinuing physical therapy at this time due to decline in medical status, still intubated following surgery. Mr. Whitman Means physical therapy goals were not met. Please reorder PT when our services are again appropriate.       Thank you,  Josee Leigh, PT, DPT  7/29/2020

## 2020-07-29 NOTE — PROGRESS NOTES
7/24/20        PLAN:  Care Management per primary  OK for diet per speech recommendation when he is extubated and stable for diet  Dressing changes BID to perineal area with Dakins solution  Ostomy nurse for ostomy teaching and wound care  DC TIARRA drain      ASSESSMENT:  Admit Date: 7/21/2020   1 Day Post-Op  Procedure(s):  COLOSTOMY CREATION DIVERTING LAPAROSCOPIC/ 632  INCISION AND DRAINAGE OF SACRUM - 1ST PROCEDURE LATERAL    Principal Problem:    Severe sepsis ([address] (7/21/2020)    Active Problems:    Atherosclerosis of native artery of extremity with intermittent claudication ([address] (5/13/2014)      Former cigarette smoker (5/13/2014)      HTN (hypertension) (5/13/2014)      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)      Chronic obstructive pulmonary disease ([address] (6/22/2020)      Pressure injury of deep tissue of sacral region (7/21/2020)      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)      Hyponatremia (7/21/2020)      Hypokalemia (7/21/2020)      Acute kidney injury (MARY) with acute tubular necrosis (ATN) ([address] (7/21/2020)      Acute on chronic respiratory failure with hypoxia and hypercapnia (HCC) (7/21/2020)      Normocytic anemia (7/21/2020)      Severe protein-calorie malnutrition ([address] (7/21/2020)      Hypomagnesemia (7/22/2020)      Hyperchloremic acidosis (7/26/2020)      Hypoproteinemia ([address] (7/26/2020)      Shock, unspecified ([address] (6/57/3592)      Metabolic encephalopathy (5/57/8427)      Encounter for weaning from ventilator ([address] (7/29/2020)         SUBJECTIVE:  re intubated after surgery yesterday and in ICU. Stoma viable. Abdomen slightly distended. Dressing to perineal area. Currently still intubated, opens eyes.  Will DC TIARRA drain today    OBJECTIVE:    Visit Vitals  BP 99/56   Pulse 64   Temp 98.1 °F (36.7 °C)   Resp 15   Ht 5' 8\" (1.727 m)   Wt 184 lb 11.9 oz (83.8 kg)   SpO2 100%   BMI 28.09 kg/m²     Eyes: Sclera are clear. ENMT: no external lesions gross hearing normal; no obvious neck masses, no ear or lip lesions  CV: . Normal perfusion  Resp: No JVD. Breathing is  non-labored; no audible wheezing. intubated    GI: soft and slight;y -distended, stoma viable. Ostomy not functioning yet   : clear yellow urine per leyva  Musculoskeletal: No embolic signs or cyanosis. Neuro: currently intubated.  Eyes open and nods  Psychiatric: ALTAGRACIA      Patient Vitals for the past 24 hrs:   BP Temp Pulse Resp SpO2 Weight   07/29/20 0833 99/56  64  100 %    07/29/20 0817 131/64  70  100 %    07/29/20 0802 99/57  62  100 %    07/29/20 0747 109/58  65  100 %    07/29/20 0732 116/64  68  100 %    07/29/20 0717 114/56  66  100 %    07/29/20 0702 134/65 98.1 °F (36.7 °C) 72 15 100 %    07/29/20 0647 108/59  (!) 58 15 100 %    07/29/20 0632 118/62  61 14 100 %    07/29/20 0617 117/66  61 15 99 %    07/29/20 0602 124/60  61 16 98 %    07/29/20 0548      184 lb 11.9 oz (83.8 kg)   07/29/20 0547 98/56  (!) 56 17 99 %    07/29/20 0532 99/57  (!) 56 17 98 %    07/29/20 0517 97/55  (!) 57 16 99 %    07/29/20 0502 (!) 83/51  (!) 56 15 99 %    07/29/20 0447 (!) 83/50  (!) 56 14 100 %    07/29/20 0433 91/55  60 18 99 %    07/29/20 0417 115/60  69 17 100 %    07/29/20 0402 115/60  71 16 100 %    07/29/20 0347 114/61 97.5 °F (36.4 °C) 82 14 100 %    07/29/20 0332 109/57  67 15 98 %    07/29/20 0317 116/62  65 17 99 %    07/29/20 0302 127/67  68 21 98 %    07/29/20 0247 100/58  (!) 53 20 99 %    07/29/20 0232 102/55  (!) 52 20 99 %    07/29/20 0229 116/65  (!) 53      07/29/20 0217 116/65  (!) 57 20 99 %    07/29/20 0202 114/62  (!) 53 20 99 %    07/29/20 0147 115/62  (!) 52 21 99 %    07/29/20 0132 112/64  (!) 52 20 99 %    07/29/20 0131   (!) 52 20 99 %    07/29/20 0117 109/61  (!) 52 21 99 %    07/29/20 0102 103/57  (!) 52 20 99 %    07/29/20 0047 101/57  (!) 54 21 99 %    07/29/20 0032 94/53  (!) 52 21 98 %    07/29/20 0017 95/52  (!) 53 20 98 %    07/29/20 0002 106/56  (!) 56 20 99 %    07/28/20 2347 115/60  60 21 98 %    07/28/20 2333 103/50 97.6 °F (36.4 °C) 76 22 99 %    07/28/20 2317 112/61  68 21 99 %    07/28/20 2302 107/58  62 20 98 %    07/28/20 2247 114/58  68 21 98 %    07/28/20 2232 115/59 97.6 °F (36.4 °C) 66 21 98 %    07/28/20 2217 100/54  60 20 98 %    07/28/20 2202 110/57  (!) 59 20 98 %    07/28/20 2148 92/51  (!) 54 20 98 %    07/28/20 2132 90/51  (!) 53 20 98 %    07/28/20 2117 98/55  (!) 54 20 99 %    07/28/20 2102 100/53  (!) 57 20 98 %    07/28/20 2048 109/57  61 21 98 %    07/28/20 2033 120/62  70 21 99 %    07/28/20 2021   (!) 56 20 99 %    07/28/20 2014 101/55  (!) 57 22 99 %    07/28/20 1959 105/58  (!) 56 22 99 %    07/28/20 1945 104/57  (!) 56 22 99 %    07/28/20 1929 117/67  (!) 58 22 99 %    07/28/20 1915 105/60 97.9 °F (36.6 °C) (!) 57 22 98 %    07/28/20 1859 100/60  (!) 56  98 %    07/28/20 1844 106/60  (!) 57  99 %    07/28/20 1829 101/60  (!) 58  99 %    07/28/20 1814 103/57  62  99 %    07/28/20 1759 109/59  60  99 %    07/28/20 1745 112/61  63  99 %    07/28/20 1735   67  99 %    07/28/20 1730 127/67  67 20 99 %    07/28/20 1717 103/58  61 20 100 %    07/28/20 1634 102/59  60  98 %    07/28/20 1629 101/61  63  98 %    07/28/20 1624 100/60  62  98 %    07/28/20 1619 98/58  68  98 %    07/28/20 1614 98/60  66  97 %    07/28/20 1609 97/56  66  97 %    07/28/20 1604 103/59  65  98 %    07/28/20 1559 98/57  65  98 %    07/28/20 1554 109/65  65  98 %    07/28/20 1550 105/61  67  98 %    07/28/20 1544 106/64  66  98 %    07/28/20 1540 103/56  62  98 %    07/28/20 1535 101/56  62  98 %    07/28/20 1529 113/61  62  98 %    07/28/20 1524 104/61  62 20 98 %    07/28/20 1519 106/61  63 20 98 %    07/28/20 1514 101/63  62 20 98 %    07/28/20 1509 101/60  65 20 98 %    07/28/20 1504 99/56  64 20 99 %    07/28/20 1459 (!) 89/55  65 20 99 %    07/28/20 1455 (!) 87/52  67 20 98 %    07/28/20 1434 101/64  81 20 99 %    07/28/20 1429 113/70  83 20 99 %    07/28/20 1424 114/67  84 20 99 %    07/28/20 1419 118/65  84 20 99 %    07/28/20 1414 122/67  86 20 99 %    07/28/20 1410 136/77  92 20 99 %    07/28/20 1405 137/76  90 20 99 %    07/28/20 1359 139/80  89 20 99 %    07/28/20 1354 139/79  89 20 99 %    07/28/20 1349 137/77 98.8 °F (37.1 °C) 86 20 99 %    07/28/20 1344 138/71  84 20 99 %    07/28/20 1339 114/65  76 20 99 %    07/28/20 1334 116/65  82 20 100 %    07/28/20 1331 104/59  75 20 98 %    07/28/20 1328 94/52  79 20 96 %    07/28/20 1327   80 20 98 %    07/28/20 1325 97/56  79 20 98 %    07/28/20 1322 (!) 71/48  82 20 97 %    07/28/20 1319 (!) 73/47  86 20 93 %    07/28/20 1316 94/55  92 20 95 %    07/28/20 1314 130/71  (!) 102 20 (!) 81 %    07/28/20 1310 125/67  (!) 110 20 (!) 84 %    07/28/20 1305 154/74  (!) 112 20 91 %    07/28/20 1259 135/64  (!) 111 20 91 %    07/28/20 1254 146/75  (!) 111 20 (!) 89 %    07/28/20 1249 138/74  (!) 110 22 91 %    07/28/20 1244 145/80  (!) 108 20 92 %    07/28/20 1242 147/74 98.5 °F (36.9 °C) (!) 106 14 91 %      Labs:    Recent Labs     07/29/20  0309  07/26/20 1949   WBC 11.2*   < >  --    HGB 8.1*   < >  --       < >  --       < >  --    K 4.3   < >  --    *   < >  --    CO2 22   < >  --    BUN 29*   < >  --    CREA 0.87   < >  --    GLU 93   < >  --    TBILI 0.2   < >  --    ALT 71*   < >  --    *   < >  --    NH4  --   --  20    < > = values in this interval not displayed.          Bib Dumont, NP

## 2020-07-29 NOTE — PROGRESS NOTES
Ventilator check complete; patient has a #7.5 ET tube secured at the 27 at the lip. Patient is sedated. Patient is able to follow commands. Breath sounds are diminished. Trachea is midline, Negative for subcutaneous air, and chest excursion is symmetric. Patient is also Negative for cyanosis. All alarms are set and audible. Resuscitation bag is at the head of the bed. Ventilator Settings  Mode FIO2 Rate Tidal Volume Pressure PEEP I:E Ratio   Pressure support  30 %      8 cm H2O  8 cm H20        Peak airway pressure: 16 cm H2O   Minute ventilation: 8.09 l/min     ABG: No results for input(s): PH, PCO2, PO2, HCO3 in the last 72 hours.       Javan Anderson, RT

## 2020-07-29 NOTE — ROUTINE PROCESS
Respiratory Care Services Policy Number: [id number] Title: Oxygen Protocol Effective Date: 01/1996 Revised Date: 06/2013, 02/29/2016, 4/2018, 7/2019 Reviewed Date: 05/2014, 03/2015, 06/2017 I. Policy: The Oxygen Protocol will be initiated for all patients upon written order from physician for administration of oxygen therapy or if a patient is found to have an oxygen saturation of 88% or less. Special consideration: the goal of oxygen therapy for COPD patients is to maintain oxygen saturation between 88% - 92% to comply with GOLD Guidelines. II. Purpose: To provide protocol driven respiratory therapy for the administration of oxygen at concentrations greater than that in ambient air with the intent of treating or preventing the symptoms and manifestations of hypoxia. III. Responsibility: Director Respiratory Care Services, all Respiratory Care Practitioners IV. Indications: A. Implement this protocol for patients when physician orders oxygen to be administered or when patient is found to have an oxygen saturation of 88% or less. B. To assure routine monitoring of patient's oxygen saturation b.i.d. and to make appropriate adjustments in accordance with ordered oxygen saturation parameters. C. To assure continuity of respiratory care that meets [facility] Clinical Practice Guidelines and GOLD Guidelines. D. Hb < 8 
E. Sickle Cell anemia crisis V. Assessment:  Assess the following parameters to determine the need to adjust oxygen: A. Measurement of patient's oxygen saturation via pulse oximetry. B. Observation of patient's color, respiratory effort, and responsiveness. C. Measurement of heart rate and respiratory rate. D. Complete a three-step ambulatory oxygen saturation when ordered. VI. Initiation:  Upon receipt of an order for oxygen, the RCP will: A. Verify order in the patient's EMR, which should include the desired oxygen saturation to be maintained. B. The patient shall be placed on oxygen with humidity (except for those oxygen delivery devices that do not require humidity, i.e. venturi masks and non-rebreather masks) as ordered by the physician to achieve the prescribed oxygen saturation. C. In the event that no saturation is specified, a saturation of 90% will be maintained. D. Patients, who are found to have a SaO2 of 88% or less, may be started on supplemental oxygen as described above. E. Patients admitted with cardiac problems/disease shall be maintained at 92% per Chest Committee recommendation. F. The patient will be informed of the \"no smoking policy\" and instructed in the proper use of oxygen therapy. G. Once desired oxygen saturation has been achieved, the RCP will document FIO2 and oxygen saturation in the respiratory section of the patient's EMR. VII. Maintenance: A. 30-second oxygen saturation check will be taken to maintain the saturation ordered by the physician each day. B. Patients will be assessed each shift and as needed by pulse oximetry to determine if oxygen needs to be decreased, increased or discontinued. C. If changes in FIO2 are indicated, all changes will be documented in the respiratory section of the patient's EMR. D. If no changes in FIO2 are required, the patient's oxygen flow rate and saturation will be recorded in the respiratory section of the patient's EMR. E. Per [facility], patients who are receiving oxygen therapy but are not on oxygen at home, should be weaned off oxygen as soon as possible or when anticipated discharge becomes evident. Bailey Monas will be discontinued after oxygen saturation has been maintained for 24 hours on room air and documented in the patient's EMR. G. Patients on the Inpatient Rehabilitation area on 9th floor will be exempt from having their oxygen discontinued per protocol.  Oxygen may be weaned but will be changed to prn to meet the needs of the patient when exercising and participating in therapy. H. The goal of oxygen therapy is to maintain patients with a diagnosis of COPD at oxygen saturation between 88% - 92% to comply with GOLD Guidelines. VIII. Safety: RCP will address the following safety issues: A. Identify patient using the two patient identifiers name and birth date via ID bracelet. B. Perform hand hygiene per hospital policy utilizing Standard Precautions for all patients and following transmission-based isolation as indicated per hospital policy. C. Cardiac patients will be maintained at an oxygen saturation of 92%. D. If a patient's FIO2 requirements necessitate changing oxygen delivery devices to a high concentration of oxygen, documentation indicating the change must be included in the progress notes, as well as in the respiratory flowsheet. E. If a patient has a hemoglobin level <8 mg. RCP will consult physician before discontinuing oxygen. IX. Interventions: A. RCP will assess patient for signs of respiratory distress or suspicion of CO2 retention. B. An ABG may be obtained for patients exhibiting respiratory distress or sickle cell      crisis. C. An order should be entered into patient's EMR for ABG under per protocol. X. Documentation A. Document assessment findings in the respiratory section of the patient's EMR. B. Document changes in therapy per protocol in the respiratory orders section and in the care plan section of the patient's EMR. C. Document patient education in the patient education section of the patient's EMR. XI. Reportable Conditions:  Report to the physician immediately: A. Acute changes in patient's respiratory status. B. An oxygen saturation <85%. C. A change in oxygen delivery device to provide a high concentration of oxygen. XII. Patient Instructions: Review with Patient A. Purpose of oxygen therapy B. Proper technique for using oxygen C. No smoking policy Approval: Pulmonary Committee (1-25-96) Revision: Chest Committee (4-28-05) L - Respiratory Care Department Policy, Procedure and Protocol Guideline Manual, Miguel, SALLIE Cooley. L - Therapist Driven Respiratory Care Protocols  A Practitioner's Guide for Criteria-Based Respiratory Care by Jayden Abreu M.D., and SALLIE Vasquez RRT. L - The rationale for therapist-driven protocols: an update. Respiratory Care [facility]. [facility] Clinical Practice Guidelines. Respiratory Care Services Policy Number: [id number] Title: Patient Care Assessment Protocol Effective Date: 01/1999 Revised Date: 05/2014, 04/2018, 12/2018, 07/2019 Reviewed Date: 06/2013/ 03/2015, 03/2016, 06/2017 Overview In an effort to improve quality and reduce costs of respiratory care at [facility], the Respiratory Department has developed a number of Patient Care Protocols. These protocols have been developed according to Zbigniew 3 and are utilized for those patients who are ordered respiratory therapy using therapeutic indications and standardized approaches for accomplishing objectives. Patient Care Protocols are intended to improve care by: ? Defining the indications and standards of care agreed upon by the Pulmonary Medicine and [facility]. ? Training respiratory care practitioners to apply those criteria to individual patients and modify therapy as indicated by the protocols. ? Documenting the indication and care plan as part of the initial ordering process. ? Tapering or discontinuing treatments once the indication for therapy changes. The Patient Care Protocols shall be universally applied throughout the hospital as determined by  
the Pulmonary Medicine and [facility] [address] Rationale for Patient Care Assessment Protocols: 
? Continuous Quality Improvement ? Cost containment ? Standardization of care 
? Enhanced continuity of care ? Utilization review ? Timely intervention The following patient care assessment protocols have been developed: ? Aerosolized Medication Protocol http://[facility]/stfrancis/policies/Respiratory%20Care%20Services%20Policies/-[id number]. doc ? Bronchial Hygiene Protocol http://spb/[facility]/[facility]/stfrancis/policies/Respiratory%20Care%20Services%20Policies/-[id number]. doc 
? Oxygen Protocolhttp://spb/[facility]/stfrancis/policies/Respiratory Care Services Policies/-[id number]. doc http://spb/[facility]/stfrancis/policies/Respiratory%20Care%20Services%20Policies/-[id number]. doc 
? CVRU Fast Track Weaning Protocol http://spb/[facility]/stfrancis/policies/Respiratory%20Care%20Services%20Policies/-[id number]. doc ? Asthma Treatment Protocol ERhttp://spb/[facility]/stfrancis/policies/Respiratory Care Services Policies/-[id number]. doc http://spb/[facility]/stfrancis/policies/Respiratory%20Care%20Services%20Policies/-[id number]. doc ? Pediatric Asthma Treatment Protocol ERhttp://spb/[facility]/stfrancis/policies/Respiratory Care Services Policies/-[id number]. doc http://spb/[facility]/stfrancis/policies/Respiratory%20Care%20Services%20Policies/-[id number]. doc ? Alpha-1 Antitrypsin Deficiency Protocolhttp://spb/[facility]quastems/gvl/stfrancis/policies/Respiratory Care Services Policies/-[id number]. doc http://[facility]/stancis/policies/Respiratory%20Care%20Services%20Policies/-[id number]. doc ? Prone Positioning Protocol http://[facility]/policies/Respiratory%20Care%20Services%20Policies/-[id number]. doc 
? COPD Protocol http://[facility]/policies/Respiratory%20Care%20Services%20Policies/-[id number]. doc 
?  Home Oxygen Assessment Protocolhttp://[facility]/stfrancis/policies/Respiratory Care Services Policies/-[id number]. doc http://spb/[facility]/stfrancis/policies/Respiratory%20Care%20Services%20Policies/-[id number]. doc 
? Ventilator Weaning Protocol http://spb/[facility]/stfrancis/policies/Respiratory%20Care%20Services%20Policies/-[id number]. doc ? Lung Volume Expansion Protocolhttp://spb/[facility]/stfrancis/policies/Respiratory Care Services Policies/-[id number]. doc http://[facility]/stfrancis/policies/Respiratory%20Care%20Services%20Policies/-[id number]. docm The Director of [address] [place] [address] oversees the Patient Care Assessment Program. The Respiratory Educator is responsible for protocol development and training. The Supervisor is responsible for implementation and  activities. Each patient with an order for respiratory treatments will receive an evaluation. Respiratory Care Practitioners (RCP's) will perform the evaluations. The same evaluation tool will be utilized for initial and follow-up assessments. If the patient does not meet criteria for ordered therapy, the therapy will be discontinued. If the patient demonstrates an adverse response to initially ordered therapy, the therapy will be discontinued and the physician will be contacted. Specific physician's orders that deviate from protocols and are deemed \"inappropriate\" or \"unsafe\" will be addressed with ordering physician and/or medical director as required. Respiratory Patient Care Assessment Protocols I. Policy:   In an effort to provide quality patient care and effective utilization of services, physicians who order respiratory therapy will have their patients treated via the protocols established (see attached) Respiratory Care Practitioners (RCP's) will complete the initial assessment which will indicate patient needs,  the care plan developed and will performed within 24 hours of admission. Frequency of the therapy will be set according to the results of the respiratory therapy evaluation and frequency guidelines policy. Reassessment will be continued every 48 hours and more frequently as needed for the individual patient. II. Purpose: F. To provide a process that will allow for ongoing assessment and care plan modification for patients receiving respiratory services based on both objective and or subjective patient responses to interventions. This process of protocol utilization will assist in patient care progression while eliminating the need for the physician to continually update respiratory therapy orders. G. To assure continuity of respiratory care that meets [facility] Clinical Practice Guidelines. III. Initiation:  Implement Respiratory Care Protocols for patients who are ordered by physician  
       to receive respiratory therapy procedures or for ventilator management. IV. Protocol: 
E. Upon receiving an order for therapy the RCP will review the patient's EMR (electronic medical record) for all pertinent information includin. Physician's order for therapy 2. Patient history and physical examination 3. Physician progress notes 4. Diagnostic. X-rays, PFT's, arterial blood gases etc. 
F. The RCP will perform a respiratory assessment in the following manner: 1. General observations: color, pattern and effort of breathing, chest expansion, (symmetrical and bilateral), level of consciousness and the ability to ambulate. 2. The RCP will assess patient's cough ability and determine if bronchial hygiene is needed. If patient is unable to produce sputum, at that time, the RCP should question the patient with regard to their sputum: production, color consistency, frequency and amount.  
3. Auscultation: Using a stethoscope, the RCP will listen and note quality of breath sounds and presence or absence of adventitious breath sounds in all lung fields, both anteriorly and posteriorly. 4. Upon completing the EMR review and physical assessment, the RCP will document findings in the RT Assessment section of the EMR. The score level will be provided and will be used to determine the frequency of therapy. V. Indications: A. Bronchial Hygiene Protocol indications: 1. Potential for or presence of atelectasis. 1. Need for hydration and removal of retained secretions. 1. Need for improvement of cough effectiveness. 1. Presence of conditions associated with disorder of pulmonary clearance: 
a. Cystic fibrosis b. Bronchiectasis 
c. Neuromuscular disease 
d. Obstructive lung diseases 
e. Restrictive lung diseases Aerosolized Medication(s) Protocol indications:Treatment of bronchospasm/wheezing 2. Improvement of mucociliary clearance 3. Treatment of stridor 4. History of Bronchiectasis, Asthma or COPD 
C. Oxygen Therapy Protocol indications: 1. Documented hypoxemia 2. Severe trauma 3. Acute myocardial infarction 4. Short-term therapy (e.g. post anesthesia recovery) D. CVRU Ventilator Weaning Protocol indications: 1. All mechanically ventilated surgical patients unless they have a no wean order. E. Asthma Treatment Protocol ER indications: 1. Patients 15years of age and older that have been triaged or diagnosed with   asthma exacerbation shall be indicated for the ER Asthma Treatment Protocol. A physician order will be required to initiate the protocol. F. Pediatric Asthma protocol in the ER indications: 1. Patients less than 15years old that have been triaged or diagnosed with asthma exacerbation shall be indicated for the Pediatric Asthma protocol. A physician order will be required to initiate the protocol. G. Alpha-1 Antitrypsin Deficiency Testing protocol indications: 1. Patients admitted and diagnosed with COPD. H. Prone Positioning Protocol indications: 
       1. Acute lung injury 2. Acute respiratory distress syndrome (ARDS) I. Respiratory Care COPD Protocol indications: 1. History of COPD in patient's records 2. Smoking history J. Home Oxygen Assessment Protocol indications: 1. Chronic lung disease 2. Cor pulmonale 3. Unable to wean to room air 48 hours prior to anticipated discharge. K.  Ventilator Weaning Protocol indications: 1. Patient's mechanically ventilated 2. Managed by intensivist 
L. Lung Volume Expansion Protocol indications: 
      1. Any patient at risk for pulmonary complications. VI. Maintenance: H. Timely patient assessment is an integral part of this protocol therefore the following will be applied: 1. All non- critical care patients will be evaluated upon receiving initial respiratory care orders within 24 hours and re-evaluated within 48 hours (or more as needed). 2. Orders requesting a Respiratory Consult will be responded to in the following manner: 
a. In patient emergency situations, the RCP assigned to the floor will respond immediately to the patient, provide an initial respiratory assessment, and contact the patient's physician as necessary for appropriate orders. b. In non-emergent situations, the RCP assigned to the floor will respond to the patient within 90 minutes and provide an initial respiratory assessment and contact patient's physician as necessary for appropriate orders. c. An RCP will provide a comprehensive assessment as soon as possible. 3. Upon completion of an evaluation, the RCP will complete documentation in the patient's EMR in the RT Assessment section. 4. The RCP who completes the assessment will document orders for therapy in the orders section of the patient's EMR selecting new order. Next, per protocol should be selected indicating it is a protocol order and sign orders should be selected to complete the process.  The applicable protocol must be added to the progress note per Joint Commission guidelines. 5. The Pharmacy and Therapeutics (P&T) Committee has mandated that the medication Xopenex may be changed to unit dose albuterol without an order, except for those patients receiving Xopenex due to cardiac arrhythmias. 1. The dosage for these patients should be 0.63 mg. and may be changed from 1.25 mg. to 0.63 mg per P & T Committee by the RCP completing the assessment. 6. Patients who are not experiencing cardiac arrhythmias, and are ordered Xopenex and Atrovent may be changed to Duoneb. VII. Safety:       
I. The following safety issues shall be monitored: 1. The RCP will perform hand hygiene per hospital policy utilizing Standard Precautions for all patients and following transmission-based isolation as indicated per hospital policy. 2. The RCP must exercise professional judgment in classifying the patient for frequency of therapy. 3. Appropriate classification of the patient will require an evaluation utilizing the Therapy Assessment Protocol Guidelines. 4. The RCP will confer with the physician concerning the care of the patient at any time questions or problems arise. 5. If during therapy, the patient exhibits no improvement, or deterioration in clinical status the RCP will notify the physician and the patient's nurse. VIII. Interventions:  
F. The patient's nurse is responsible concerning all items related to his/her care. Ongoing communication with nursing is essential to successful protocol management. G. The RCP recognizes the value of the team approach in meeting the patient's needs. Nursing input regarding the patient's pulmonary condition will be sought as needed. IX. Reportable conditions: The RCP will inform the physician if: 1. There are acute changes in patient's respiratory status. 2. The therapist is unable to determine appropriate care plan upon assessment. 3. The patient fails to reach therapeutic objective. 4. A change or additional medication is needed. X.  Patient Education:   
D. Patient will receive instruction on the followin. The treatment modality, including objectives and proper technique of therapy 2. Respiratory medications E. Documentation shall occur in the patient education section of the patient's EMR. XI. Documentation: Record all findings as described above in the patient's EMR. Related Protocols: A. Aerosolized Medication Protocol B. Bronchial Hygiene C. Oxygen Protocol D. [facility] Fast [facility] Weaning Protocol E. Asthma Treatment protocol ER 
F. Alpha-1 antitrypsin Deficiency Protocol G. Prone Positioning Protocol H. Respiratory Care COPD Protocol I. Home Oxygen assessment Protocol J. Ventilator Weaning Protocols K. Volume Expansion protocol Indications      Frequency          Level A. Aerosol therapy 1.  q4h     Severe SOB, wheezing, unable to sleep 1 2.  qid, q4 wa or q6h   Moderate SOB, wheezing   2 3.  tid      Hx of asthma, or COPD mild wheezing,  
      or facilitate secretion removal              3 
 4.  bid      Asthma, or COPD, Intermittent wheezing 4 5. PRN, i.e. tid PRN, qid PRN Asthma, or COPD, occasional wheezing 5 B. Bronchopulmonary Hygiene 1. q4h             Copious secretions, SOB, unable to sleep 1 2. qid & PRN            Moderate amounts of secretions   2 3. tid           Small amounts of secretions and poor cough,   
           history of secretions    3 4. PRN, i.e. tid PRN, qid PRN     Breathing exercises, encourage cough only 4  
  
C. Oxygen Therapy     Follow hospital approved Oxygen Protocol Note: 
qid treatments are due 0800, 1200, 1600, and 2000. tid treatments are due 0800, 1400, and 2000 Q6h treatments are due 0800, 1400, 2000, 0200 Q4 wa teatments are due 0800, 1200, 1600, and 2000. Q4h treatments are due  0800, 1200, 1600, 2000, 0000, and 0400. The Level 1-5 rating system is only to be used as criteria for determining appropriate frequency of therapy. References:  
[address] Standard L    Respiratory Care Department Policy, Procedure and Protocol Guideline Manual, 1995, SALLIE DOMINGUEZ  Therapist Driven Respiratory Care Protocols  A Practitioner's Guide for Criteria-Based Respiratory Care by Yung Zuniga M.D., and AB Hung  The rationale for therapist-driven protocols: an update. Respiratory Care 1998; 77:652-585 L Therapist Driven Respiratory Care Protocols  A Practitioner's Guide for Criteria-Based Respiratory Care by Yung Zuniga M.D., and AB Hung The rationale for therapist-driven protocols: an update. Respiratory Care 1998; [id number]. N   [facility] Clinical Practice Guidelines. D. The RCP will perform a respiratory assessment in the following manner: 1. Perform hand hygiene per hospital policy utilizing Standard Precautions for all patients and following transmission-based isolation as indicated per policy. 2. Identify patient via ID bracelet verifying patient name and birth date. 3. General observations: color, pattern and effort of breathing, chest expansion, (symmetrical and bilateral), level of consciousness and the ability to ambulate. 4. The RCP will assess patients cough ability and determine if Nasotracheal suctioning is needed. If patient is unable to produce sputum, at that time, the RCP should question the patient with regard to their sputum: production, color consistency, frequency and amount. 5. Auscultation: Using a stethoscope, the RCP will listen and note quality of breath sounds and presence or absence of adventitious breath sounds in all lung fields, both anteriorly and posteriorly. 6. Upon completing the EMR review and physical assessment, the RCP will document findings in the RT Assessment section of the EMR.  The score level will be provided and will be used to determine the frequency of therapy. V. Indications: A. Indications for Bronchial Hygiene Protocol will include: 1. Potential for or presence of atelectasis. 2. Need for hydration and removal of retained secretions. 3. Need for improvement of cough effectiveness. 4. Presence of conditions associated with disorder of pulmonary clearance: 
a. Cystic fibrosis b. Bronchiectasis B. Indications for Aerosolized Medication(s) Protocol should include: 1. Treatment of bronchospasm/wheezing 2. Improvement of mucociliary clearance 3. Treatment of stridor 4. History of Asthma or COPD 
           C.  Indications for Oxygen Therapy Protocol should include: 1. Documented hypoxemia 2. Severe trauma 3. Acute myocardial infarction 4. Short-term therapy (e.g. post anesthesia recovery) VI. Maintenance:   
D. Timely patient assessment is an integral part of this protocol therefore the following will be applied: 1. All non- critical care patients will be evaluated upon receiving initial respiratory care orders within 24 hours and re-evaluated within 48 hours (or more as needed). 2. Orders requesting a Respiratory Consult will be responded to in the following manner: 
a. In patient emergency situations, the RCP assigned to the floor will respond immediately to the patient, provide an initial respiratory assessment, and contact the patients physician as necessary for appropriate orders. b. In non-emergent situations, the RCP assigned to the floor will respond to the patient within 90 minutes and provide an initial respiratory assessment and contact patients physician as necessary for appropriate orders. c. An RCP will provide a comprehensive assessment as soon as possible. 3. Upon completion of an evaluation, the RCP will complete documentation in the patients EMR in the RT Assessment section.  
4. The RCP who completes the assessment will document orders for therapy in the orders section of the patients EMR selecting new order. Next, per protocol should be selected indicating it is a protocol order and sign orders should be selected to complete the process. 5. The Pharmacy and Therapeutics (P&T) Committee has mandated that the medication Xopenex may be changed to unit dose albuterol without an order, except for those patients receiving Xopenex due to cardiac arrhythmias. The dosage for these patients should be 0.63 mg. and may be changed from 1.25 mg. to 0.63 mg per P & T Committee by the RCP completing the assessment. 6. Patients who are not experiencing cardiac arrhythmias, and are ordered Xopenex and Atrovent may be changed to Duoneb. VII. Safety: A. The following safety issues shall be monitored: 1. The RCP will perform hand hygiene per hospital policy utilizing Standard Precautions for all patients and following transmission-based isolation as indicated per hospital policy. 2. The RCP must exercise professional judgment in classifying the patient for frequency of therapy. 3. Appropriate classification of the patient will require an evaluation utilizing the Therapy Assessment Protocol Guidelines. 4. The RCP will confer with the physician concerning the care of the patient at any time questions or problems arise. 5. If during therapy, the patient exhibits no improvement or deterioration in clinical status the RCP will notify the physician and the patients nurse. VIII. Interventions: A. The patients nurse is responsible concerning all items related to his/her care. Ongoing communication with nursing is essential to successful protocol management. B. The RCP recognizes the value of the team approach in meeting the patients needs. Nursing input regarding the patients pulmonary condition will be sought as needed. IX. Reportable conditions: The RCP will inform the physician if: 1. There are acute changes in patients respiratory status. 2. The therapist is unable to determine appropriate care plan upon assessment. 3. The patient fails to reach therapeutic objective. 4. A change or additional medication is needed. X.  Patient Education: A. Patient will receive instruction on the followin. The treatment modality, including objectives and proper technique of therapy 2. Respiratory medications B. Documentation shall occur in the patient education section of the patients EMR. XI. Documentation: Record all findings as described above in the patients EMR. Related Protocols: A. Aerosolized Medication Protocol B. Bronchial Hygiene C. Oxygen Protocol D. Volume Expansion/Secretion Clearance E. Ventilator Weaning Protocols References: 
[address] Standard L    Respiratory Care Department Policy, Procedure and Protocol Guideline Manual, , SALLIE DOMINGUEZ  Therapist Driven Respiratory Care Protocols  A Practitioners Guide for Criteria-Based Respiratory Care by Karly Simmons M.D., and SALLIE Fair RRT. L  The rationale for therapist-driven protocols: an update. Respiratory Care 1998; 54:083-057 N   [facility] Clinical Practice Guidelines. Respiratory Care Services Policy Number: -[id number] Title: Aerosolized Medication Protocol Effective Date: 10/1998 Revised Date: , , ,  Reviewed Date: / 03/15 , ,  I. Policy: The Aerosolized Medication Protocol shall by implemented by Respiratory Care Practitioners (RCP) for patients with orders to receive aerosol therapy with medication. II. Purpose: To open and maintain obstructed airways, the RCP, will utilize the following  
protocol to select the indicated aerosolized medication(s) and determine the most effective method of delivery to the patient. III. Patient Type:  All patients who are determined to meet aerosolized medication criteria as  
       outlined in this protocol. IV. Responsibility: Director, [address], registered Respiratory Care Practitioners (RCP's) with documented competency in the performance of respiratory therapeutic techniques. V. Equipment needed: Dorethea Mantel I. Pulse oximeter J. AeroEclipse nebulizer K. Dry Powder Inhaler (DPI) VI. Protocol:  
G. The following conditions are accepted indications for aerosolized medication therapy. 5. Bronchospasm/wheezing 6. Impaired mucociliary clearance 7. Tracheobronchial mucosal congestion/and laryngeal stridor 8. Diseases which commonly require aerosolized medication therapy include, but are not limited to: 
f. Asthma/reactive airway disease 
g. Bronchitis/emphysema (COPD) h. Cystic fibrosis 
i. Severe laryngitis/tracheitis 
j. Bronchiectasis 
k. Smoke inhalation or chemical trauma to the lung or upper airway 
l. Physical trauma to the upper airway 
m. Laryngotracheobronchitis 
n. Bronchiolitis 
o. Non-specific wheezing B. Indications for bronchodilator medications will include: 
d. Bronchospasm/ wheezing 
e. Asthma/reactive airway disease 
f. Chronic obstructive pulmonary disease 
g. Obstructive defect on pulmonary function testing C. Administration of medications 5. If a bronchodilator or any other type of respiratory medication is needed, a physician order must be indicated in the medication section in the patient's EMR. 6. When the physician specifies the medication and dosage at the time of request, the ordered medication will be used as part of the care plan. D. The following guidelines will be utilized in the evaluation and selection of the appropriate delivery device for indicated medication(s): 
1. Unassisted aerosol (UA) is the preferred method of aerosol delivery and indicated if 
c. Ventilation is inadequate 
d. Patient demonstrates wheezing e. Routine treatments shall be given via the AeroEclipse nebulizer. f. The Aerogen nebulizer shall be used in the following circumstances: 
i. ER patients and they will continue with this nebulizer if admitted to 8th floor or ICU. 
ii. Patients in ICU  
iii. Patients on 8th floor with severe wheezing (at the RCP's discretion) 2. Dry PowderInhaler (DPI)  
d. Patient should be alert/cooperative 
e. Able to perform 3 second breath hold. f. Patient has used DPI therapy previously, either at home or in the hospital. 
g. Note: The only approved inhaler on formulary is Spiriva. VII. Guidelines:  
Monitor patient's vital signs and evaluate patient's clinical status. The need to change medication and/or modality may be indicated by: 5. A pulse greater than 120 bpm, or if a pulse increase of 20 bpm occurs with bronchodilator medications. 6. Significant worsening of dyspnea or wheezing occurring during or within 30 minutes of discontinuing therapy. 7. Worsening of patient's sensorium (e.g. patient becomes confused or obtunded, and unable to follow directions). 8. Worsening of patient's chest x-ray. 9. Change in sputum (e.g. increased pulmonary infiltrate, which might indicate need for volume expansion therapy). 10. Patient has difficulty coughing up secretions, which might indicate need for acetylcysteine and/or bronchial hygiene therapy. 11. Call physician immediately if dyspnea worsens and is not responsive to modifications allowed by protocol. VIII. Clinical Responsibility: 
2. The therapy assessment guidelines will be used to evaluate all patients receiving aerosolized medications with the exception of critical care areas. 5. RCP's will perform changes in therapy per protocol. 6. It will be the responsibility of RCP to provide instruction regarding respiratory medications, possible side effects, aerosol therapy and proper DPI technique, as well as, spacer usage to patients ordered DPI therapy. 7. Current therapy that is part of a patient's home regimen will not be discontinued. a. Provide spacer and educate patient on proper inhaler technique if needed. IX. Documentation D. Document assessment findings in the respiratory assessment section of the patient's EMR. E. Document changes in therapy per protocol in the respiratory orders section and in the care plan section of the patient's EMR. F. Document patient education in the patient education section of the patient's EMR. X. Outcome Criteria: 
I. Relief of wheezes and obstruction J. Improved cough and sputum color and consistency K. Improved chest x-ray L. Improved arterial oxygen tension and or SaO2 M. Improved Peak Flow on asthmatic patients XI. Related Protocols: J. Respiratory Patient Care Protocols K. Bronchial Hygiene Therapy L. Oxygen Protocol Reference: L - Respiratory Care Department Policy, Procedure and Protocol Guideline Manual, 1995, SALLIE Cooley. L -  Therapist Driven Respiratory Care Protocols  A Practitioner's Guide for Criteria-Based Respiratory Care by Jannet Daniel M.D., and SALLIE Uribe, RRT. L - The rationale for therapist-driven protocols: an update. Respiratory Care 1998; [id number].  -[facility] Clinical Practice Guidelines. Respiratory Care Services Policy Number: -[id number] Title: Bronchial Hygiene Protocol Effective Date: 01/1999 Revised Date: 12/2014, 11/2017, 7/2019 Reviewed Date: 06/2013, 05/2014, 03/2015, 03/2016, 06/2017, 4/2018 I. Purpose: The Respiratory Care Practitioner (RCP) will utilize the following protocol to select and initiate bronchial hygiene therapy to open and maintain obstructed airways when indicated. II. Patients: All patients who are ordered bronchial hygiene therapy. III. Clinical Area: All general patient floors IV. Protocol: The following conditions or diseases are indications for bronchial hygiene therapy. L. Oscillating PEP Therapy Indications should include:  
9. Atelectasis caused by mucus plugging or foreign body 10. Chronic mucociliary clearance disorders 11. Retained secretions which may be associated with the following conditions: 
p. Bronchitis 
q. Bronchiectasis 
r. Pneumonia M. PAP- Positive airway pressure therapy Indications include: 7. Patients with post-operative atelectasis or to prevent post operative atelectasis. 8. Patients who cannot perform deep breathing exercises due to pain. 9. Patients requiring lung expansion therapy who cannot follow instructions. 10. Patients requiring lung expansion therapy with poor inspiratory capacity <10cc/kg. 11. Patients requiring aerosol therapy in conjunction with opening their airways. N. Vibratory / Acoustical Airway Clearance Therapy (ACT)- i.e. (Vibralung, Vest, or Percussor) Indications should include 12. Patient conditions  that involve retained secretions, increased mucus production and defective mucociliary clearance such as: 
h. Cystic fibrosis 
i. Chronic bronchitis 
j. Bronchiectasis 
k. Pneumonia 
l. Asthma 
m. Muscular dystrophy 
n. Post-operative atelectasis 
o. Neuromuscular respiratory impairments 
p. ACT may be considered in patients with COPD with 
symptomatic secretion retention, guided by patient preference, 
toleration, and effectiveness of therapy Douglas Anaya et al., 2013). O. Nasotracheal suctioning indications should include: 
8. Inability to cough effectively 9. Excessive secretions 10. Artificial airway V. Equipment: A. PEP therapy device B. Vest therapy equipment Belén WELDON NT suction equipment VI. Guidelines:   Monitor patient's vital signs and evaluate patient's clinical status. The need to  
                             change therapy modality may be indicated by: Chasity Pill in patient's sensorium (patient now confused or obtunded, and unable to follow directions). I. A significant deterioration is evident on patient's chest radiograph or increased sputum production. J. Increased thickening of secretions (e.g. mucolytic therapy may be indicated.) K. Development of wheezing L. Decrease in oxygen saturation M. Development of chest pain. VII. Clinical Responsibility: The Therapy Assessment Protocol guidelines will be used to  
             re-evaluate all patients on bronchial hygiene therapy (See Therapy Assessment Protocol). N. RCP's will perform changes in therapy according to protocol. Vikki Baca O. Bronchial hygiene therapy may be discontinued when goals of therapy are met, e.g., secretions easily expectorated for 48 hours, atelectasis is resolved, etc. 
P. PAP Therapy may be utilized in place of IPPB therapy per discretion of the RCP, as approved by the Pulmonary Medicine and [facility] [place]. VIII. Outcome Criteria:  Outcome criteria for bronchial hygiene therapy should include: M. Decrease in sputum production N. Improved breath sounds O. Improved arterial oxygen tension and/or SaO2 P. Improved chest X-ray Q. Subjective response to therapy IX. Documentation G. Document assessment findings in the respiratory assessment section of the patient's EMR. H. Document changes in therapy per protocol in the respiratory orders section and in the care plan section of the patient's EMR. I. Document patient education in the patient education section of the patient's EMR. X. Related Protocols: 3. Respiratory Patient Care Assessment Protocols 2. Aerosolized Medication Protocol 2. Oxygen Therapy Protocol Reference: L - Respiratory Care Department Policy, Procedure and Protocol Guideline Manual, Miguel, SALLIE Cooley. L - Therapist Driven Respiratory Care Protocols  A Practitioner's Guide for Criteria-Based Respiratory Care by Jayme Christensen M.D., and SALLIE Sanchez RRT. N  [facility] Clinical Practice Guidelines. Phoebe Oswald., General Green., Lam Longoria., Angela Benitez., Ann Wright., . . . RAGHU France (2013, December). [facility] Clinical Practice Guideline: Effectiveness of Nonpharmacologic Airway Clearance Therapies in Hospitalized Patients. Respiratory Care, 58(64), 0232-9336. Retrieved June 28, 2019 Guideline Guideline Number: -[id number] Title: Management of the Patient with Mechanical Ventilation (including weaning) and ABCDE Bundle Effective Date:  03/00 Revised Date: 02/09, 03/10, 7/12, 5/13,                                  10/13, 8/14 Reviewed Date: 07/2015, 04/2016, 06/2017 I. Policy:  Management of the patient requiring mechanical ventilation, including readiness to wean and weaning protocol. The information provided serves as a guideline for patient management. Included in this guidelines is the ABCDE Bundle to provide guidance to staff for evidence based management of pain, agitation/anxiety and delirium in the intensive care unit. The goals of critical care analgesia and sedation are to facilitate mechanical ventilation, to prevent patient and caregiver injury, and to avoid the psychological and physiologic consequences of inadequate treatment of pain, anxiety, agitation, and delirium by maintaining a light level of sedation. Pain occurs commonly in adult ICU patients, regardless of admitting diagnosis. Therefore, pain should be frequently assessed and analgesic medications titrated to prevent adverse effects associated with either inadequate or excessive analgesia. Once pain has been addressed, anxiolytic and/or antipsychotic medications can be utilized to treat unresolved agitation/anxiety and delirium, with the goal to prevent over- or under sedation by using the Albert Agitation Sedation Scale (RASS). Assertive management of these issues has been shown to reduce costs, improve ICU outcomes such as successful extubation and ICU length of stay, and allow for patients to participate in their own care. II. Purpose: The respiratory care practitioner and the critical care RN will utilize the following guideline to provide the most efficient and effective management of mechanical ventilators and weaning and extubation processes. Goals of Treatment: 1. Adequate management of patients pain and discomfort while maintaining a light level of                   sedation (RASS score of 0 to -1) 2. Both chronic and acute sources of pain should be identified and treated. 3. Sedative agents should be considered if patient still expresses discomfort and/or is not at RASS         goal of 0 to -1 despite adequate management of pain. 4. Patients requiring neuromuscular blockage must have continuous infusions of analgesic and              sedative agents. III. Responsibility: Director Respiratory Care Services and all Respiratory Care Practitioners  with documented competency as well as Critical Care RN staff. General Guidelines 1. Introduction to Ventilator Plan Phase 
a. Ventilator Management Phase, General Statement -  The plan should be initiated in patients who have a secure airway/require invasive mechanical ventilation (endotracheal or tracheostomy) only -   The provider determines the appropriate medications used for analgesia and agitation/anxiety along with the clinical pharmacist 
 
2. Monitoring Levels of Comfort 
a. Pain Assessment 
- Pain is monitored using the numerical scales - A pain assessment should be conducted, at a minimum, every 4 hours and as needed and per guidelines. - The level of pain should be determined as satisfactory by the patient based on patients baseline level of pain , considering any chronic pain that the patient may have. - If the patient is unable to communicate pain level, the nurse can assess for nonverbal indicators including facial grimacing, moaning, tachypnea, tachycardia, hypertension, diaphoresis, etc as a cue to begin further pain assessment. b.  Sedation Assessment - Sedation is monitored using the Albert Agitation Sedation Scale (RASS) Target RASS RASS Description +4 Combative, violent, danger to staff 
  
+3 Pulls or removes tubes(s) or catheters; aggressive +2 Frequent nonpurposeful movement, fights ventilator +1 Anxious, apprehensive, but not aggressive  
0 Alert and calm  
-1 Awakens to voice (eye opening/contact) > 10 sec  
-2 Light sedation, briefly awakens to voice (eye opening/contact) < 10 sec  
-3 Moderate sedation, movement or eye opening. No eye contact  
-4 Deep sedation, no response to voice, but movement or eye opening to physical stimulation  
-5 Unarousable, no response to voice or physical stimulation  
 
-  Goal RASS is 0 to -1, unless otherwise specified by providers order. 
- Nursing staff should conduct the RASS every 4 hours and as needed to maintain goal RASS of 0 to -1. 
- If RASS is outside of goal range, discuss treatment options with provider. 
- A RASS score of +2 to +4 requires further assessment by the nurse. Causes of agitation/anxiety that should be considered include: 
a. Pulmonary -   endotracheal tube malposition or patency, mode of ventilation, pneumothorax, hypoxemia, hypercarbia 
b. Metabolic  hypoglycemia, hyponatremia, acute renal or hepatic failure 
c. Emotional upset  with information and awareness of critical condition, prognosis, need for surgical or invasive procedures, other interventions or complications, family or personal stressors 
- C. Sedation Assessment while using Neuromusclar Blocking Agents 
- D. Delirium Assessment 
a. the ICU (CAM  ICU) 3. Analgesia The incidence of significant pain has been reported to be 50% or higher in both medical and surgical ICU patients. These patients also experience discomfort during routine/procedural ICU care and at rest.  However, patients may be unable to self-report their pain (either verbally or with other signs) because of an altered level of consciousness, the use of mechanical ventilation, or high doses of sedative agents or neuromuscular blocking agents. The short and long term consequences of unrelieved or inadequately treated pain are significant and include patient discomfort, decreased satisfaction with care by family and patient, delirium, agitation/anxiety, post traumatic stress disorder and depression. Therefore, routine assessment and treatment of pain should occur in all ICU patients. Causes and Treatment of Pain in the ICU 
a. Acute pain (post-operative, procedural pain, discomfort with usual ICU care or other acute episodes of pain-related to underlying disease) 1. Consider use of PCA for alert and oriented patients with pain needs not met by PRN dosing or opoids. 2. Preemptive analgesia should occur prior to chest tube removal, and should be considered for other procedural pain such as turning and repositioning, would drain removal, wound dressing change, tracheal suctioning, femoral catheter removal or place of central venous catheter. 3. Appropriate analgesic medications for preemptive analgesia are short acting intravenous (IV) agents (i.e. fentanyl, morphine, hydromorphone) a. Administration of analgesia before patient experiences noxious stimuli prevents amplification and hyperexcitability of the central nervous system. b. Analgesia for Mechanically Ventilated Patients: 
1. The approach to sedation and analgesia management for mechanically ventilated patients favors use of analgesia first sedation. The primary goal of this strategy is to address pain and discomfort first, and then if necessary, add anxiolytic agent. 2. Analgesia first sedation reduces dose escalation of medications, decreases the duration of mechanical ventilation and the incidence of VAP, improves the probability of successful extubation, and ultimately shortens ICU length of stay. 3. For pain management, analgesic medications are determined by the provider. Intermittent dosing of the analgesic should be attempted first.   
If the patient requires more than 3 doses within 1 hour then provider should be contacted to consider continuous infusion. 4.  Analgesic options for mechanically ventilated patients include: 
a. Fentanyl which is considered the drug of choice for patients requiring continuous infusion. b.Morphine may be considered for those patients without renal dysfunction who are hemodynamically stable and require intermittent pain medication. Continuous infusions of morphine may be used for patientl who are receiving comfort care as part of end of life care. c.Hydromorphone is reserved for patients who are refractory to fentanyl or morphine and is typically admininstered by intermittent dosing. 4.  Agitation/Anxiety Background Agitation and anxiety frequently occur in ICU patients. Anxiolytic/sedation agents may be indicated to help relieve discomfort, improve synchrony with mechanical ventilation, and decrease the overall work of breathing. Pain control alone may be sufficient to make patients comfortable enough to require no anxiolytic/sedative agent. In addition, non-pharmacologic interventions such as repositioning or verbal assurance may be helpful to comfort or redirect an agitated patient. If these methods are unsuccessful, then anxiolytic/sedative medications such as propofol, dexmedetomidine, or benzodiazepines can be used.  Selection of an anxiolytic should be based on the pharmacokinetic properties of the medication, patient specific characteristics, and sedation goal. However, nonbenzodiazepine sedatives (ie propofol or dexmedetomidine) may be preferable over benzodiazepines (ie midazolam or larazepam) due to more favorable outcomes such as delirum. Causes and Treatment of Agitation/Anxiety 
a. Possible underlying causes of agitation and anxiety include pain, delirium, hypoxemia, hypoglycemia, hypotension, or withdrawal from alcohol  and other drugs. b. Analgesia first sedation should be attempted initially to manage pain and provide sedation in appropriate patients. Analgesia alone may be adequate to reach RASS goal of 0 to -1. If patient remains agitated or anxious despite adequate analgesia (ie RASS +2 to +4) then anxiolytic/sedative should be considered. c. The choice of anxiolytic should be based on desired levels of sedation (ie light sedation or deep sedation) with preference for the use of nonbenzodiazepines such as propofol or dexmedetomidine if appropriate. While light sedation (ie RASS 0 to -1) is preferred for most patients, there are instances when deep sedation (ie RASS -4 to -5) is desired. For example, in the setting of ventilator dysynchrony due to ARDS or for patients receiving NMB agents. d. Medications to maintain light sedation (ie RASS 0 to -1) include 1. Propofol continuous infusion can be considered for hemodynamically stable (ie SBP = 100, MAP = 65 and/or not requiring vasopressor support) patients requiring light sedation. Propofol has a quick onset (1-2 minutes) and offset of action, making it a good agent to assess neurological status and facilitate liberation from the mechanical ventilator. 2.Dexmedetomidine continuous infusion is a good option for hemodynamically stable patients requiring light sedation as it allows for a more awake, interactive patient is associated with less delirium. It has an intermediate onset of action (5-10 min).   Therefore, abrupt titrations should be avoided, but use of prn haloperidol or benzodiazepine may be useful to manage agitation until the medication takes effect. 3. Antipsychotics are another option. In particular, haloperidol intermittently dosed may be useful for patients with symptoms of agitation/anxiety and delirium. 4.Benzodiazapines can also be considered for light sedation, but should be intermittently doses. Midazolam is an option for patients without renal dysfunction. It has a short onset of action (2-5 minutes) making it a good agent for acute agitation/anxiety, but short duration of action resulting in frequent dosing. Lorazepam is another option. It has a longer onset of action (15-20 minutes) in comparison to midazolam, but longer duration of action. e. Medications to maintain deep sedation (RASS -4 to -5) include: 
1) Propofol continuous infusion should be considered as a first line option for hemodynamically stable patients. 2)Benzodiazepines can be considered as second line options for deep sedation. Studies comparing these agents to other sedatives have shown that they lead to worse outcomes including delirium, oversedation, delayed extubation, and longer time to discharge. Midazolam is one option for patients without renal dysfunction and lorazepam is another options. If patient requires more than 3 doses within 1 hour then contact provider  to consider initiation of continuous infusion. 5.  Daily Sedation Awakening Trial (SAT) from IV Continuous Analgesia/Sedation 
a. Patients are to have daily awakening from sedation while on continuous IV analgesia and/or sedation in the ICU. Continuous analgesia infusions may be maintained only if needed for active pain and RASS is at goal 0 - -1. Unit guideline is for the SAT to occur following ICP rounds each morning.   
b. The sedation awakening trial (SAT) is done regardless if the patient meets criteria for spontaneous breathing trial (SBT).  
c. SAT safety screen is assessed and SAT should not be performed if sedation is being used for active seizures, alcohol withdrawal, hemodynamically unstable or requiring support of vasoactive medications , in conjunction with NMB agents, if ICP is greater than 
20mmHg or if sedation is being used to control ICP, patients RASS is +3 or +4 (very agitated or combative). Other exclusion criteria are:  if there is documentation of myocardial ischemia in the past 24 hours; or patient is receiving high frequency oscillator ventilation (HFOV) , if the patient has an open chest /abdomen or is receiving comfort care. d. Criteria for passing the SAT are the patient opened their eyes to verbal stimuli or tolerated sedative interruption without exhibiting failure criteria. 
e. Patients fail the SAT if the develop sustained anxiety, agitation, or pain; a respiratory rate of 35 per minutes for 5 minutes or longer, an SpO2 less than 88% for 5 minutes or longer; an acute cardiac dysrhythmia; two or more signs of respiratory distress including tachycardia, bradycardia; use of accessory muscles; diaphoresis; marked dyspnea; or myocardial ischemia. f. Respiratory therapy staff must verify with the nurse that continuous IV analgesia (unless being use  for active pain) and sedation (unless patient is receiving dexmedetomidine) is off prior to placing patient on SBT. g. DO NOT interrupt infusion of analgesia and sedation medications if patient is receiving neuromuscular blockade. 
h. Monitor level of wakefulness unless patient is awake and follows commands (RASS 0 to -1) or patient becomes uncomfortable or agitated (RASS +3 to +4) 
i. If agitation prevents successful awakening , administer bolus of analgesia and/or sedation then resume infusion of the medication at ½ previous dose and titrate as needed. 
j. If oversedation prevents successful awakening, hole infusion until at goal and resume ½ of prior infusion rate/dose if clinically indicated. 6. Delirium Background Delirium is characterized by the acute onset of cerebral dysfunction with a change or fluctuation baseline 
mental status, inattention, and either disorganized thinking or an altered level of consciousness. It affects up to 80% of mechanically ventilated adult ICU patients, and is associated with increased mortality,  
and treatment is important and may in turn allow for a patient to be conscious yet cooperative enough to participate  
in ventilator weaning trials and early mobilization efforts. Delirium can only be assessed in patients who are able to sufficiently interact and communicate with bedside 
clinicians (ie RASS -3 to +4). IV. Procedure: A. Assessment: The following criteria must be assessed prior to the initiation of weaning from mechanical ventilation. Note: The criteria are general guidelines and must be individualized for each patient. The patients primary nurse will be responsible, in coordination with the RT, the Spontaneous Awakening Trial). The RT will perform the SBT. B. Spontaneous Awakening Trials (SATs  also referred to as Sedation Vacation) and Spontaneous Breathing Trials (SBTs) performed to determine readiness to wean. 1. For patients who meet established criteria, such as those without active seizures, alcohol withdrawal and agitation, myocardial ischemia or those requiring cardiac support devices, without increased intracranial pressure and those not receiving neuromuscular blockade, the nurse will reduce the infusions of sedative by 50% of current used for sedation that was used to achieve a level of light sedation (Yusuf Score 2 or RASS score of 0 to -1) and evaluate patient response to reduction of sedation. Analgesics required for pain control are continued during the test.  Obtain MD order to cover no SAT for that time period if patient has any exclusion criteria as described above.  
 
2. Failure of the spontaneous awakening trial occurs when the patient shows symptoms such as increased agitation, anxiety, pain or signs of respiratory distress including respiratory rate >35/min or oxygen saturation <88% as well as development of acute cardiac arrhythmias. If these symptoms develop during the SAT, the nurse then restarts sedation at 75% of the previous dose and titrates the medications until the patient is comfortable and/or symptoms have abated. 3.  If the patient passes the SAT then the patient moves on to the Spontaneous Breathing Trials as performed by the RT. The SBT Safety Screen included the following:  No agitation, oxygen saturation > 88%, FIO2 < 50%, PEEP < 7.5 cm H20, no myocardial ischemia, no vasopressor use, and with inspiratory efforts. 4. Patients who pass the spontaneous awakening trial but fail the spontaneous breathing trial are placed back on full ventilator support and reassessed the next day. 5. Failure of the SBT (spontaneous breathing trail) includes any of the following:  Respiratory rate > 35/min, respiratory rate < 8/min, oxygen saturation < 88%, respiratory distress, mental status change, acute cardiac arrhythmia. 6. Extubation is considered for patients who tolerate the spontaneous awakening trial and pass the spontaneous breathing trial.   
C. Can the cause of respiratory failure be reversed (i.e. absence of high spinal cord injury or advanced ALS)? D. Is gas exchange adequate? 1. PaO2/FIO2 ratio > 150  200, 2. PEEP < 8 cm H20 
3. FIO2 < 50 
4. pH > 7.30 
5. Rapid shallow breathing index (f/VT) < 105 
E. Is patient hemodynamically stable? 1. Absence of clinically significant hypotension (minimal vasopressors such as Dopamine < 5mcg/kg/minute)? F. Is there evidence of intact respiratory drive (NIP/NIF >-06 [id number], stable VC02)? G. Does patient have an adequate cough, airway clearance ability? H. Is there absence of excessive secretions? V. Initiation: A. The therapist shall consult with RN to determine if sedation can be discontinued or significantly decreased. If this can be achieved, the therapist shall implement the  
                  followin. Identify patient and verify name and account number via ID bracelet. 2. Perform hand hygiene per hospital policy utilizing Standard Precautions for all patients and following transmission-based isolation as indicated per hospital policy. 3. Perform a ONE-MINUTE SPONTANEOUS TRIAL AND ASSESSMENT. 4. Measure Rapid Shallow Breathing Index (RSBI) and monitor SpO2 and cardiovascular parameters during the spontaneous breathing assessment. 5. If SpO2 and cardiovascular parameters are stable, continue spontaneous breathing trial for at least 30 minutes and up to 120 minutes, as patient tolerates. 6. Monitor ventilatory status, SpO2, and cardiovascular status during spontaneous breathing trial. 
7. If patient has a successful trial, consider patient as a candidate for extubation and obtain order. 8. If patient fails the weaning trial, place back on ventilator and adjust settings to provide a non-fatiguing form of ventilatory support for the remainder of the day and night. 9. One attempt at weaning shall be performed each day until successful weaning occurs. The RCP will make every attempt to begin the spontaneous breathing trials between 0500 and 0600 to provide documentation of the trial when the pulmonologist makes rounds. B.  Assessment of SBT or PST: 
1. Is gas exchange acceptable? 2. PaO2 > 60 mm Hg. 3. PH > 7.30 
4. Increase in PaCO2  < 10 mm Hg C. Is patient hemodynamically stable? 1. HR < 120 beats/minute 2. HR  < 20% 3. Systolic BP < 653 and > 90 mmHg 4. BP  < 20%, no vasopressors required D. Does patient have stable ventilatory pattern? 1. Sustained RR < 30 breaths per minute 2. Normal and stable VCO2 3.  Patient is not demonstrating any signs of increased work of breathing, such as increased use of accessory muscles. E. Mental status stable throughout trial? 
1. Absence of changes such as somnolence, excessive agitation or anxiety 2. Absence of diaphoresis during trial? 
IV. Safety: A. The RCP shall monitor patient according to the above guidelines. If at any time during the weaning process, the respiratory therapist or nurse feels that the patient is not tolerating weaning, the therapist shall place patient back on previous ventilator settings. B. The patient shall be reassessed and the weaning process should be continued the following day. V. Reportable Conditions: A. The therapist shall notify the physician, as appropriate, for any of the following         conditions: 1. FIO2 increase (sustained) at 10% or greater 2. Poor patient/ventilator interface in spite of adjustments 3. Need for increased sedation for respiratory distress 4. Need for increasing ventilating pressures (i.e. PEEP, PIP, MAP) 5. ABG results meeting panic value criteria or other clinical signs indicating deterioration of patients condition. 6. Unplanned extubation. 7. Unexplained sustained increase in PIP greater than 10 cm H2O. 
8. Assessment results regarding ventilator discontinuance process. VI. Ventilator protocol management A. The following items should be maintained for patients who are being mechanically           ventilated. 1. Obtain STAT Chest X-Ray for ET tube placement after insertion. 2. Chest X-Ray q a.m. while on ventilator. 3. ABGs 30 - 60 minutes after being stable on the ventilator. 4. ABG's q a.m. while on ventilator and prn. 
5. Do spontaneous breathing trials when patient is hemodynamically stable, responsive, and without fever. 6. Terminate trials if patient exhibits signs of respiratory distress. 7. Therapists should maintain ABG s as follows: 
    a. pH -  7.30 - 7.50              
    b. PaO2 -   60  100 8.    Racemic Epinephrine (0.5cc) for post extubation stridor (2 UA treatments max.) 
 
VII. Early Mobilization Mobility Level Criteria Start at Level 1 if:  
PaO2/FIO2 <250 Positive end-expiratory Pressure (PEEP) >=10 cm H2O  
O2 saturation <90% Respiratory Rate (RR) Not within 10-30 per min Cardiac arrhythmias or ischemia New onset Heart Rate  (HR) <60 or >120 beats per min Mean arterial pressure (MAP) <55 or >140 mmHg Systolic blood pressure (SBP) <90 or > 180 mmHg Vasopressor infusion New or increasing Albert Agitation Scale (RASS) < - 3 Level I:   Breathe  (Rass -5 to -3) HOB Angle  improve VAP protocol compliance ? Visually confirm the [facility] is elevated >= 30 degrees to comply with VAP prevention protocols ? The Centers for Disease Control and Prevention recommends an HOB angle of 30-45 degrees , unless contraindicated Additional activities to be implemented ? Every 2 hour turning ? Passive range of motion ? Up to 20 degrees reverse trendelenburg with lower extremity exercise/retracting footboard ? Continuous lateral rotation therapy can be considered part of early mobility therapy in patients who are at high risk for pulmonary complications Move to Level 2 when the patient 
- Has acceptable oxygenation/hemodynamics - Tolerates q 2 turning - Tolerates HOB > 30 degrees or up to 20 degrees reverse trendelenburg Level 2 :Tilt  Patient Assessment Rass > -3  (eg, opens eyes, may have profound weakness) Up to 20 degrees Reverse Trendelenburg position and 10 degrees minimum HOB 
- Reverse Trendelenburg positioning allows for orthostatic position in fragile patients - If available , use in conjunction with retracting foot section to allow for partial weight bearing prior to sitting up in the bed or getting out of bed Additional activities to be implemented -  Maintain HOB >/= 30 degrees - Q 2 hour turning - Passive/active range of motion - Legs dependent - PT consultation Move to Level 3 when the patient . Sarah Hitchcock -Tolerates active- assistance exercises 2 times per day 
  -Tolerates lower extremity exercises against footboard/Up to 20 degrees Reverse Trendelenburg 
  -Tolerates legs dependent /HOB 45 degrees Level 3 :  SIT  (Rass >- 1 (eg , weak but may move arms/legs independently) Full chair position (footboard on) ? Full upright positioning allows for diaphragmatic excursion and lung expansion ? Sitting with legs in a dependent position facilitates gas exchange Additional activities to be implemented - Maintain HOB >= 30 degrees - Q 2 hour turning (assisted) - Active range of motion  PT/OT actively involved - Encourage activities of daily living 
- Dangling, if patient can move arm against gravity Move to Level 4 when the patient . Sarah Hitchcock - Tolerates increasing active exercise in bed - Actively assists with every- 2- hour turning or turns independently - Tolerates full chair position 3 times/day Level 4:  Stand ( RASS >0 (eg, weak but may tolerate increased activity) Stand Attempts ? Full chair position (footboard off/feet on the floor) ? Consider using a sit-to-stand lift ? Pivot to chair, it tolerates partial weight bearing Additional activities to be implemented - Maintain head of bed >= 30 degrees - Q 2 hr turning (self/assisted) - Active range of motion - Encourage activities of daily living 
- PT/OT actively involved Move to Level 5 when the patient . 
- Can successfully comply with all activities - Tolerates trial periods of full chair position (footboard off/feet on floor) 3 times per day - Tolerates partial weight-bearing stand and pivots to chair Level 5 :  Move  (RASS > 0    (eg, weak but may tolerate increased activity) Achieve out of bed ? Utilize mobile floor life to ambulate to bedside chair Additional activities to be implemented - Maintain HOB > = 30 degrees - Q 2 hour turning (self/assisted) - Active range of motion - Patient stands/bears weight > 1 minute - Patient marches in place 
- PT/OT actively involved Patient continues to ambulate progressively longer distances as tolerated until they consistently participate and move independently. E Approved by Critical Care Committee 2-19-09 N [facility] Clinical Guidelines ABCDE DOC Flowsheet Content Variables to select when addressing section Comments ABCDE Initiated ? Yes/No  RN to address minimum q 24 hours (day shift) Target RASS ? 0 = alert and oriented ? -1 = drowsy ? -2 = light sedation ? -3= moderate sedation ? -4= deep sedation Target on standard ventilator setting should be -2; -4 with oscillator CAM -ICU ? Positive ? Negative ? Unable to assess Delirium assessment SAT Safety Screen Passed ? Yes 
? No Select yes if proceeding on to the sedation vacation (reduction of continuous sedative drip by directed by MD) Select no if your patient has any of the below reasons for not proceeding on to the daily awakening sedation vacation trial  
SAT Screen for Failure ? Active seizures ? Acute delirium tremors ? Agitation that threatens accidental line/tube removal 
? On paralytics ? MI (24-48hr) ? Abnormal ICP ? Open abdomen Select one of the options when the patient will not undergo the sedation vacation  ALSO MUST OBTAIN AN ORDER FOR no Lolly Shoulders written under nursing miscellaneous for now by either the NP or Intensivist  
Daily sedation Vacation/assessment of  ? Yes 
? No 
? Not applicable If yes, MUST see the reduction in sedation on the [facility] and please place in the comment section of the sedative sedation vacation started SBT Safety Screen Passed ? Yes 
? No Select Yes if the patient has none of the below listed reasons for not proceeding on to the SBT following reduction of sedation SBT Screen Reason for Failure ? Agitation ? O2 Sat < or = 88% 
? FIO2 > 50% ? PEEP >7 
?  MI 
 ? Vasopressor Use 
? Bilevel setting on Vent ? Oscillator in use ? Increased resp effort Select reason as appropriate for NOT proceeding on to the SBT Wake Up and Breathe Protocol

## 2020-07-29 NOTE — PROGRESS NOTES
Critical Care Daily Progress Note    Jerome Mc    7/29/2020   Patient is a 81 y.o.  male, PMH HTN, Gout, COPD, HLD, HTN, and rectocutaneous fistula, seen and evaluated at the request of Dr. Praful Pendleton for difficulty weaning patient from ventilator. The patient came in initially on 7/21/2020 after being found to have a significant sacral wound at home by home health. The patient evidently had been experiencing poor intake of food and fluids while at home as well. Today Dr. Hellen Stokes performed sigmoidectomy with colostomy creation and incision and drainaged of sacrum. The patient is a former smoker and was last seen in our office last month; at that time spirometry was performed revealing FEV1 36%. He was placed on Anoro daily with as needed albuterol. Patient also had JEANNETTE which revealed need for 2L O2 NC nightly. The patient was transported to PACU following surgery where it has been difficult to wean patient from ventilator. He was re intubated and we are asked to assist in his vent management      Date of Admission:  7/21/2020    The patient's chart is reviewed and the patient is discussed with the staff. Subjective:     Patient today is sedated on Vent on Diprovan. Off Oscar. No other issues. To sedated to respond at this timie. Review of Systems  Unable to assess since sedated. Patient Active Problem List   Diagnosis Code    Atherosclerosis of native artery of extremity with intermittent claudication ([facility] [address] I70.219    Former cigarette smoker Z87.891    HTN (hypertension) I10    Hyperlipidemia E78.5    Gout M10.9    Peripheral vascular disease ([address] I73.9    Tophaceous gout M1A. 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    Severe sepsis ([facility] A41.9, R65.20    Pressure injury of deep tissue of sacral region L89.156    Rectocutaneous fistula K60.4    Hyponatremia E87.1    Hypokalemia E87.6    Acute kidney injury (MARY) with acute tubular necrosis (ATN) ([facility]) N17.0    Acute on chronic respiratory failure with hypoxia and hypercapnia ([facility]) J96.21, J96.22    Normocytic anemia D64.9    Severe protein-calorie malnutrition ([facility]) E43    Hypomagnesemia E83.42    Hyperchloremic acidosis E87.2    Hypoproteinemia ([facility]) E77.8    Hyperkalemia E87.5    Shock, unspecified ([facility] P99.5    Metabolic encephalopathy W86.54    Encounter for weaning from ventilator ([place] [address] Z99.11       Home DME company unknown. Prior to Admission Medications   Prescriptions Last Dose Informant Patient Reported? Taking?   acetaminophen (TYLENOL) 500 mg tablet   Yes Yes   Sig: Take 1,000 mg by mouth every six (6) hours as needed for Pain. albuterol (ProAir HFA) 90 mcg/actuation inhaler   No No   Sig: Take 2 Puffs by inhalation every four (4) hours as needed for Wheezing or Shortness of Breath. albuterol (ProAir HFA) 90 mcg/actuation inhaler   No Yes   Sig: Take 2 Puffs by inhalation every six (6) hours as needed for Wheezing or Shortness of Breath. cilostazoL (PLETAL) 100 mg tablet   Yes Yes   Sig: Take  by mouth Before breakfast and dinner. clopidogreL (Plavix) 75 mg tab   Yes Yes   Sig: Take 75 mg by mouth. dilTIAZem CD (CARDIZEM CD) 120 mg ER capsule   No Yes   Sig: Take 1 Cap by mouth daily. doxycycline (MONODOX) 100 mg capsule   No No   Sig: Take 1 Cap by mouth two (2) times a day for 7 days. furosemide (Lasix) 40 mg tablet   No Yes   Sig: Take 1 Tab by mouth daily. probenecid-colchicine (ColBenemid) 500-0.5 mg per tablet   No Yes   Sig: TAKE ONE TABLET BY MOUTH DAILY   umeclidinium-vilanteroL (Anoro Ellipta) 62.5-25 mcg/actuation inhaler   No Yes   Sig: Take 1 Puff by inhalation daily.       Facility-Administered Medications: None       Past Medical History:   Diagnosis Date    Atherosclerosis of native arteries of the extremities with intermittent claudication 5/13/2014    Chronic kidney disease     elevated labs see's dr Hale Brochure ulcer ([facility] [address] 9/29/2017    Former cigarette smoker 5/13/2014    Gout     HTN (hypertension) 5/13/2014    Hypercholesterolemia     Hyperlipidemia 5/13/2014    Peripheral vascular disease ([facility] [address]      Past Surgical History:   Procedure Laterality Date    HX COLONOSCOPY      HX HEMORRHOIDECTOMY      HX MALIGNANT SKIN LESION EXCISION       Social History     Socioeconomic History    Marital status:      Spouse name: Not on file    Number of children: Not on file    Years of education: Not on file    Highest education level: Not on file   Occupational History    Not on file   Social Needs    Financial resource strain: Not on file    Food insecurity     Worry: Not on file     Inability: Not on file   [employer] needs     Medical: Not on file     Non-medical: Not on file   Tobacco Use    Smoking status: Former Smoker     Packs/day: 2.00     Years: 25.00     Pack years: 50.00     Types: Cigarettes    Smokeless tobacco: Former User   Substance and Sexual Activity    Alcohol use:  Yes     Alcohol/week: 6.7 standard drinks     Types: 8 Shots of liquor per week    Drug use: Not on file    Sexual activity: Not on file   Lifestyle    Physical activity     Days per week: Not on file     Minutes per session: Not on file    Stress: Not on file   Relationships    Social connections     Talks on phone: Not on file     Gets together: Not on file     Attends Jehovah's witness service: Not on file     Active member of club or organization: Not on file     Attends meetings of clubs or organizations: Not on file     Relationship status: Not on file    Intimate partner violence     Fear of current or ex partner: Not on file     Emotionally abused: Not on file     Physically abused: Not on file     Forced sexual activity: Not on file   Other Topics Concern    Not on file   Social History Narrative    Not on file     Family History   Problem Relation Age of Onset    Hypertension Mother     Breast Cancer Mother      Allergies   Allergen Reactions    Aspirin Swelling    Prednisone Myalgia       Current Facility-Administered Medications   Medication Dose Route Frequency    0.9% sodium chloride infusion 250 mL  250 mL IntraVENous PRN    PHENYLephrine (RIGO-SYNEPHRINE) 30 mg in 0.9% sodium chloride 250 mL infusion   mcg/min IntraVENous TITRATE    propofol (DIPRIVAN) 10 mg/mL infusion  0-50 mcg/kg/min IntraVENous TITRATE    cefTRIAXone (ROCEPHIN) 2 g in 0.9% sodium chloride (MBP/ADV) 50 mL  2 g IntraVENous Q24H    metroNIDAZOLE (FLAGYL) IVPB premix 500 mg  500 mg IntraVENous Q12H    albumin human 25% (BUMINATE) solution 12.5 g  12.5 g IntraVENous Q6H    famotidine (PF) (PEPCID) 20 mg in 0.9% sodium chloride 10 mL injection  20 mg IntraVENous Q12H    sodium hypochlorite (QUARTER STRENGTH DAKIN'S) 0.125% irrigation (bottle)   Topical Q12H    albuterol (PROVENTIL HFA, VENTOLIN HFA, PROAIR HFA) inhaler 2 Puff  2 Puff Inhalation QID RT    calcium carbonate (TUMS) chewable tablet 200 mg [elemental]  200 mg Oral TID WITH MEALS    heparin (porcine) pf 300 Units  300 Units InterCATHeter Q8H PRN    LORazepam (ATIVAN) injection 1 mg  1 mg IntraVENous Q6H PRN    vancomycin (VANCOCIN) 1,000 mg in 0.9% sodium chloride (MBP/ADV) 250 mL  1,000 mg IntraVENous Q18H    ondansetron (ZOFRAN) injection 4 mg  4 mg IntraVENous A0L PRN    folic acid (FOLVITE) tablet 1 mg  1 mg Oral DAILY    thiamine HCL (B-1) tablet 100 mg  100 mg Oral DAILY    NUTRITIONAL SUPPORT ELECTROLYTE PRN ORDERS   Does Not Apply PRN    central line flush (saline) syringe 20 mL  20 mL InterCATHeter Q8H    sodium chloride (NS) flush 5-40 mL  5-40 mL IntraVENous Q8H    sodium chloride (NS) flush 5-40 mL  5-40 mL IntraVENous PRN    enoxaparin (LOVENOX) injection 40 mg  40 mg SubCUTAneous Q24H    [Held by provider] clopidogreL (PLAVIX) tablet 75 mg  75 mg Oral DAILY    albuterol (PROVENTIL VENTOLIN) nebulizer solution 2.5 mg  2.5 mg Nebulization Q4H PRN    dilTIAZem ER (CARDIZEM CD) capsule 120 mg  120 mg Oral DAILY    probenecid-colchicine (ColBenemid) tablet 1 Tab (Patient Supplied)  1 Tab Oral DAILY    LORazepam (ATIVAN) tablet 1 mg  1 mg Oral Q6H PRN    morphine injection 2 mg  2 mg IntraVENous Q4H PRN    acetaminophen (TYLENOL) tablet 650 mg  650 mg Oral Q4H PRN         Objective:     Vitals:    07/29/20 0147 07/29/20 0202 07/29/20 0217 07/29/20 0229   BP: 115/62 114/62 116/65 116/65   Pulse: (!) 52 (!) 53 (!) 57 (!) 53   Resp:       Temp:       SpO2: 99% 99% 99%    Weight:       Height:         Ventilator Settings  Mode FIO2 Rate Tidal Volume Pressure PEEP   Assist control, VC+  40 %    405 ml     8 cm H20      Peak airway pressure: 23 cm H2O   Minute ventilation: 11 l/min          PHYSICAL EXAM     Constitutional:  the patient is orally intubated, on vent  EENMT:  Sclera clear, pupils equal, oral mucosa moist, orally intubated. Respiratory: decreased BS in the bases  Cardiovascular:  RRR without M,G,R  Gastrointestinal: soft and tender; with no bowel sounds. + TIARRA drain and colostomy  Musculoskeletal: warm without cyanosis. There is  lower extremity edema. Skin:  no jaundice or rashes,  wounds   Neurologic: sedated and not able to assess  Psychiatric:  Sedated and not able to assess    CXR: 7/28/2020 intubated with b/l infiltrates +-effusion. R > left. Impression:  Interval intubation, with improved lung volumes.     Recent Labs     07/27/20  0401 07/26/20  1430   WBC 13.6* 13.4*   HGB 9.4* 10.3*   HCT 30.0* 32.7*    324     Recent Labs     07/28/20  0442 07/27/20  0401 07/26/20  1430    146* 143   K 3.7 3.8 5.5*   * 114* 117*   GLU 91 151* 119*   CO2 24 24 23   BUN 28* 26* 26*   CREA 0.72* 0.85 0.86   MG  --  2.5* 2.4   PHOS --  3.1 3.6   CA 8.9 9.5 8.6   ALB 1.5* 2.0* 1.3*   TBILI 0.2 0.2 0.2   ALT 92* 64 76*     Recent Labs     07/28/20  1504 07/27/20  0320 07/26/20  1943   PHI 7.31* 7.47* 7.34*   PCO2I 44.9 30.4* 40.3   PO2I 129* 77 152*   HCO3I 22.4 21.9* 21.6*     Recent Labs     07/26/20  1726 07/26/20  1345   LAC 1.0 0.5       Assessment:  (Medical Decision Making)     Hospital Problems  Date Reviewed: 7/20/2020          Codes Class Noted POA    Encounter for weaning from ventilator [facility]) ICD-10-CM: Z99.11  ICD-9-CM: V46.13  7/29/2020 Unknown        Shock, unspecified ([facility] ICD-10-CM: R57.9  ICD-9-CM: 785.50  7/27/2020 Unknown        Metabolic encephalopathy C-87-VY: G93.41  ICD-9-CM: 348.31  7/27/2020 Unknown        Hyperchloremic acidosis ICD-10-CM: E87.2  ICD-9-CM: 276.2  7/26/2020 Unknown        Hypoproteinemia ([facility] ICD-10-CM: E77.8  ICD-9-CM: 273.8  7/26/2020 Unknown        Hypomagnesemia ICD-10-CM: E83.42  ICD-9-CM: 275.2  7/22/2020 Unknown        * (Principal) Severe sepsis ([facility] ICD-10-CM: A41.9, R65.20  ICD-9-CM: 038.9, 995.92  7/21/2020 Unknown        Pressure injury of deep tissue of sacral region ICD-10-CM: L89.156  ICD-9-CM: 707.03, 707.20  7/21/2020 Unknown        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-CM: K60.4  ICD-9-CM: 565.1  7/21/2020 Unknown        Hyponatremia ICD-10-CM: E87.1  ICD-9-CM: 276.1  7/21/2020 Unknown        Hypokalemia ICD-10-CM: E87.6  ICD-9-CM: 276.8  7/21/2020 Unknown        Acute kidney injury (MARY) with acute tubular necrosis (ATN) ([facility] ICD-10-CM: N17.0  ICD-9-CM: 584.5  7/21/2020 Unknown        Acute on chronic respiratory failure with hypoxia and hypercapnia (HCC) ICD-10-CM: J96.21, J96.22  ICD-9-CM: 518.84, 786.09, 799.02  7/21/2020         Normocytic anemia ICD-10-CM: D64.9  ICD-9-CM: 285.9  7/21/2020 Unknown        Severe protein-calorie malnutrition ([facility] (Chronic) ICD-10-CM: E43  ICD-9-CM: 139  7/21/2020 Yes        Chronic obstructive pulmonary disease ([facility] ICD-10-CM: J44.9  ICD-9-CM: 276 6/22/2020 Yes        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  ICD-9-CM: 274.03  5/18/2018 Unknown        Atherosclerosis of native artery of extremity with intermittent claudication (HCC) (Chronic) ICD-10-CM: J01.564  ICD-9-CM: 440.21  5/13/2014 Yes        Former cigarette smoker ICD-10-CM: O22.497  ICD-9-CM: V15.82  5/13/2014 Yes        HTN (hypertension) (Chronic) ICD-10-CM: I10  ICD-9-CM: 401.9  5/13/2014 Yes            patient with severe COPD with large decubitus on 7/28 s/p   sigmoidectomy with colostomy creation and incision and drainaged of sacrum. Was extubated and had to be re-intubated in PACU. Currently sedated on diprivan. Plan:  (Medical Decision Making)     --sedation holidation in AM to see if can come off diprivan. Currently too sedated  --wean and prior to extubation inform ICU attending since may need to be extubated on BIPAP  --decreased FIO2 to 30% and decreased RR from 20 to 14. F/u ABG in AM  --f/u x-ray and labs in AM. X-ray yesterday with ?pleural effusion vs infiltrates  --continue abx Rocephin, Vancomycin, Flagyl and cultures negative so far  --off pressors now and keep MAP > 65. On albumin  --nebs on Vent  --Previous COVID testing on 7/26/20 negative  --f/u labs and cxr in AM  --DVT/GI prophylaxis      More than 50% of the time documented was spent in face-to-face contact with the patient and in the care of the patient on the floor/unit where the patient is located.        Nader Hall MD

## 2020-07-29 NOTE — WOUND CARE
Patient seen in ICU, remains on vent. Discussed with primary nurse, patient sacral dressing done early this am. Will follow up another time for wound assessment. Daughter and son at bedside and discussed colostomy cares and teaching plan. They would both like to be included in ostomy lessons. Daughter has some experience with colostomy care with her former SO but would like a refresher. Present pouch intact and stoma viable, flush with skin. No output nor flatus as expected. Answered all questions. Will follow up later this week.

## 2020-07-29 NOTE — PROGRESS NOTES
Report given to [facility], RN. Pt alert, following commands and nodding appropriately to questions. Very low-dose propofol infusing at 5 mcg/kg/min.

## 2020-07-29 NOTE — PROGRESS NOTES
SPEECH PATHOLOGY NOTE:    Patient followed by speech therapy this admission. Currently intubated and unable to participate in ongoing treatment at this time. Will sign off for now. Please consider re-consult when medically appropriate to resume speech therapy including re-assessment of swallow function.      Melanie [address] CCC-SLP

## 2020-07-29 NOTE — PROGRESS NOTES
Bedside and Verbal shift change report given to [facility] (oncoming nurse) by Jillian Puri (offgoing nurse). Report included the following information SBAR, Kardex, ED Summary, Procedure Summary, Intake/Output, MAR and Recent Results.

## 2020-07-29 NOTE — PROGRESS NOTES
SPEECH PATHOLOGY NOTE:    Speech therapy re-consult received. He was initially evaluated by speech therapy on 7/27/20 with recommendations for mechanical soft diet/thin liquids. NPO yesterday for procedure. He ultimately required intubation due to respiratory failure, but is now extubated. Attempted to see this afternoon for re-assessment of swallow function, but unable to participate due to marginal blood pressure. RN reports he has been consuming some po (ensure, apple sauce) without difficulty. Anticipate ability to resume mechanical soft diet/thin liquids, but will follow up for bedside swallow assessment tomorrow.      Melanie [address] CCC-SLP

## 2020-07-29 NOTE — PROGRESS NOTES
Updated notes provided to [facility] Critical access hospital staff today, hopeful if pt medically stable pt can be transferred to [facility] hospital today.

## 2020-07-29 NOTE — PROGRESS NOTES
Received call from Sabas Veloz, pt's daughter, who was updated on pt condition by this RN. Family is aware that patient remains in ICU overflow area and continues to receive ventilator support. All questions answered, and daughter expressed gratitude for the update. She states she and her brother (patient's son) to visit tomorrow, and visitation guidelines reiterated. Family without further needs at this time.

## 2020-07-29 NOTE — PROGRESS NOTES
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 No cx but path  · A respiratory failure postop   · ETOH abuse with malnutrition and debility   · O2 dependent COPD    Plan:   ·  Continue Vancomycin, CTX/Flagyl. · Duration 6 weeks. · Will need extensive wound care, offloading and nutrition in addition to abx. · Unsure of dispo plans at moment--will his family be able to provide complex care? Anti-infectives:   · Vanc -  · Zosyn ---CTX/Flagyl -  · Doxy -    Subjective:   Sedation off but still intubated. Discussed with CM, hopefully he can be extubated today     Allergies   Allergen Reactions    Aspirin Swelling    Prednisone Myalgia        Review of Systems:  Review of systems not obtained due to patient factors. Objective:     Visit Vitals  /56   Pulse 74   Temp 98.1 °F (36.7 °C)   Resp 17   Ht 5' 8\" (1.727 m)   Wt 83.8 kg (184 lb 11.9 oz)   SpO2 100%   BMI 28.09 kg/m²     Temp (24hrs), Av °F (36.7 °C), Min:97.5 °F (36.4 °C), Max:98.8 °F (37.1 °C)     No changes from my previous exam   Lines:  Peripheral IV:       Physical Exam:    General:  Opens eyes,  intubated appears stated age   Eyes:  Sclera anicteric. Pupils equally round and reactive to light. Mouth/Throat: Mucous membranes normal, oral pharynx clear   Neck: Supple   Lungs:   Intubated--50% fio2   CV:  Regular rate and rhythm,no murmur, click, rub or gallop   Abdomen:   Soft, non-tender.  bowel sounds normal. non-distended   Extremities: No cyanosis or edema   Skin: Sacral postop dsg    Lymph nodes: Cervical and supraclavicular normal   Musculoskeletal: No swelling or deformity   Lines/Devices:  Intact, no erythema, drainage or tenderness   Psych: Sedated        Data Review:     CBC:  Recent Labs     20  0309 20  0401 20  1430   WBC 11.2* 13.6* 13.4*   GRANS --   --  63   MONOS  --   --  10   EOS  --   --  2   ANEU  --   --  8.5*   ABL  --   --  2.4   HGB 8.1* 9.4* 10.3*   HCT 25.7* 30.0* 32.7*    289 324       BMP:  Recent Labs     07/29/20 0309 07/28/20 0442 07/27/20  0401   CREA 0.87 0.72* 0.85   BUN 29* 28* 26*    143 146*   K 4.3 3.7 3.8   * 113* 114*   CO2 22 24 24   AGAP 8 6* 8   GLU 93 91 151*       LFTS:  Recent Labs     07/29/20 0309 07/28/20 0442 07/27/20 0401   TBILI 0.2 0.2 0.2   ALT 71* 92* 64   * 153* 165*   TP 6.3 6.3 7.1   ALB 1.8* 1.5* 2.0*       Microbiology:     All Micro Results     Procedure Component Value Units Date/Time    CULTURE, BLOOD [[id number]] Collected:  07/26/20 1430    Order Status:  Completed Specimen:  Blood Updated:  07/29/20 0810     Special Requests: --        RIGHT  ARM       Culture result: NO GROWTH 3 DAYS       CULTURE, BLOOD [[id number]] Collected:  07/26/20 1430    Order Status:  Completed Specimen:  Blood Updated:  07/29/20 0810     Special Requests: --        RIGHT  FOREARM       Culture result: NO GROWTH 3 DAYS       CULTURE, BLOOD [[id number]] Collected:  07/21/20 2015    Order Status:  Completed Specimen:  Blood Updated:  07/26/20 0644     Special Requests: --        LEFT  Antecubital       Culture result: NO GROWTH 5 DAYS       CULTURE, URINE [[id number]] Collected:  07/21/20 1815    Order Status:  Canceled Specimen:  Cath Urine           Imaging:   See HPI/EPIC     Signed By: Marisol Gamboa NP     July 29, 2020

## 2020-07-29 NOTE — PROGRESS NOTES
Ventilator check complete; patient has a #7.5 ET tube secured at the 26 at the lip. Patient is sedated. Patient is not able to follow commands. Breath sounds are clear. Trachea is midline, Negative for subcutaneous air, and chest excursion is symmetrical. Patient is also Negative for cyanosis and is Negative for pitting edema. All alarms are set and audible. Resuscitation bag and mask are at the head of the bed.       Ventilator Settings  Mode FIO2 Rate Tidal Volume Pressure PEEP I:E Ratio   Assist control, VC+  40 %(weaned post ABG)   20   450    8 cm H20  1:2.3      Peak airway pressure: 23 cm H2O   Minute ventilation: 9 l/min       Lillian Schulz

## 2020-07-29 NOTE — PROGRESS NOTES
Sacral dressing changed with Dakins 0.125% soaked Kerlix, 4x4s, abd pad and secured with paper tape. Pt did have moderate amount of fecal matter around wound, which was rinsed and cleaned prior to packing. Patient given morphine prior to dressing change and tolerated procedure well. Patient is following commands, gesturing and nodding appropriately.

## 2020-07-30 ENCOUNTER — PATIENT OUTREACH (OUTPATIENT)
Dept: CASE MANAGEMENT | Age: 85
End: 2020-07-30

## 2020-07-30 LAB
ALBUMIN SERPL-MCNC: 2.2 G/DL (ref 3.2–4.6)
ALBUMIN/GLOB SERPL: 0.5 {RATIO} (ref 1.2–3.5)
ALP SERPL-CCNC: 152 U/L (ref 50–136)
ALT SERPL-CCNC: 68 U/L (ref 12–65)
ANION GAP SERPL CALC-SCNC: 6 MMOL/L (ref 7–16)
AST SERPL-CCNC: 64 U/L (ref 15–37)
BILIRUB SERPL-MCNC: 0.2 MG/DL (ref 0.2–1.1)
BUN SERPL-MCNC: 25 MG/DL (ref 8–23)
CALCIUM SERPL-MCNC: 9.7 MG/DL (ref 8.3–10.4)
CHLORIDE SERPL-SCNC: 114 MMOL/L (ref 98–107)
CO2 SERPL-SCNC: 25 MMOL/L (ref 21–32)
CREAT SERPL-MCNC: 0.91 MG/DL (ref 0.8–1.5)
DIFFERENTIAL METHOD BLD: ABNORMAL
EOSINOPHIL # BLD: 0.3 K/UL (ref 0–0.8)
EOSINOPHIL NFR BLD: 4 %
ERYTHROCYTE [DISTWIDTH] IN BLOOD BY AUTOMATED COUNT: 14.4 %
GLOBULIN SER CALC-MCNC: 4.7 G/DL (ref 2.3–3.5)
GLUCOSE SERPL-MCNC: 84 MG/DL (ref 65–100)
HCT VFR BLD AUTO: 27.5 % (ref 41.1–50.3)
HGB BLD-MCNC: 8.6 G/DL (ref 13.6–17.2)
LYMPHOCYTES # BLD: 1.7 K/UL (ref 0.5–4.6)
LYMPHOCYTES NFR BLD: 22 %
MCH RBC QN AUTO: 31.7 PG (ref 26.1–32.9)
MCHC RBC AUTO-ENTMCNC: 31.3 G/DL (ref 31.4–35)
MCV RBC AUTO: 101.5 FL (ref 79.6–97.8)
MONOCYTES # BLD: 0.7 K/UL (ref 0.1–1.3)
MONOCYTES NFR BLD: 9 %
NEUTS SEG # BLD: 5.2 K/UL (ref 1.7–8.2)
NEUTS SEG NFR BLD: 65 %
NRBC # BLD: 0 K/UL
PLATELET # BLD AUTO: 259 K/UL
PMV BLD AUTO: 9.9 FL (ref 9.4–12.3)
POTASSIUM SERPL-SCNC: 3.8 MMOL/L (ref 3.5–5.1)
PROT SERPL-MCNC: 6.9 G/DL (ref 6.3–8.2)
RBC # BLD AUTO: 2.71 M/UL
SODIUM SERPL-SCNC: 145 MMOL/L (ref 136–145)
VANCOMYCIN TROUGH SERPL-MCNC: 20 UG/ML (ref 5–20)
WBC # BLD AUTO: 7.9 K/UL (ref 4.3–11.1)

## 2020-07-30 PROCEDURE — 85025 COMPLETE CBC W/AUTO DIFF WBC: CPT

## 2020-07-30 PROCEDURE — 36415 COLL VENOUS BLD VENIPUNCTURE: CPT

## 2020-07-30 PROCEDURE — 80053 COMPREHEN METABOLIC PANEL: CPT

## 2020-07-30 PROCEDURE — 80202 ASSAY OF VANCOMYCIN: CPT

## 2020-07-31 LAB
BACTERIA SPEC CULT: NORMAL
BACTERIA SPEC CULT: NORMAL
SERVICE CMNT-IMP: NORMAL
SERVICE CMNT-IMP: NORMAL

## 2020-08-01 LAB
ABO + RH BLD: NORMAL
BLD PROD TYP BPU: NORMAL
BLOOD GROUP ANTIBODIES SERPL: NORMAL
BPU ID: NORMAL
CROSSMATCH RESULT,%XM: NORMAL
SPECIMEN EXP DATE BLD: NORMAL
STATUS OF UNIT,%ST: NORMAL
UNIT DIVISION, %UDIV: 0

## 2020-08-02 LAB — VANCOMYCIN TROUGH SERPL-MCNC: 15.7 UG/ML (ref 5–20)

## 2020-08-02 PROCEDURE — 80202 ASSAY OF VANCOMYCIN: CPT

## 2020-08-02 PROCEDURE — 36415 COLL VENOUS BLD VENIPUNCTURE: CPT

## 2020-08-03 LAB
ALBUMIN SERPL-MCNC: 1.8 G/DL (ref 3.2–4.6)
ALBUMIN/GLOB SERPL: 0.4 {RATIO} (ref 1.2–3.5)
ALP SERPL-CCNC: 142 U/L (ref 50–136)
ALT SERPL-CCNC: 29 U/L (ref 12–65)
ANION GAP SERPL CALC-SCNC: 7 MMOL/L (ref 7–16)
AST SERPL-CCNC: 26 U/L (ref 15–37)
BASOPHILS # BLD: 0.1 K/UL (ref 0–0.2)
BASOPHILS NFR BLD: 1 % (ref 0–2)
BILIRUB SERPL-MCNC: 0.3 MG/DL (ref 0.2–1.1)
BUN SERPL-MCNC: 16 MG/DL (ref 8–23)
CALCIUM SERPL-MCNC: 8.8 MG/DL (ref 8.3–10.4)
CHLORIDE SERPL-SCNC: 108 MMOL/L (ref 98–107)
CO2 SERPL-SCNC: 25 MMOL/L (ref 21–32)
CREAT SERPL-MCNC: 0.73 MG/DL (ref 0.8–1.5)
DIFFERENTIAL METHOD BLD: ABNORMAL
EOSINOPHIL # BLD: 0.4 K/UL (ref 0–0.8)
EOSINOPHIL NFR BLD: 5 % (ref 0.5–7.8)
ERYTHROCYTE [DISTWIDTH] IN BLOOD BY AUTOMATED COUNT: 14.1 % (ref 11.9–14.6)
GLOBULIN SER CALC-MCNC: 4.6 G/DL (ref 2.3–3.5)
GLUCOSE SERPL-MCNC: 90 MG/DL (ref 65–100)
HCT VFR BLD AUTO: 28.7 % (ref 41.1–50.3)
HGB BLD-MCNC: 9.3 G/DL (ref 13.6–17.2)
IMM GRANULOCYTES # BLD AUTO: 0 K/UL (ref 0–0.5)
IMM GRANULOCYTES NFR BLD AUTO: 0 % (ref 0–5)
LYMPHOCYTES # BLD: 2.1 K/UL (ref 0.5–4.6)
LYMPHOCYTES NFR BLD: 26 % (ref 13–44)
MCH RBC QN AUTO: 31.3 PG (ref 26.1–32.9)
MCHC RBC AUTO-ENTMCNC: 32.4 G/DL (ref 31.4–35)
MCV RBC AUTO: 96.6 FL (ref 79.6–97.8)
MONOCYTES # BLD: 0.9 K/UL (ref 0.1–1.3)
MONOCYTES NFR BLD: 11 % (ref 4–12)
NEUTS SEG # BLD: 4.6 K/UL (ref 1.7–8.2)
NEUTS SEG NFR BLD: 57 % (ref 43–78)
NRBC # BLD: 0 K/UL (ref 0–0.2)
PLATELET # BLD AUTO: 308 K/UL (ref 150–450)
PMV BLD AUTO: 9.2 FL (ref 9.4–12.3)
POTASSIUM SERPL-SCNC: 3.5 MMOL/L (ref 3.5–5.1)
PROT SERPL-MCNC: 6.4 G/DL (ref 6.3–8.2)
RBC # BLD AUTO: 2.97 M/UL (ref 4.23–5.6)
SODIUM SERPL-SCNC: 140 MMOL/L (ref 136–145)
WBC # BLD AUTO: 8 K/UL (ref 4.3–11.1)

## 2020-08-03 PROCEDURE — 85025 COMPLETE CBC W/AUTO DIFF WBC: CPT

## 2020-08-03 PROCEDURE — 80053 COMPREHEN METABOLIC PANEL: CPT

## 2020-08-03 PROCEDURE — 36415 COLL VENOUS BLD VENIPUNCTURE: CPT

## 2020-08-04 ENCOUNTER — APPOINTMENT (OUTPATIENT)
Dept: GENERAL RADIOLOGY | Age: 85
End: 2020-08-04
Attending: INTERNAL MEDICINE
Payer: MEDICARE

## 2020-08-04 PROCEDURE — 74018 RADEX ABDOMEN 1 VIEW: CPT

## 2020-08-05 LAB
ANION GAP SERPL CALC-SCNC: 3 MMOL/L (ref 7–16)
BUN SERPL-MCNC: 17 MG/DL (ref 8–23)
CALCIUM SERPL-MCNC: 9 MG/DL (ref 8.3–10.4)
CHLORIDE SERPL-SCNC: 110 MMOL/L (ref 98–107)
CO2 SERPL-SCNC: 27 MMOL/L (ref 21–32)
CREAT SERPL-MCNC: 0.93 MG/DL (ref 0.8–1.5)
GLUCOSE SERPL-MCNC: 108 MG/DL (ref 65–100)
MAGNESIUM SERPL-MCNC: 2.2 MG/DL (ref 1.8–2.4)
PHOSPHATE SERPL-MCNC: 3 MG/DL (ref 2.3–3.7)
POTASSIUM SERPL-SCNC: 3.6 MMOL/L (ref 3.5–5.1)
SODIUM SERPL-SCNC: 140 MMOL/L (ref 136–145)
VANCOMYCIN TROUGH SERPL-MCNC: 14.5 UG/ML (ref 5–20)

## 2020-08-05 PROCEDURE — 80048 BASIC METABOLIC PNL TOTAL CA: CPT

## 2020-08-05 PROCEDURE — 80202 ASSAY OF VANCOMYCIN: CPT

## 2020-08-05 PROCEDURE — 84100 ASSAY OF PHOSPHORUS: CPT

## 2020-08-05 PROCEDURE — 36415 COLL VENOUS BLD VENIPUNCTURE: CPT

## 2020-08-05 PROCEDURE — 83735 ASSAY OF MAGNESIUM: CPT

## 2020-08-10 LAB
ALBUMIN SERPL-MCNC: 1.7 G/DL (ref 3.2–4.6)
ALBUMIN/GLOB SERPL: 0.3 {RATIO} (ref 1.2–3.5)
ALP SERPL-CCNC: 120 U/L (ref 50–136)
ALT SERPL-CCNC: 16 U/L (ref 12–65)
ANION GAP SERPL CALC-SCNC: 7 MMOL/L (ref 7–16)
AST SERPL-CCNC: 20 U/L (ref 15–37)
BASOPHILS # BLD: 0.1 K/UL (ref 0–0.2)
BASOPHILS NFR BLD: 1 % (ref 0–2)
BILIRUB SERPL-MCNC: 0.3 MG/DL (ref 0.2–1.1)
BUN SERPL-MCNC: 20 MG/DL (ref 8–23)
CALCIUM SERPL-MCNC: 9 MG/DL (ref 8.3–10.4)
CHLORIDE SERPL-SCNC: 108 MMOL/L (ref 98–107)
CO2 SERPL-SCNC: 25 MMOL/L (ref 21–32)
CREAT SERPL-MCNC: 0.78 MG/DL (ref 0.8–1.5)
DIFFERENTIAL METHOD BLD: ABNORMAL
EOSINOPHIL # BLD: 0.6 K/UL (ref 0–0.8)
EOSINOPHIL NFR BLD: 7 % (ref 0.5–7.8)
ERYTHROCYTE [DISTWIDTH] IN BLOOD BY AUTOMATED COUNT: 14.4 % (ref 11.9–14.6)
GLOBULIN SER CALC-MCNC: 4.9 G/DL (ref 2.3–3.5)
GLUCOSE SERPL-MCNC: 98 MG/DL (ref 65–100)
HCT VFR BLD AUTO: 26 % (ref 41.1–50.3)
HGB BLD-MCNC: 8.6 G/DL (ref 13.6–17.2)
IMM GRANULOCYTES # BLD AUTO: 0.1 K/UL (ref 0–0.5)
IMM GRANULOCYTES NFR BLD AUTO: 1 % (ref 0–5)
LYMPHOCYTES # BLD: 2.3 K/UL (ref 0.5–4.6)
LYMPHOCYTES NFR BLD: 28 % (ref 13–44)
MCH RBC QN AUTO: 31.5 PG (ref 26.1–32.9)
MCHC RBC AUTO-ENTMCNC: 33.1 G/DL (ref 31.4–35)
MCV RBC AUTO: 95.2 FL (ref 79.6–97.8)
MONOCYTES # BLD: 1.1 K/UL (ref 0.1–1.3)
MONOCYTES NFR BLD: 13 % (ref 4–12)
NEUTS SEG # BLD: 4.3 K/UL (ref 1.7–8.2)
NEUTS SEG NFR BLD: 51 % (ref 43–78)
NRBC # BLD: 0 K/UL (ref 0–0.2)
PLATELET # BLD AUTO: 497 K/UL (ref 150–450)
PMV BLD AUTO: 8.8 FL (ref 9.4–12.3)
POTASSIUM SERPL-SCNC: 4.3 MMOL/L (ref 3.5–5.1)
PROT SERPL-MCNC: 6.6 G/DL (ref 6.3–8.2)
RBC # BLD AUTO: 2.73 M/UL (ref 4.23–5.6)
SODIUM SERPL-SCNC: 140 MMOL/L (ref 136–145)
VANCOMYCIN TROUGH SERPL-MCNC: 19.2 UG/ML (ref 5–20)
WBC # BLD AUTO: 8.4 K/UL (ref 4.3–11.1)

## 2020-08-10 PROCEDURE — 80053 COMPREHEN METABOLIC PANEL: CPT

## 2020-08-10 PROCEDURE — 85025 COMPLETE CBC W/AUTO DIFF WBC: CPT

## 2020-08-10 PROCEDURE — 36415 COLL VENOUS BLD VENIPUNCTURE: CPT

## 2020-08-10 PROCEDURE — 80202 ASSAY OF VANCOMYCIN: CPT

## 2020-08-11 NOTE — DISCHARGE SUMMARY
Discharge Note    Patient: Jerome Mc                 MRN: [id number]                      YOB: 1935   Age: 81 y.o.           Sex: male                             Admission date:  7/21/2020  Discharge date:  7/29/2020    Admitting Diagnosis:  Severe sepsis ([place] [address] [A41.9, R65.20]    Discharge Diagnoses:    Hospital Problems as of 7/29/2020 Date Reviewed: 7/20/2020          Codes Class Noted - Resolved POA    Encounter for weaning from ventilator [facility]) ICD-10-CM: Z99.11  ICD-9-CM: V46.13  7/29/2020 - Present Unknown        Shock, unspecified ([place] [address] ICD-10-CM: R57.9  ICD-9-CM: 785.50  7/27/2020 - Present Unknown        Metabolic encephalopathy SDQ-89-QA: G93.41  ICD-9-CM: 348.31  7/27/2020 - Present Unknown        Hyperchloremic acidosis ICD-10-CM: E87.2  ICD-9-CM: 276.2  7/26/2020 - Present Unknown        Hypoproteinemia ([place] [address] ICD-10-CM: E77.8  ICD-9-CM: 273.8  7/26/2020 - Present Unknown        Hyperkalemia ICD-10-CM: E87.5  ICD-9-CM: 276.7  7/26/2020 - Present         Hypomagnesemia ICD-10-CM: E83.42  ICD-9-CM: 275.2  7/22/2020 - Present Unknown        * (Principal) Severe sepsis ([place] [address] ICD-10-CM: A41.9, R65.20  ICD-9-CM: 038.9, 995.92  7/21/2020 - Present Unknown        Pressure injury of deep tissue of sacral region ICD-10-CM: L89.156  ICD-9-CM: 707.03, 707.20  7/21/2020 - Present Unknown        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9  ICD-9-CM: 285.9 7/21/2020 - Present Unknown        Severe protein-calorie malnutrition (HCC) (Chronic) ICD-10-CM: E43  ICD-9-CM: 262  7/21/2020 - Present Yes        Chronic obstructive pulmonary disease ([address] ICD-10-CM: J44.9  ICD-9-CM: 369  6/22/2020 - Present Yes        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  ICD-9-CM: 274.03  5/18/2018 - Present Unknown        Atherosclerosis of native artery of extremity with intermittent claudication (HCC) (Chronic) ICD-10-CM: B96.103  ICD-9-CM: 440.21  5/13/2014 - Present Yes        Former cigarette smoker ICD-10-CM: Y07.827  ICD-9-CM: V15.82  5/13/2014 - Present Yes        HTN (hypertension) (Chronic) ICD-10-CM: I10  ICD-9-CM: 401.9  5/13/2014 - Present Yes              Consultants:  [facility]                           General Surgery                            Infectious Disease     Studies/Procedures:  Multiple CXRs  Sacral wound debridement - 7/23/20  COLOSTOMY CREATION DIVERTING LAPAROSCOPIC/ 632  INCISION AND DRAINAGE OF SACRUM - 1ST PROCEDURE LATERAL - 7/28/20    Disposition: Kush  Dicharged Condition: stable    Hospital course: Admitted with dyspnea. He was in septic shock requiring pressor support. He had a sacral wound with fistula which was felt to be infected. Dr. Maryellen Rosario, surgeon, was consulted and he took him to the OR on 7/23 for wound debridement. There was evidence of osteomyelitis. He then underwent a diverting colostomy on 7/28 with I and D of sacral wound. He was intubated for a couple of days post op but tolerated extubation on 7/29. He was seen by ID and 6 weeks of antibiotics recommended in addition to wound care. He was transferred to [facility] for further acute care. Discharge Medications: Medications were reconciled by another provider and not available for my review for this summary.      Followup/Outpt Studies:  Activity: Activity as tolerated  Diet: mechanical soft with thin liquids  Wound Care: wound care per wound care specialist and surgery    Panda Loyola NP  August 11, 2020    --Total discharge greater than 30 minutes in duration.

## 2020-08-13 LAB — VANCOMYCIN TROUGH SERPL-MCNC: 23 UG/ML (ref 5–20)

## 2020-08-13 PROCEDURE — 36415 COLL VENOUS BLD VENIPUNCTURE: CPT

## 2020-08-13 PROCEDURE — 80202 ASSAY OF VANCOMYCIN: CPT

## 2020-08-15 LAB
ANION GAP SERPL CALC-SCNC: 4 MMOL/L (ref 7–16)
BASOPHILS # BLD: 0.1 K/UL (ref 0–0.2)
BASOPHILS NFR BLD: 1 % (ref 0–2)
BUN SERPL-MCNC: 31 MG/DL (ref 8–23)
CALCIUM SERPL-MCNC: 9.6 MG/DL (ref 8.3–10.4)
CHLORIDE SERPL-SCNC: 112 MMOL/L (ref 98–107)
CO2 SERPL-SCNC: 26 MMOL/L (ref 21–32)
CREAT SERPL-MCNC: 1.01 MG/DL (ref 0.8–1.5)
DIFFERENTIAL METHOD BLD: ABNORMAL
EOSINOPHIL # BLD: 0.3 K/UL (ref 0–0.8)
EOSINOPHIL NFR BLD: 2 % (ref 0.5–7.8)
ERYTHROCYTE [DISTWIDTH] IN BLOOD BY AUTOMATED COUNT: 15.4 % (ref 11.9–14.6)
GLUCOSE SERPL-MCNC: 90 MG/DL (ref 65–100)
HCT VFR BLD AUTO: 30.8 % (ref 41.1–50.3)
HGB BLD-MCNC: 9.3 G/DL (ref 13.6–17.2)
IMM GRANULOCYTES # BLD AUTO: 0.2 K/UL (ref 0–0.5)
IMM GRANULOCYTES NFR BLD AUTO: 2 % (ref 0–5)
LYMPHOCYTES # BLD: 2.3 K/UL (ref 0.5–4.6)
LYMPHOCYTES NFR BLD: 20 % (ref 13–44)
MCH RBC QN AUTO: 30.1 PG (ref 26.1–32.9)
MCHC RBC AUTO-ENTMCNC: 30.2 G/DL (ref 31.4–35)
MCV RBC AUTO: 99.7 FL (ref 79.6–97.8)
MONOCYTES # BLD: 1.8 K/UL (ref 0.1–1.3)
MONOCYTES NFR BLD: 16 % (ref 4–12)
NEUTS SEG # BLD: 6.6 K/UL (ref 1.7–8.2)
NEUTS SEG NFR BLD: 59 % (ref 43–78)
NRBC # BLD: 0 K/UL (ref 0–0.2)
PLATELET # BLD AUTO: 388 K/UL (ref 150–450)
PMV BLD AUTO: 8.5 FL (ref 9.4–12.3)
POTASSIUM SERPL-SCNC: 4.8 MMOL/L (ref 3.5–5.1)
RBC # BLD AUTO: 3.09 M/UL (ref 4.23–5.6)
SODIUM SERPL-SCNC: 142 MMOL/L (ref 136–145)
WBC # BLD AUTO: 11.3 K/UL (ref 4.3–11.1)

## 2020-08-15 PROCEDURE — 80048 BASIC METABOLIC PNL TOTAL CA: CPT

## 2020-08-15 PROCEDURE — 85025 COMPLETE CBC W/AUTO DIFF WBC: CPT

## 2020-08-15 PROCEDURE — 36415 COLL VENOUS BLD VENIPUNCTURE: CPT

## 2020-08-17 LAB
ALBUMIN SERPL-MCNC: 2 G/DL (ref 3.2–4.6)
ALBUMIN/GLOB SERPL: 0.4 {RATIO} (ref 1.2–3.5)
ALP SERPL-CCNC: 120 U/L (ref 50–136)
ALT SERPL-CCNC: 13 U/L (ref 12–65)
ANION GAP SERPL CALC-SCNC: 5 MMOL/L (ref 7–16)
AST SERPL-CCNC: 17 U/L (ref 15–37)
BASOPHILS # BLD: 0.1 K/UL (ref 0–0.2)
BASOPHILS NFR BLD: 1 % (ref 0–2)
BILIRUB SERPL-MCNC: 0.2 MG/DL (ref 0.2–1.1)
BUN SERPL-MCNC: 26 MG/DL (ref 8–23)
CALCIUM SERPL-MCNC: 9.1 MG/DL (ref 8.3–10.4)
CHLORIDE SERPL-SCNC: 109 MMOL/L (ref 98–107)
CO2 SERPL-SCNC: 25 MMOL/L (ref 21–32)
CREAT SERPL-MCNC: 0.76 MG/DL (ref 0.8–1.5)
CRP SERPL-MCNC: 3.8 MG/DL (ref 0–0.9)
DIFFERENTIAL METHOD BLD: ABNORMAL
EOSINOPHIL # BLD: 0.5 K/UL (ref 0–0.8)
EOSINOPHIL NFR BLD: 5 % (ref 0.5–7.8)
ERYTHROCYTE [DISTWIDTH] IN BLOOD BY AUTOMATED COUNT: 15.2 % (ref 11.9–14.6)
ERYTHROCYTE [SEDIMENTATION RATE] IN BLOOD: >120 MM/HR (ref 0–20)
GLOBULIN SER CALC-MCNC: 5 G/DL (ref 2.3–3.5)
GLUCOSE SERPL-MCNC: 90 MG/DL (ref 65–100)
HCT VFR BLD AUTO: 29.1 % (ref 41.1–50.3)
HGB BLD-MCNC: 9.2 G/DL (ref 13.6–17.2)
IMM GRANULOCYTES # BLD AUTO: 0.1 K/UL (ref 0–0.5)
IMM GRANULOCYTES NFR BLD AUTO: 1 % (ref 0–5)
LYMPHOCYTES # BLD: 2.4 K/UL (ref 0.5–4.6)
LYMPHOCYTES NFR BLD: 24 % (ref 13–44)
MCH RBC QN AUTO: 30.4 PG (ref 26.1–32.9)
MCHC RBC AUTO-ENTMCNC: 31.6 G/DL (ref 31.4–35)
MCV RBC AUTO: 96 FL (ref 79.6–97.8)
MONOCYTES # BLD: 1.2 K/UL (ref 0.1–1.3)
MONOCYTES NFR BLD: 12 % (ref 4–12)
NEUTS SEG # BLD: 5.7 K/UL (ref 1.7–8.2)
NEUTS SEG NFR BLD: 57 % (ref 43–78)
NRBC # BLD: 0 K/UL (ref 0–0.2)
PLATELET # BLD AUTO: 345 K/UL (ref 150–450)
PMV BLD AUTO: 8.6 FL (ref 9.4–12.3)
POTASSIUM SERPL-SCNC: 4.3 MMOL/L (ref 3.5–5.1)
PROT SERPL-MCNC: 7 G/DL (ref 6.3–8.2)
RBC # BLD AUTO: 3.03 M/UL (ref 4.23–5.6)
SODIUM SERPL-SCNC: 139 MMOL/L (ref 136–145)
VANCOMYCIN TROUGH SERPL-MCNC: 16.2 UG/ML (ref 5–20)
WBC # BLD AUTO: 10 K/UL (ref 4.3–11.1)

## 2020-08-17 PROCEDURE — 36415 COLL VENOUS BLD VENIPUNCTURE: CPT

## 2020-08-17 PROCEDURE — 85652 RBC SED RATE AUTOMATED: CPT

## 2020-08-17 PROCEDURE — 85025 COMPLETE CBC W/AUTO DIFF WBC: CPT

## 2020-08-17 PROCEDURE — 80202 ASSAY OF VANCOMYCIN: CPT

## 2020-08-17 PROCEDURE — 80053 COMPREHEN METABOLIC PANEL: CPT

## 2020-08-17 PROCEDURE — 86140 C-REACTIVE PROTEIN: CPT

## 2020-08-21 LAB — VANCOMYCIN TROUGH SERPL-MCNC: 9.6 UG/ML (ref 5–20)

## 2020-08-21 PROCEDURE — 36415 COLL VENOUS BLD VENIPUNCTURE: CPT

## 2020-08-21 PROCEDURE — 80202 ASSAY OF VANCOMYCIN: CPT

## 2020-08-24 LAB
ALBUMIN SERPL-MCNC: 1.9 G/DL (ref 3.2–4.6)
ALBUMIN/GLOB SERPL: 0.4 {RATIO} (ref 1.2–3.5)
ALP SERPL-CCNC: 108 U/L (ref 50–136)
ALT SERPL-CCNC: 17 U/L (ref 12–65)
ANION GAP SERPL CALC-SCNC: 8 MMOL/L (ref 7–16)
AST SERPL-CCNC: 16 U/L (ref 15–37)
BASOPHILS # BLD: 0.1 K/UL (ref 0–0.2)
BASOPHILS NFR BLD: 1 % (ref 0–2)
BILIRUB SERPL-MCNC: 0.2 MG/DL (ref 0.2–1.1)
BUN SERPL-MCNC: 42 MG/DL (ref 8–23)
CALCIUM SERPL-MCNC: 9.7 MG/DL (ref 8.3–10.4)
CHLORIDE SERPL-SCNC: 110 MMOL/L (ref 98–107)
CO2 SERPL-SCNC: 22 MMOL/L (ref 21–32)
CREAT SERPL-MCNC: 0.86 MG/DL (ref 0.8–1.5)
CRP SERPL-MCNC: 4.4 MG/DL (ref 0–0.9)
DIFFERENTIAL METHOD BLD: ABNORMAL
EOSINOPHIL # BLD: 0.5 K/UL (ref 0–0.8)
EOSINOPHIL NFR BLD: 5 % (ref 0.5–7.8)
ERYTHROCYTE [DISTWIDTH] IN BLOOD BY AUTOMATED COUNT: 15.4 % (ref 11.9–14.6)
ERYTHROCYTE [SEDIMENTATION RATE] IN BLOOD: >120 MM/HR (ref 0–20)
GLOBULIN SER CALC-MCNC: 5.4 G/DL (ref 2.3–3.5)
GLUCOSE SERPL-MCNC: 99 MG/DL (ref 65–100)
HCT VFR BLD AUTO: 29.1 % (ref 41.1–50.3)
HGB BLD-MCNC: 9 G/DL (ref 13.6–17.2)
IMM GRANULOCYTES # BLD AUTO: 0.1 K/UL (ref 0–0.5)
IMM GRANULOCYTES NFR BLD AUTO: 1 % (ref 0–5)
LYMPHOCYTES # BLD: 1.8 K/UL (ref 0.5–4.6)
LYMPHOCYTES NFR BLD: 18 % (ref 13–44)
MCH RBC QN AUTO: 30.2 PG (ref 26.1–32.9)
MCHC RBC AUTO-ENTMCNC: 30.9 G/DL (ref 31.4–35)
MCV RBC AUTO: 97.7 FL (ref 79.6–97.8)
MONOCYTES # BLD: 1.1 K/UL (ref 0.1–1.3)
MONOCYTES NFR BLD: 11 % (ref 4–12)
NEUTS SEG # BLD: 6.3 K/UL (ref 1.7–8.2)
NEUTS SEG NFR BLD: 64 % (ref 43–78)
NRBC # BLD: 0 K/UL (ref 0–0.2)
PLATELET # BLD AUTO: 356 K/UL (ref 150–450)
PMV BLD AUTO: 8.8 FL (ref 9.4–12.3)
POTASSIUM SERPL-SCNC: 4.9 MMOL/L (ref 3.5–5.1)
PROT SERPL-MCNC: 7.3 G/DL (ref 6.3–8.2)
RBC # BLD AUTO: 2.98 M/UL (ref 4.23–5.6)
SODIUM SERPL-SCNC: 140 MMOL/L (ref 136–145)
VANCOMYCIN TROUGH SERPL-MCNC: 12.6 UG/ML (ref 5–20)
WBC # BLD AUTO: 9.8 K/UL (ref 4.3–11.1)

## 2020-08-24 PROCEDURE — 36415 COLL VENOUS BLD VENIPUNCTURE: CPT

## 2020-08-24 PROCEDURE — 80053 COMPREHEN METABOLIC PANEL: CPT

## 2020-08-24 PROCEDURE — 86140 C-REACTIVE PROTEIN: CPT

## 2020-08-24 PROCEDURE — 85025 COMPLETE CBC W/AUTO DIFF WBC: CPT

## 2020-08-24 PROCEDURE — 80202 ASSAY OF VANCOMYCIN: CPT

## 2020-08-24 PROCEDURE — 85652 RBC SED RATE AUTOMATED: CPT

## 2020-08-26 PROCEDURE — 87635 SARS-COV-2 COVID-19 AMP PRB: CPT

## 2020-08-27 LAB
COVID-19 RAPID TEST, COVR: NOT DETECTED
SARS COV-2, XPGCVT: NEGATIVE
SOURCE, COVRS: NORMAL

## 2020-08-28 ENCOUNTER — PATIENT OUTREACH (OUTPATIENT)
Dept: CASE MANAGEMENT | Age: 85
End: 2020-08-28

## 2020-08-28 LAB — VANCOMYCIN TROUGH SERPL-MCNC: 19.7 UG/ML (ref 5–20)

## 2020-08-28 PROCEDURE — 36415 COLL VENOUS BLD VENIPUNCTURE: CPT

## 2020-08-28 PROCEDURE — 80202 ASSAY OF VANCOMYCIN: CPT

## 2020-08-28 NOTE — PROGRESS NOTES
Attempted initial BENEDICTO outreach, noted per [address] patient remains currently admitted at [facility]. BENEDICTO is not indicated at this time.

## 2020-09-03 ENCOUNTER — HOSPITAL ENCOUNTER (OUTPATIENT)
Dept: LAB | Age: 85
Discharge: HOME | End: 2020-09-03

## 2020-09-03 LAB
ALBUMIN SERPL-MCNC: 2.3 G/DL (ref 3.2–4.6)
ALBUMIN/GLOB SERPL: 0.5 {RATIO} (ref 1.2–3.5)
ALP SERPL-CCNC: 123 U/L (ref 50–136)
ALT SERPL-CCNC: 16 U/L (ref 12–65)
ANION GAP SERPL CALC-SCNC: 7 MMOL/L (ref 7–16)
AST SERPL-CCNC: 18 U/L (ref 15–37)
BILIRUB SERPL-MCNC: 0.2 MG/DL (ref 0.2–1.1)
BUN SERPL-MCNC: 34 MG/DL (ref 8–23)
CALCIUM SERPL-MCNC: 9.2 MG/DL (ref 8.3–10.4)
CHLORIDE SERPL-SCNC: 111 MMOL/L (ref 98–107)
CO2 SERPL-SCNC: 21 MMOL/L (ref 21–32)
CREAT SERPL-MCNC: 1.07 MG/DL (ref 0.8–1.5)
GLOBULIN SER CALC-MCNC: 4.3 G/DL (ref 2.3–3.5)
GLUCOSE SERPL-MCNC: 78 MG/DL (ref 65–100)
POTASSIUM SERPL-SCNC: 4.3 MMOL/L (ref 3.5–5.1)
PROT SERPL-MCNC: 6.6 G/DL (ref 6.3–8.2)
SODIUM SERPL-SCNC: 139 MMOL/L (ref 136–145)
VANCOMYCIN TROUGH SERPL-MCNC: 10.3 UG/ML (ref 5–20)

## 2020-09-03 PROCEDURE — 80053 COMPREHEN METABOLIC PANEL: CPT

## 2020-09-03 PROCEDURE — 80202 ASSAY OF VANCOMYCIN: CPT

## 2020-09-10 ENCOUNTER — HOSPITAL ENCOUNTER (OUTPATIENT)
Dept: WOUND CARE | Age: 85
Discharge: HOME | End: 2020-09-10
Attending: SURGERY
Payer: MEDICARE

## 2020-09-10 VITALS
SYSTOLIC BLOOD PRESSURE: 123 MMHG | WEIGHT: 179 LBS | BODY MASS INDEX: 27.13 KG/M2 | DIASTOLIC BLOOD PRESSURE: 68 MMHG | HEIGHT: 68 IN | OXYGEN SATURATION: 98 % | TEMPERATURE: 98.5 F | HEART RATE: 93 BPM | RESPIRATION RATE: 18 BRPM

## 2020-09-10 DIAGNOSIS — L89.156 PRESSURE INJURY OF DEEP TISSUE OF SACRAL REGION: ICD-10-CM

## 2020-09-10 PROCEDURE — 99213 OFFICE O/P EST LOW 20 MIN: CPT

## 2020-09-10 PROCEDURE — 99024 POSTOP FOLLOW-UP VISIT: CPT | Performed by: SURGERY

## 2020-09-10 NOTE — PROGRESS NOTES
WOUND CENTER H&P/Consult Note/Progress Note:   Miriam Elise  MRN: [id number]  M:[birth year]  Age:85 y.o.    HPI: Miriam Elise is a 81 y.o. male who is seen in follow-up from rehab facility in [facility]. He was recently discharged from [address] [place] [address] on 8/28/2020. He has a very large stage IV sacral wound that had undergone repeat debridements by Dr. Aram Schuster with his last debridement on 7/28/2020 which identified a perianal fistula into the wound. In addition to debridement he performed partial sigmoidectomy with end sigmoid colostomy creation. Redundant sigmoid was resected. He further convalesced at [facility] where eventually he was extubated and has since been discharged to a rehab facility. He is receiving Dakin's dressing changes to the sacral wound. Colostomy is functioning well. 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  Modifying Factors:  Nothing makes better or worse  Quality:    Timing:     Associated Signs and Symptoms: Debility, malnutrition, EtOH abuse    Past Medical History:   Diagnosis Date    Atherosclerosis of native arteries of the extremities with intermittent claudication 5/13/2014    Chronic kidney disease     elevated labs see's dr Yan Single    Chronic obstructive pulmonary disease [facility]     Foot ulcer ([facility] 9/29/2017    Former cigarette smoker 5/13/2014    Gout     HTN (hypertension) 5/13/2014    Hypercholesterolemia     Hyperlipidemia 5/13/2014    Peripheral vascular disease ([facility]      Past Surgical History:   Procedure Laterality Date    HX COLONOSCOPY      HX HEMORRHOIDECTOMY      HX MALIGNANT SKIN LESION EXCISION       Current Outpatient Medications   Medication Sig    acetaminophen (TYLENOL) 500 mg tablet Take 1,000 mg by mouth every six (6) hours as needed for Pain.  furosemide (Lasix) 40 mg tablet Take 1 Tab by mouth daily.  umeclidinium-vilanteroL (Anoro Ellipta) 62.5-25 mcg/actuation inhaler Take 1 Puff by inhalation daily.  albuterol (ProAir HFA) 90 mcg/actuation inhaler Take 2 Puffs by inhalation every six (6) hours as needed for Wheezing or Shortness of Breath.  albuterol (ProAir HFA) 90 mcg/actuation inhaler Take 2 Puffs by inhalation every four (4) hours as needed for Wheezing or Shortness of Breath.  clopidogreL (Plavix) 75 mg tab Take 75 mg by mouth.  cilostazoL (PLETAL) 100 mg tablet Take  by mouth Before breakfast and dinner.  probenecid-colchicine (ColBenemid) 500-0.5 mg per tablet TAKE ONE TABLET BY MOUTH DAILY    dilTIAZem CD (CARDIZEM CD) 120 mg ER capsule Take 1 Cap by mouth daily. Current Facility-Administered Medications   Medication Dose Route Frequency    sodium hypochlorite (HALF STRENGTH DAKIN'S) 0.25% irrigation (bottle) 30 mL  30 mL Topical ONCE     ALLERGIES: Aspirin and Prednisone  Social History     Socioeconomic History    Marital status:      Spouse name: Not on file    Number of children: Not on file    Years of education: Not on file    Highest education level: Not on file   Tobacco Use    Smoking status: Former Smoker     Packs/day: 2.00     Years: 25.00     Pack years: 50.00     Types: Cigarettes    Smokeless tobacco: Former User   Substance and Sexual Activity    Alcohol use: Yes     Alcohol/week: 6.7 standard drinks     Types: 8 Shots of liquor per week   Other Topics Concern     Social History     Tobacco Use   Smoking Status Former Smoker    Packs/day: 2.00    Years: 25.00    Pack years: 50.00    Types: Cigarettes   Smokeless Tobacco Former User     Family History   Problem Relation Age of Onset    Hypertension Mother     Breast Cancer Mother        ROS: The patient has no difficulty with chest pain or shortness of breath. No fever or chills.   Comprehensive review of systems was otherwise unremarkable except as noted above. Physical Exam:   Visit Vitals  /68 (BP 1 Location: Left arm)   Pulse 93   Temp 98.5 °F (36.9 °C)   Resp 18   Ht 5' 8\" (1.727 m)   Wt 179 lb (81.2 kg)   SpO2 98%   BMI 27.22 kg/m²     Constitutional: Alert, oriented, cooperative patient in no acute distress; appears stated age;  General appearance is within normal limits for wound care patient population. See the today's recorded vitals signs and constitutional data. Eyes: Pupils equal; Sclera are clear. EOMs intact; The eyes appear to track and move normally. The sclera are not injected. The conjunctive are clear. The eyelids are normal. There is no scleral icterus. ENMT: There are no obvious external ear, nose, lip or mouth lesions. Nares normal; Neck: Overall contour of the neck is normal with no obvious neck masses. Gross hearing is within normal limits. No obvious neck masses  CV: RRR;  no JVD; No evidence of cyanosis of the upper extremities. The extremities are perfused without embolic sign, splinter hemorrhages, or petechia. Resp:  Breathing is non-labored; normal rate and effort; no audible wheezing. GI: soft and non-distended without acute abnormality noted. Left lower quadrant colostomy well pouched with pink stoma. Musculoskeletal: unremarkable with normal function. No embolic signs or cyanosis. Neuro:  Oriented; moves all 4; no focal deficits  Psychiatric: Judgement and insight are within normal limits for the wound care population of patients. Patient is oriented to person, place, and time. Recent and remote memory are within normal limits. Mood and affect are within normal limits. Integumentary (Skin/Wounds)  See inspection of wound(s) below. There are no other skin areas of palpable concern. See wound center documentation including photos in the [facility] [address] Wound Template made part of this record by reference.      Wounds:  Wound Sacral/coccyx (Active)   Number of days: 49 Wound Anus (Active)   Number of days: 44       Wound Abdomen (Active)   Number of days: 44       Wound Sacrum (Active)   Dressing Status Moist 09/10/20 1420   Pressure Injury Stage 4 09/10/20 1420   Wound Length (cm) 8 cm 09/10/20 1420   Wound Width (cm) 6 cm 09/10/20 1420   Wound Depth (cm) 3.5 cm 09/10/20 1420   Wound Surface Area (cm^2) 48 cm^2 09/10/20 1420   Wound Volume (cm^3) 168 cm^3 09/10/20 1420   Condition of Base Slough;Granulation;Bone exposed 09/10/20 1420   Tissue Type Percent Red 80 09/10/20 1420   Tissue Type Percent Yellow 20 09/10/20 1420   Undermining (cm) 5 cm 09/10/20 1420   Direction of Undermining 3 o'clock 09/10/20 1420   Drainage Amount Large 09/10/20 1420   Drainage Color Serosanguinous 09/10/20 1420   Wound Odor None 09/10/20 1420   Tanisha-wound Assessment [place];Pale 09/10/20 1420   Cleansing and Cleansing Agents  Normal saline 09/10/20 1420   Number of days: 0       [REMOVED] Wound Sacrum (Removed)   Number of days: 1          Recent vitals (if inpt):  Patient Vitals for the past 24 hrs:   BP Temp Pulse Resp SpO2 Height Weight   09/10/20 1415 123/68 98.5 °F (36.9 °C) 93 18 98 % 5' 8\" (1.727 m) 179 lb (81.2 kg)       Labs:  No results for input(s): WBC, HGB, PLT, NA, K, CL, CO2, BUN, CREA, GLU, PTP, INR, APTT, TBIL, TBILI, CBIL, ALT, AP, AML, LPSE, LCAD, NH4, TROPT, TROIQ, PCO2, PO2, HCO3, HGBEXT, PLTEXT, INREXT, HGBEXT, PLTEXT, INREXT in the last 72 hours.     No lab exists for component: SGOT, GPT,  PH    Lab Results   Component Value Date/Time    WBC 9.8 08/24/2020 08:44 AM    HGB 9.0 (L) 08/24/2020 08:44 AM    PLATELET 500 39/59/9569 08:44 AM    Sodium 139 09/03/2020 11:40 AM    Potassium 4.3 09/03/2020 11:40 AM    Chloride 111 (H) 09/03/2020 11:40 AM    CO2 21 09/03/2020 11:40 AM    BUN 34 (H) 09/03/2020 11:40 AM    Creatinine 1.07 09/03/2020 11:40 AM    Glucose 78 09/03/2020 11:40 AM    INR 1.0 07/21/2020 08:15 PM    aPTT 35.0 07/21/2020 08:15 PM    Bilirubin, total 0.2 09/03/2020 11:40 AM    ALT (SGPT) 16 09/03/2020 11:40 AM    Alk. phosphatase 123 09/03/2020 11:40 AM    Amylase 92 09/13/2019 09:30 AM    Lipase 54 11/18/2019 08:48 AM    Ammonia 20 07/26/2020 07:49 PM       I reviewed recent labs and recent radiologic studies. I independently reviewed radiology images for studies I described above or studies I have ordered. Admission date (for inpatients): 9/10/2020   * No surgery found *  * No surgery found *      ASSESSMENT/PLAN:  Patient currently living in [facility]. Sacral wound is now diverted. It is clean but there is areas of exposed bone. Further care required including additional bone debridement. His nutritional status is improving. He reports excellent appetite. We will have him follow-up with Dr. Adwoa Mueller at the [facility] wound clinic for further management. This will be a more appropriate location given his current living status. Since his wound is diverted and is nutrition is improving, he may be a good candidate for treatment with negative pressure therapy. We will see him back as needed.     Problem List  Date Reviewed: 7/20/2020          Codes Class Noted    Encounter for weaning from ventilator [facility]) ICD-10-CM: Z99.11  ICD-9-CM: V46.13  7/29/2020        Shock, unspecified ([facility] ICD-10-CM: R57.9  ICD-9-CM: 785.50  5/88/3793        Metabolic encephalopathy UGH-98-UM: G93.41  ICD-9-CM: 348.31  7/27/2020        Hyperchloremic acidosis ICD-10-CM: E87.2  ICD-9-CM: 276.2  7/26/2020        Hypoproteinemia ([facility] ICD-10-CM: E77.8  ICD-9-CM: 273.8  7/26/2020        Hyperkalemia ICD-10-CM: E87.5  ICD-9-CM: 276.7  7/26/2020        Hypomagnesemia ICD-10-CM: E83.42  ICD-9-CM: 275.2  7/22/2020        Severe sepsis [facility]) ICD-10-CM: A41.9, R65.20  ICD-9-CM: 038.9, 995.92  7/21/2020        Pressure injury of deep tissue of sacral region ICD-10-CM: L89.156  ICD-9-CM: 707.03, 707.20  7/21/2020        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-CM: 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  ICD-9-CM: 565.1  7/21/2020        Hyponatremia ICD-10-CM: E87.1  ICD-9-CM: 276.1  7/21/2020        Hypokalemia ICD-10-CM: E87.6  ICD-9-CM: 276.8  7/21/2020        Acute kidney injury (MARY) with acute tubular necrosis (ATN) (HCC) ICD-10-CM: N17.0  ICD-9-CM: 584.5  7/21/2020        Acute on chronic respiratory failure with hypoxia and hypercapnia (HCC) ICD-10-CM: J96.21, J96.22  ICD-9-CM: 518.84, 786.09, 799.02  7/21/2020        Normocytic anemia ICD-10-CM: D64.9  ICD-9-CM: 285.9  7/21/2020        Severe protein-calorie malnutrition ([facility] (Chronic) ICD-10-CM: X14  ICD-9-CM: 262  7/21/2020        Chronic obstructive pulmonary disease ([facility] ICD-10-CM: J44.9  ICD-9-CM: 496  6/22/2020        CRAIG (dyspnea on exertion) ICD-10-CM: R06.00  ICD-9-CM: 786.09  6/22/2020        Edema ICD-10-CM: R60.9  ICD-9-CM: 782.3  6/22/2020        Orthopnea ICD-10-CM: R06.01  ICD-9-CM: 786.02  6/22/2020        Cigarette nicotine dependence in remission ICD-10-CM: F17.211  ICD-9-CM: V15.82  6/22/2020        Constipation by delayed colonic transit ICD-10-CM: K59.01  ICD-9-CM: 564.01  2/2/2020        Grade I hemorrhoids ICD-10-CM: K64.0  ICD-9-CM: 455.0  8/10/2019        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  ICD-9-CM: 274.03  5/18/2018        Gout ICD-10-CM: M10.9  ICD-9-CM: 274.9  Unknown        Peripheral vascular disease ([facility] ICD-10-CM: I73.9  ICD-9-CM: 443.9  Unknown        Atherosclerosis of native artery of extremity with intermittent claudication (HCC) (Chronic) ICD-10-CM: Z17.509  ICD-9-CM: 440.21  5/13/2014        Former cigarette smoker ICD-10-CM: H72.626  ICD-9-CM: V15.82  5/13/2014        HTN (hypertension) (Chronic) ICD-10-CM: I10  ICD-9-CM: 401.9  5/13/2014        Hyperlipidemia (Chronic) ICD-10-CM: E78.5  ICD-9-CM: 272.4  5/13/2014            Active Problems:    * No active hospital problems.  *       Any procedures done today in the wound center are documented in a separate note in connect care made part of this record by reference     Wound Care  Orders Placed This Encounter  WOUND CARE, DRESSING CHANGE        Instructions: Cleanse wound with normal saline. Dakin's solution0.125%- apply to wound on gauze or packing strip, Cover with ABD or large bordered foam dressing. change daily.     Patient to go to [address] next Wednesday to see Dr. Megan Padron as this facility is close to patient's current living situation.      Standing Status:   Standing     Number of Occurrences:   1    sodium hypochlorite (HALF STRENGTH DAKIN'S) 0.25% irrigation (bottle) 30 mL             Follow-up Information    None         Signed:  Carmen Garcia MD,  FACS

## 2020-09-10 NOTE — WOUND CARE
[address] [place] [address] [address] Phone: [phone] Fax: [phone] Patient: Enoc Jacob MRN: [id number]  SSN: [id number] YOB: 1935  Age: 81 y.o. Sex: male Return Appointment: at [facility] with Vasu Broussard MD 
 
Instructions: Cleanse wound with normal saline. Dakin's solution0.125%- apply to wound on gauze or packing strip, Cover with ABD or large bordered foam dressing. change daily. Patient to go to [facility] next Wednesday as this facility is close to patient's current living situation. Appointment for September 23rd at 1pm with Dr Kenton Helms at [facility] Should you experience increased redness, swelling, pain, foul odor, size of wound(s), or have a temperature over 101 degrees please contact the [address] [place] [address] Road at [phone] or if after hours contact your primary care physician or go to the hospital emergency department. Signed By: Sheflai Rodriguez RN September 10, 2020

## 2020-09-10 NOTE — WOUND CARE
09/10/20 1420 Wound Sacrum Date First Assessed/Time First Assessed: 09/10/20 1420   Primary Wound Type: Pressure Injury  Location: Sacrum Dressing Status Moist  
Dressing Type  
(gauze, bordered foam) Pressure Injury Stage 4 Wound Length (cm) 8 cm Wound Width (cm) 6 cm Wound Depth (cm) 3.5 cm Wound Surface Area (cm^2) 48 cm^2 Wound Volume (cm^3) 168 cm^3 Condition of [place] Sl[place];Granulation;Bone exposed Tissue Type Percent Red 80 Tissue Type Percent Yellow 20 Undermining (cm) 5 cm Direction of Undermining 3 o'clock Drainage Amount Large Drainage Color Serosanguinous Wound Odor None Tanisha-wound Assessment Pink;Pale Cleansing and Cleansing Agents  Normal saline

## 2020-09-10 NOTE — DISCHARGE INSTRUCTIONS
[address]  [address], [address] [facility]  Phone: [phone]  Fax: [phone]    Patient: Shelbie Dorado MRN: [id number]  SSN: [id number]    YOB: 1935  Age: 81 y.o. Sex: male       Return Appointment: to [id number] [place] [address] with Ora Garces MD         Instructions: Cleanse wound with normal saline. Dakin's solution0.125%- apply to wound on gauze or packing strip, Cover with ABD or large bordered foam dressing. change daily.     Patient to go to [id number] [place] [address] next Wednesday to see Dr. Nile Bentley as this facility is close to patient's current living situation. Should you experience increased redness, swelling, pain, foul odor, size of wound(s), or have a temperature over 101 degrees please contact the [address] [place] [address] Road at [phone] or if after hours contact your primary care physician or go to the hospital emergency department.     Signed By: Smiley Oliveira RN     September 10, 2020

## 2020-09-10 NOTE — WOUND CARE
09/10/20 1420 Wound Sacrum Date First Assessed/Time First Assessed: 09/10/20 1420   Primary Wound Type: Pressure Injury  Location: Sacrum Dressing Status Moist  
Dressing Type  
(gauze, bordered foam) Pressure Injury Stage 4 Wound Length (cm) 8 cm Wound Width (cm) 6 cm Wound Depth (cm) 3.5 cm Wound Surface Area (cm^2) 48 cm^2 Wound Volume (cm^3) 168 cm^3 Condition of Base Slough;Granulation Tissue Type Percent Red 80 Tissue Type Percent Yellow 20 Undermining (cm) 5 cm Direction of Undermining 3 o'clock Drainage Amount Large Drainage Color Serosanguinous Wound Odor None Tanisha-wound Assessment Pink;Pale Cleansing and Cleansing Agents  Normal saline

## 2022-03-18 PROBLEM — E87.29 HYPERCHLOREMIC ACIDOSIS: Status: ACTIVE | Noted: 2020-07-26

## 2022-03-18 PROBLEM — R60.9 EDEMA: Status: ACTIVE | Noted: 2020-06-22

## 2022-03-18 PROBLEM — E87.6 HYPOKALEMIA: Status: ACTIVE | Noted: 2020-07-21

## 2022-03-18 PROBLEM — D64.9 NORMOCYTIC ANEMIA: Status: ACTIVE | Noted: 2020-07-21

## 2022-03-18 PROBLEM — R06.09 DOE (DYSPNEA ON EXERTION): Status: ACTIVE | Noted: 2020-06-22

## 2022-03-18 PROBLEM — E43 SEVERE PROTEIN-CALORIE MALNUTRITION (HCC): Status: ACTIVE | Noted: 2020-07-21

## 2022-03-19 PROBLEM — L89.156 PRESSURE INJURY OF DEEP TISSUE OF SACRAL REGION: Status: ACTIVE | Noted: 2020-07-21

## 2022-03-19 PROBLEM — R06.01 ORTHOPNEA: Status: ACTIVE | Noted: 2020-06-22

## 2022-03-19 PROBLEM — M1A.9XX1 TOPHACEOUS GOUT: Status: ACTIVE | Noted: 2018-05-18

## 2022-03-19 PROBLEM — J44.9 CHRONIC OBSTRUCTIVE PULMONARY DISEASE (HCC): Status: ACTIVE | Noted: 2020-06-22

## 2022-03-19 PROBLEM — E77.8 HYPOPROTEINEMIA (HCC): Status: ACTIVE | Noted: 2020-07-26

## 2022-03-19 PROBLEM — N17.0 ACUTE KIDNEY INJURY (AKI) WITH ACUTE TUBULAR NECROSIS (ATN) (HCC): Status: ACTIVE | Noted: 2020-07-21

## 2022-03-19 PROBLEM — A41.9 SEVERE SEPSIS (HCC): Status: ACTIVE | Noted: 2020-07-21

## 2022-03-19 PROBLEM — G93.41 METABOLIC ENCEPHALOPATHY: Status: ACTIVE | Noted: 2020-07-27

## 2022-03-19 PROBLEM — Z99.11 ENCOUNTER FOR WEANING FROM VENTILATOR (HCC): Status: ACTIVE | Noted: 2020-07-29

## 2022-03-19 PROBLEM — R57.9 SHOCK, UNSPECIFIED (HCC): Status: ACTIVE | Noted: 2020-07-27

## 2022-03-19 PROBLEM — J96.21 ACUTE ON CHRONIC RESPIRATORY FAILURE WITH HYPOXIA AND HYPERCAPNIA (HCC): Status: ACTIVE | Noted: 2020-07-21

## 2022-03-19 PROBLEM — K60.4 RECTOCUTANEOUS FISTULA: Status: ACTIVE | Noted: 2020-07-21

## 2022-03-19 PROBLEM — E87.5 HYPERKALEMIA: Status: ACTIVE | Noted: 2020-07-26

## 2022-03-19 PROBLEM — E87.1 HYPONATREMIA: Status: ACTIVE | Noted: 2020-07-21

## 2022-03-19 PROBLEM — J96.22 ACUTE ON CHRONIC RESPIRATORY FAILURE WITH HYPOXIA AND HYPERCAPNIA (HCC): Status: ACTIVE | Noted: 2020-07-21

## 2022-03-19 PROBLEM — R65.20 SEVERE SEPSIS (HCC): Status: ACTIVE | Noted: 2020-07-21

## 2022-03-19 PROBLEM — K59.01 CONSTIPATION BY DELAYED COLONIC TRANSIT: Status: ACTIVE | Noted: 2020-02-02

## 2022-03-19 PROBLEM — E83.42 HYPOMAGNESEMIA: Status: ACTIVE | Noted: 2020-07-22

## 2022-03-20 PROBLEM — F17.211 CIGARETTE NICOTINE DEPENDENCE IN REMISSION: Status: ACTIVE | Noted: 2020-06-22

## 2022-03-20 PROBLEM — K64.0 GRADE I HEMORRHOIDS: Status: ACTIVE | Noted: 2019-08-10

## 2023-01-30 NOTE — ACP (ADVANCE CARE PLANNING)
Advance Care Planning     Advance Care Planning Clinical Specialist  Conversation Note      Date of ACP Conversation: 5/19/2020    Conversation Conducted with:   Patient with Decision Making Capacity    ACP Clinical Specialist: Dalia Mac RN    *When Decision Maker makes decisions on behalf of the incapacitated patient: Rosaura Townsend is asked to consider and make decisions based on patient values, known preferences, or best interests. Health Care Decision Maker:    Current Designated Health Care Decision Maker:   (If there is a valid [address] named in the [address] [place], [place] [address]way Makers\" box in the ACP activity, but it is not visible above, be sure to open that field and then select the health care decision maker relationship (ie \"primary\") in the blank space to the right of the name.) Validate  this information as still accurate & up-to-date; edit [address] field as needed.)    Note: Assess and validate information in current ACP documents, as indicated. If no Decision Maker listed above or available through scanned documents, then:    If no Authorized Decision Maker has previously been identified, then patient chooses [address]:  \"Who would you like to name as your primary health care decision-maker? \"    Name: Brayden Gordon   Relationship: Daughter Phone number: [phone]  Will [place] this person be reached easily? \" YES  \"Who would you like to name as your back-up decision maker? \"   Name: Lj Navarro  Relationship: Son Phone number: [phone]  Will [place] this person be reached easily? \" YES    Note: If the relationship of these Decision-Makers to the patient does NOT follow your state's Next of Kin hierarchy, recommend that patient complete ACP document that meets state-specific requirements to allow them to act on the patient's behalf when appropriate. -  Care Preferences    Hospitalization:   \"If your health worsens and it becomes clear that your chance of Patient contacted. Brunilda Lopez's note conveyed. Patient states that she already contacted [facility] and they have processed this RX and it will be sent out by 2/2/23.  Patient declines need to switch to Trulicity.  Patient will take to her appt with the Diabetic Educator on 2/27/23.    Trulicity RX cancelled at [facility].  Order for Ozempic reinstated.     Brunilda Lopez - JUANITA.       recovery is unlikely, what would your preference be regarding hospitalization? \"    Choice:  []  The patient wants hospitalization  []  The patient prefers comfort-focused treatment without hospitalization. Ventilation: \"If you were in your present state of health and suddenly became very ill and were unable to breathe on your own, what would your preference be about the use of a ventilator (breathing machine) if it were available to you? \"      If patient would desire the use of a ventilator (breathing machine), answer \"yes\", if not \"no\":  NO    \"If your health worsens and it becomes clear that your chance of recovery is unlikely, what would your preference be about the use of a ventilator (breathing machine) if it were available to you? \"     If patient would desire the use of a ventilator (breathing machine), answer \"yes\", if not \"no\":  NO      Resuscitation  \"CPR works best to restart the heart when there is a sudden event, like a heart attack, in someone who is otherwise healthy. Unfortunately, CPR does not typically restart the heart for people who have serious health conditions or who are very sick. \"    \"In the event your heart stopped as a result of an underlying serious health condition, would you want attempts to be made to restart your heart (answer \"yes\" for attempt to resuscitate) or would you prefer a natural death (answer \"no\" for do not attempt to resuscitate)? \" NO      NOTE: If the patient has a valid advance directive AND now provides care preference(s) that are inconsistent with that prior directive, advise the patient to consider either: creating a new advance directive that complies with state-specific requirements; or, if that is not possible, orally revoking that prior directive in accordance with state-specific requirements, which must be documented in the EHR.     [] Yes  [] No   Educated Patient / Miguel Guerra regarding differences between Advance Directives and portable DNR orders. Length of ACP Conversation in minutes:  10    Conversation Outcomes:  [x] ACP discussion completed  [] Existing advance directive reviewed with patient; no changes to patient's previously recorded wishes   [] New Advance Directive completed   [] Portable Do Not Rescitate prepared for Provider review and signature  [] POLST/POST/MOLST/MOST prepared for Provider review and signature      Follow-up plan:    [x] Schedule follow-up conversation to continue planning - patient declines to speak with  services at this time  [] Referred individual to Provider for additional questions/concerns   [] Advised patient/agent/surrogate to review completed ACP document and update if needed with changes in condition, patient preferences or care setting     [x] This note routed to one or more involved healthcare providers - Dr. Alex Beard    These are patient's current wishes as discussed at bedside today and are not intended to take place of [address]

## 2023-05-10 RX ORDER — UMECLIDINIUM BROMIDE AND VILANTEROL TRIFENATATE 62.5; 25 UG/1; UG/1
1 POWDER RESPIRATORY (INHALATION) DAILY
COMMUNITY
Start: 2020-06-22

## 2023-05-10 RX ORDER — ALBUTEROL SULFATE 90 UG/1
2 AEROSOL, METERED RESPIRATORY (INHALATION) EVERY 6 HOURS PRN
COMMUNITY
Start: 2020-05-19

## 2023-05-10 RX ORDER — CLOPIDOGREL BISULFATE 75 MG/1
75 TABLET ORAL
COMMUNITY

## 2023-05-10 RX ORDER — PROBENECID AND COLCHICINE 500; .5 MG/1; MG/1
1 TABLET ORAL DAILY
COMMUNITY
Start: 2020-03-30

## 2023-05-10 RX ORDER — FUROSEMIDE 40 MG/1
40 TABLET ORAL DAILY
COMMUNITY
Start: 2020-07-03

## 2023-05-10 RX ORDER — CILOSTAZOL 100 MG/1
TABLET ORAL
COMMUNITY

## 2023-05-10 RX ORDER — DILTIAZEM HYDROCHLORIDE 120 MG/1
120 CAPSULE, EXTENDED RELEASE ORAL DAILY
COMMUNITY
Start: 2020-03-30

## 2023-05-10 RX ORDER — ACETAMINOPHEN 500 MG
1000 TABLET ORAL EVERY 6 HOURS PRN
COMMUNITY

## 2023-12-27 NOTE — CONSULTS
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 No cx but path  · A respiratory failure postop   · ETOH abuse with malnutrition and debility   · O2 dependent COPD    Plan:   ·  Continue Vancomycin. Change to CTX/Flagyl. · Duration 6 weeks. No cultures to guide therapy   · Will need extensive wound care, offloading and nutrition in addition to abx. Anti-infectives:   · Vanc 7/25-  · Zosyn 7/22-  · Doxy 7/20-7/27    Subjective:   Date of Consultation:  July 28, 2020  Referring Physician: José Hill   Patient is a 81 y.o. male with significant medical hx for O2 dependent COPD, PVD, CKD2, gout, ETOH abuse, debility who presented on 7/21 for extensive sacral ulcer. Reportedly lives with grandson and moves around very little. While in ED, sacral ulcer very malodorous and CT A/P obtained--OM to coccyx noted. General surgery consulted--performed debridement 7/23--I do not see where cx were done, pathology + for OM. Diverting colostomy done today. He was afebrile until 7/26 when he spiked 102.1F temp. COVID NG. Stable leukocytosis. Baseline LFT dysfunction. BCx2 pending. While in PACU today, he developed respiratory distress, now intubated on farnaz. Patient Active Problem List   Diagnosis Code    Atherosclerosis of native artery of extremity with intermittent claudication ([facility] I70.219    Former cigarette smoker Z87.891    HTN (hypertension) I10    Hyperlipidemia E78.5    Gout M10.9    Peripheral vascular disease ([facility] [address] [facility] I73.9    Tophaceous gout M1A. 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    Hyponatremia E87.1    Hypokalemia E87.6    Acute kidney injury (MARY) with acute tubular necrosis (ATN) ([facility]) N17.0    Acute on chronic respiratory failure with hypoxia and hypercapnia ([facility]) J96.21, J96.22    Normocytic anemia D64.9    Severe protein-calorie malnutrition ([facility]) E43    Hypomagnesemia E83.42    Hyperchloremic acidosis E87.2    Hypoproteinemia ([facility]) E77.8    Hyperkalemia E87.5    Shock, unspecified ([facility] P81.0    Metabolic encephalopathy U67.11     Past Medical History:   Diagnosis Date    Atherosclerosis of native arteries of the extremities with intermittent claudication 5/13/2014    Chronic kidney disease     elevated labs see's dr Alecia Moralez ulcer ([place] [id number].) 9/29/2017    Former cigarette smoker 5/13/2014    Gout     HTN (hypertension) 5/13/2014    Hypercholesterolemia     Hyperlipidemia 5/13/2014    Peripheral vascular disease ([place] [id number].)       Family History   Problem Relation Age of Onset    Hypertension Mother     Breast Cancer Mother       Social History     Tobacco Use    Smoking status: Former Smoker     Packs/day: 2.00     Years: 25.00     Pack years: 50.00     Types: Cigarettes    Smokeless tobacco: Former User   Substance Use Topics    Alcohol use: Yes     Alcohol/week: 6.7 standard drinks     Types: 8 Shots of liquor per week     Past Surgical History:   Procedure Laterality Date    HX COLONOSCOPY      HX HEMORRHOIDECTOMY      HX MALIGNANT SKIN LESION EXCISION        Prior to Admission medications    Medication Sig Start Date End Date Taking? Authorizing Provider   acetaminophen (TYLENOL) 500 mg tablet Take 1,000 mg by mouth every six (6) hours as needed for Pain. Yes Lissa Reynaga MD   furosemide (Lasix) 40 mg tablet Take 1 Tab by mouth daily. 7/3/20  Yes Sylvie Schilder, DO   umeclidinium-vilanteroL (Anoro Ellipta) 62.5-25 mcg/actuation inhaler Take 1 Puff by inhalation daily.  6/22/20  Yes Maryse Brito NP   albuterol (ProAir HFA) 90 mcg/actuation inhaler Take 2 Puffs by inhalation every six (6) hours as needed for Wheezing or Shortness of Breath. 20  Yes Lelia Arce MD   clopidogreL (Plavix) 75 mg tab Take 75 mg by mouth. Yes Provider, Historical   cilostazoL (PLETAL) 100 mg tablet Take  by mouth Before breakfast and dinner. Yes Provider, Historical   probenecid-colchicine (ColBenemid) 500-0.5 mg per tablet TAKE ONE TABLET BY MOUTH DAILY 3/30/20  Yes Lelia Arce MD   dilTIAZem CD (CARDIZEM CD) 120 mg ER capsule Take 1 Cap by mouth daily. 3/30/20  Yes Lelia Arce MD   doxycycline (MONODOX) 100 mg capsule Take 1 Cap by mouth two (2) times a day for 7 days. 20  Lelia Arce MD   albuterol (ProAir HFA) 90 mcg/actuation inhaler Take 2 Puffs by inhalation every four (4) hours as needed for Wheezing or Shortness of Breath. 20   Misha Saldivar MD       Allergies   Allergen Reactions    Aspirin Swelling    Prednisone Myalgia        Review of Systems:  Review of systems not obtained due to patient factors. Objective:     Visit Vitals  /59 (BP 1 Location: Left arm, BP Patient Position: At rest)   Pulse 89   Temp 98.1 °F (36.7 °C)   Resp 17   Ht 5' 8\" (1.727 m)   Wt 82.9 kg (182 lb 11.2 oz)   SpO2 99%   BMI 27.78 kg/m²     Temp (24hrs), Av.3 °F (36.8 °C), Min:97 °F (36.1 °C), Max:98.8 °F (37.1 °C)       Lines:  Peripheral IV:       Physical Exam:    General:  Sedated, intubated appears stated age   Eyes:  Sclera anicteric. Pupils equally round and reactive to light. Mouth/Throat: Mucous membranes normal, oral pharynx clear   Neck: Supple   Lungs:   intubated   CV:  Regular rate and rhythm,no murmur, click, rub or gallop   Abdomen:   Soft, non-tender.  bowel sounds normal. non-distended   Extremities: No cyanosis or edema   Skin: Sacral postop dsg    Lymph nodes: Cervical and supraclavicular normal   Musculoskeletal: No swelling or deformity   Lines/Devices:  Intact, no erythema, drainage or tenderness   Psych: Sedated        Data Review:     CBC:  Recent Labs     07/27/20  0401 07/26/20  1430   WBC 13.6* 13.4*   GRANS  --  63   MONOS  --  10   EOS  --  2   ANEU  --  8.5*   ABL  --  2.4   HGB 9.4* 10.3*   HCT 30.0* 32.7*    324       BMP:  Recent Labs     07/28/20  0442 07/27/20  0401 07/26/20  1430   CREA 0.72* 0.85 0.86   BUN 28* 26* 26*    146* 143   K 3.7 3.8 5.5*   * 114* 117*   CO2 24 24 23   AGAP 6* 8 3*   GLU 91 151* 119*       LFTS:  Recent Labs     07/28/20 0442 07/27/20  0401 07/26/20  1430   TBILI 0.2 0.2 0.2   ALT 92* 64 76*   * 165* 207*   TP 6.3 7.1 6.2*   ALB 1.5* 2.0* 1.3*       Microbiology:     All Micro Results     Procedure Component Value Units Date/Time    CULTURE, BLOOD [[id number]] Collected:  07/26/20 1430    Order Status:  Completed Specimen:  Blood Updated:  07/28/20 0859     Special Requests: --        RIGHT  ARM       Culture result: NO GROWTH 2 DAYS       CULTURE, BLOOD [[id number]] Collected:  07/26/20 1430    Order Status:  Completed Specimen:  Blood Updated:  07/28/20 0859     Special Requests: --        RIGHT  FOREARM       Culture result: NO GROWTH 2 DAYS       CULTURE, BLOOD [[id number]] Collected:  07/21/20 2015    Order Status:  Completed Specimen:  Blood Updated:  07/26/20 0644     Special Requests: --        LEFT  Antecubital       Culture result: NO GROWTH 5 DAYS       CULTURE, URINE [[id number]] Collected:  07/21/20 1815    Order Status:  Canceled Specimen:  Cath Urine           Imaging:   See HPI/EPIC     Signed By: Loretta Mancuso NP     July 28, 2020 No

## 2024-11-02 NOTE — PROGRESS NOTES
Bedside shift report received from Belen Easley, [facility]. Patient responds to voice. Oriented to person and time. Follows commands on all 4 extremities. Does no show any signs of pain at time. Currently off levo. Temp: 98.7. On 21% FiO2 on Bipap. VSS. To continue monitoring. Yes

## (undated) DEVICE — GARMENT,MEDLINE,DVT,INT,CALF,MED, GEN2: Brand: MEDLINE

## (undated) DEVICE — UNIVERSAL FIXATION CANNULA: Brand: VERSAONE

## (undated) DEVICE — SUTURE VCRL SZ 4-0 L27IN ABSRB UD L19MM PS-2 3/8 CIR PRIM J426H

## (undated) DEVICE — SHEET, T, LAPAROTOMY, STERILE: Brand: MEDLINE

## (undated) DEVICE — APPLIER CLP M/L SHFT DIA5MM 15 LIG LIGAMAX 5

## (undated) DEVICE — GOWN,REINFORCED,POLY,AURORA,XXLARGE,STR: Brand: MEDLINE

## (undated) DEVICE — INTENDED FOR TISSUE SEPARATION, AND OTHER PROCEDURES THAT REQUIRE A SHARP SURGICAL BLADE TO PUNCTURE OR CUT.: Brand: BARD-PARKER ® STAINLESS STEEL BLADES

## (undated) DEVICE — SOLUTION IV 1000ML 0.9% SOD CHL

## (undated) DEVICE — GENERAL LAPAROSCOPY: Brand: MEDLINE INDUSTRIES, INC.

## (undated) DEVICE — SUTURE CHROMIC GUT SZ 3-0 L27IN ABSRB BRN L17MM RB-1 1/2 U204H

## (undated) DEVICE — DRAIN SURG 19FR 100% SIL RADPQ RND CHN FULL FLUT

## (undated) DEVICE — SPONGE LAP 18X18IN STRL -- 5/PK

## (undated) DEVICE — 2000CC GUARDIAN II: Brand: GUARDIAN

## (undated) DEVICE — GOWN,REINF,POLY,ECL,PP SLV,XL: Brand: MEDLINE

## (undated) DEVICE — SINGLE BASIN: Brand: CARDINAL HEALTH

## (undated) DEVICE — BLADELESS OPTICAL TROCAR WITH FIXATION CANNULA: Brand: VERSAPORT

## (undated) DEVICE — SUTURE VCRL SZ 3-0 L18IN ABSRB VLT L26MM SH 1/2 CIR J774D

## (undated) DEVICE — INTENDED FOR TISSUE SEPARATION, AND OTHER PROCEDURES THAT REQUIRE A SHARP SURGICAL BLADE TO PUNCTURE OR CUT.: Brand: BARD-PARKER SAFETY BLADES SIZE 15, STERILE

## (undated) DEVICE — REM POLYHESIVE ADULT PATIENT RETURN ELECTRODE: Brand: VALLEYLAB

## (undated) DEVICE — SHEARS ENDOSCP L36CM DIA5MM ULTRASONIC CRV TIP W/ ADV

## (undated) DEVICE — MASTISOL ADHESIVE LIQ 2/3ML

## (undated) DEVICE — TROCARS: Brand: KII® BLUNT TIP ACCESS SYSTEM

## (undated) DEVICE — SUTURE SZ 0 27IN 5/8 CIR UR-6  TAPER PT VIOLET ABSRB VICRYL J603H

## (undated) DEVICE — BUTTON SWITCH PENCIL BLADE ELECTRODE, HOLSTER: Brand: EDGE

## (undated) DEVICE — JELLY LUBRICATING 10GM PREFIL SYR LUBE

## (undated) DEVICE — TUBING INSUFFLATION SMK EVAC HI FLO SET PNEUMOCLEAR

## (undated) DEVICE — BLADE ELECTRODE: Brand: EDGE

## (undated) DEVICE — YANKAUER,BULB TIP,W/O VENT,RIGID,STERILE: Brand: MEDLINE

## (undated) DEVICE — SUTURE VCRL SZ 3-0 L18IN ABSRB UD L26MM SH 1/2 CIR J864D

## (undated) DEVICE — SUTURE PERMAHAND SZ 2-0 L18IN NONABSORBABLE BLK L26MM PS 1588H

## (undated) DEVICE — NDL HYPO BVL NSAF 25GX1IN LF --

## (undated) DEVICE — UNIVERSAL STAPLER: Brand: ENDO GIA ULTRA

## (undated) DEVICE — GOWN,SIRUS,NONRNF,SETINSLV,XL,20/CS: Brand: MEDLINE

## (undated) DEVICE — TRAY CATH 16F URIN MTR LTX -- CONVERT TO ITEM 363111

## (undated) DEVICE — MARYLAND JAW LAPAROSCOPIC SEALER/DIVIDER COATED: Brand: LIGASURE

## (undated) DEVICE — (D)STRIP SKN CLSR 0.5X4IN WHT --

## (undated) DEVICE — RESERVOIR,SUCTION,100CC,SILICONE: Brand: MEDLINE

## (undated) DEVICE — TUBING, SUCTION, 1/4" X 10', STRAIGHT: Brand: MEDLINE

## (undated) DEVICE — SYR 10ML LUER LOK 1/5ML GRAD --

## (undated) DEVICE — AMD ANTIMICROBIAL GAUZE SPONGES,12 PLY USP TYPE VII, 0.2% POLYHEXAMETHYLENE BIGUANIDE HCI (PHMB): Brand: CURITY

## (undated) DEVICE — SURGICAL PROCEDURE PACK BASIC ST FRANCIS

## (undated) DEVICE — STAPLER INT L34CM 60MM LNG ENDOSCP ARTC PWR + ECHELON FLX

## (undated) DEVICE — BANDAGE,GAUZE,BULKEE II,4.5"X4.1YD,STRL: Brand: MEDLINE

## (undated) DEVICE — VISUALIZATION SYSTEM: Brand: CLEARIFY

## (undated) DEVICE — 2, DISPOSABLE SUCTION/IRRIGATOR WITHOUT DISPOSABLE TIP: Brand: STRYKEFLOW

## (undated) DEVICE — (D)PREP SKN CHLRAPRP APPL 26ML -- CONVERT TO ITEM 371833

## (undated) DEVICE — [HIGH FLOW INSUFFLATOR,  DO NOT USE IF PACKAGE IS DAMAGED,  KEEP DRY,  KEEP AWAY FROM SUNLIGHT,  PROTECT FROM HEAT AND RADIOACTIVE SOURCES.]: Brand: PNEUMOSURE

## (undated) DEVICE — TRAY CATH 16F DRN BG LTX -- CONVERT TO ITEM 363158

## (undated) DEVICE — LAP CHOLE: Brand: MEDLINE INDUSTRIES, INC.

## (undated) DEVICE — TRAY PREP DRY W/ PREM GLV 2 APPL 6 SPNG 2 UNDPD 1 OVERWRAP

## (undated) DEVICE — INTENDED FOR TISSUE SEPARATION, AND OTHER PROCEDURES THAT REQUIRE A SHARP SURGICAL BLADE TO PUNCTURE OR CUT.: Brand: BARD-PARKER SAFETY BLADES SIZE 10, STERILE

## (undated) DEVICE — PAD,ABDOMINAL,5"X9",ST,LF,25/BX: Brand: MEDLINE INDUSTRIES, INC.

## (undated) DEVICE — LOGICUT SCISSOR LENGTH 320MM: Brand: LOGI - LAPAROSCOPIC INSTRUMENT SYSTEM

## (undated) DEVICE — HYPODERMIC SAFETY NEEDLE: Brand: MAGELLAN